# Patient Record
Sex: FEMALE | Race: WHITE | Employment: PART TIME | URBAN - NONMETROPOLITAN AREA
[De-identification: names, ages, dates, MRNs, and addresses within clinical notes are randomized per-mention and may not be internally consistent; named-entity substitution may affect disease eponyms.]

---

## 2017-01-17 ENCOUNTER — HOSPITAL ENCOUNTER (OUTPATIENT)
Dept: LAB | Age: 32
Discharge: HOME | End: 2017-01-17
Attending: SPECIALIST | Admitting: SPECIALIST

## 2017-01-17 LAB — CALCIUM SERPL-MCNC: 9.6 MG/DL (ref 8.3–10.6)

## 2017-01-18 LAB — PARATHYROID HORMONE INTACT: 39

## 2017-05-11 ENCOUNTER — HOSPITAL ENCOUNTER (OUTPATIENT)
Dept: GENERAL RADIOLOGY | Age: 32
Discharge: HOME | End: 2017-05-11
Attending: UROLOGY | Admitting: UROLOGY

## 2017-05-11 DIAGNOSIS — N20.0 URIC ACID NEPHROLITHIASIS: ICD-10-CM

## 2017-05-12 ENCOUNTER — HOSPITAL ENCOUNTER (OUTPATIENT)
Dept: OTHER | Age: 32
Discharge: HOME | End: 2017-05-12
Attending: SPECIALIST | Admitting: SPECIALIST

## 2017-05-17 LAB
STONE COMPOSITION: NORMAL
STONE DESCRIPTION: NORMAL
STONE MASS: 15
STONE NUMBER: 1
STONE SIZE: NORMAL

## 2017-11-01 ENCOUNTER — HOSPITAL ENCOUNTER (OUTPATIENT)
Dept: ULTRASOUND IMAGING | Age: 32
Discharge: HOME | End: 2017-11-01
Attending: SPECIALIST | Admitting: SPECIALIST

## 2017-11-01 DIAGNOSIS — N20.0 CALCULUS OF KIDNEY: ICD-10-CM

## 2018-04-29 PROBLEM — Z32.01 PREGNANCY EXAMINATION OR TEST, POSITIVE RESULT: Status: ACTIVE | Noted: 2018-04-29

## 2018-07-17 PROBLEM — O26.90 PREGNANCY RELATED CONDITION: Status: ACTIVE | Noted: 2018-07-17

## 2018-07-26 PROBLEM — Z34.93 ENCOUNTER FOR PREGNANCY RELATED EXAMINATION, THIRD TRIMESTER: Status: ACTIVE | Noted: 2018-07-26

## 2019-11-07 ENCOUNTER — HOSPITAL ENCOUNTER (EMERGENCY)
Age: 34
Discharge: HOME | End: 2019-11-07
Attending: EMERGENCY MEDICINE
Payer: COMMERCIAL

## 2019-11-07 VITALS
WEIGHT: 194 LBS | BODY MASS INDEX: 28.73 KG/M2 | HEIGHT: 69 IN | OXYGEN SATURATION: 99 % | DIASTOLIC BLOOD PRESSURE: 78 MMHG | RESPIRATION RATE: 16 BRPM | TEMPERATURE: 98.2 F | SYSTOLIC BLOOD PRESSURE: 121 MMHG | HEART RATE: 75 BPM

## 2019-11-07 DIAGNOSIS — R10.13 ABDOMINAL PAIN, EPIGASTRIC: Primary | ICD-10-CM

## 2019-11-07 LAB
ALBUMIN SERPL-MCNC: 4.4 GM/DL (ref 3.4–5)
ALP BLD-CCNC: 85 IU/L (ref 40–129)
ALT SERPL-CCNC: 10 U/L (ref 10–40)
ANION GAP SERPL CALCULATED.3IONS-SCNC: 8 MMOL/L (ref 4–16)
AST SERPL-CCNC: 8 IU/L (ref 15–37)
BASOPHILS ABSOLUTE: 0 K/CU MM
BASOPHILS RELATIVE PERCENT: 0.6 % (ref 0–1)
BILIRUB SERPL-MCNC: 0.2 MG/DL (ref 0–1)
BUN BLDV-MCNC: 11 MG/DL (ref 6–23)
CALCIUM SERPL-MCNC: 9.7 MG/DL (ref 8.3–10.6)
CHLORIDE BLD-SCNC: 98 MMOL/L (ref 99–110)
CO2: 31 MMOL/L (ref 21–32)
CREAT SERPL-MCNC: 0.8 MG/DL (ref 0.6–1.1)
DIFFERENTIAL TYPE: ABNORMAL
EOSINOPHILS ABSOLUTE: 0.1 K/CU MM
EOSINOPHILS RELATIVE PERCENT: 1.8 % (ref 0–3)
GFR AFRICAN AMERICAN: >60 ML/MIN/1.73M2
GFR NON-AFRICAN AMERICAN: >60 ML/MIN/1.73M2
GLUCOSE BLD-MCNC: 94 MG/DL (ref 70–99)
HCG QUALITATIVE: NEGATIVE
HCT VFR BLD CALC: 45.4 % (ref 37–47)
HEMOGLOBIN: 14.5 GM/DL (ref 12.5–16)
IMMATURE NEUTROPHIL %: 0.2 % (ref 0–0.43)
LIPASE: 27 IU/L (ref 13–60)
LYMPHOCYTES ABSOLUTE: 2 K/CU MM
LYMPHOCYTES RELATIVE PERCENT: 36.7 % (ref 24–44)
MCH RBC QN AUTO: 26.6 PG (ref 27–31)
MCHC RBC AUTO-ENTMCNC: 31.9 % (ref 32–36)
MCV RBC AUTO: 83.2 FL (ref 78–100)
MONOCYTES ABSOLUTE: 0.5 K/CU MM
MONOCYTES RELATIVE PERCENT: 8.4 % (ref 0–4)
PDW BLD-RTO: 14.4 % (ref 11.7–14.9)
PLATELET # BLD: 224 K/CU MM (ref 140–440)
PMV BLD AUTO: 9.9 FL (ref 7.5–11.1)
POTASSIUM SERPL-SCNC: 4.1 MMOL/L (ref 3.5–5.1)
RBC # BLD: 5.46 M/CU MM (ref 4.2–5.4)
SEGMENTED NEUTROPHILS ABSOLUTE COUNT: 2.9 K/CU MM
SEGMENTED NEUTROPHILS RELATIVE PERCENT: 52.3 % (ref 36–66)
SODIUM BLD-SCNC: 137 MMOL/L (ref 135–145)
TOTAL IMMATURE NEUTOROPHIL: 0.01 K/CU MM
TOTAL PROTEIN: 7.9 GM/DL (ref 6.4–8.2)
WBC # BLD: 5.5 K/CU MM (ref 4–10.5)

## 2019-11-07 PROCEDURE — 6370000000 HC RX 637 (ALT 250 FOR IP): Performed by: EMERGENCY MEDICINE

## 2019-11-07 PROCEDURE — 80053 COMPREHEN METABOLIC PANEL: CPT

## 2019-11-07 PROCEDURE — 84703 CHORIONIC GONADOTROPIN ASSAY: CPT

## 2019-11-07 PROCEDURE — 85025 COMPLETE CBC W/AUTO DIFF WBC: CPT

## 2019-11-07 PROCEDURE — 83690 ASSAY OF LIPASE: CPT

## 2019-11-07 PROCEDURE — 99284 EMERGENCY DEPT VISIT MOD MDM: CPT

## 2019-11-07 RX ORDER — LIDOCAINE HYDROCHLORIDE 20 MG/ML
15 SOLUTION OROPHARYNGEAL ONCE
Status: COMPLETED | OUTPATIENT
Start: 2019-11-07 | End: 2019-11-07

## 2019-11-07 RX ORDER — MAGNESIUM HYDROXIDE/ALUMINUM HYDROXICE/SIMETHICONE 120; 1200; 1200 MG/30ML; MG/30ML; MG/30ML
30 SUSPENSION ORAL ONCE
Status: COMPLETED | OUTPATIENT
Start: 2019-11-07 | End: 2019-11-07

## 2019-11-07 RX ORDER — SUCRALFATE ORAL 1 G/10ML
1 SUSPENSION ORAL 4 TIMES DAILY
Qty: 1200 ML | Refills: 0 | Status: SHIPPED | OUTPATIENT
Start: 2019-11-07 | End: 2021-09-30 | Stop reason: ALTCHOICE

## 2019-11-07 RX ADMIN — LIDOCAINE HYDROCHLORIDE 15 ML: 20 SOLUTION ORAL; TOPICAL at 13:39

## 2019-11-07 RX ADMIN — ALUMINUM HYDROXIDE, MAGNESIUM HYDROXIDE, AND SIMETHICONE 30 ML: 200; 200; 20 SUSPENSION ORAL at 13:39

## 2019-11-07 ASSESSMENT — PAIN SCALES - GENERAL: PAINLEVEL_OUTOF10: 5

## 2019-11-07 ASSESSMENT — ENCOUNTER SYMPTOMS
CONSTIPATION: 0
EYE REDNESS: 0
BLOOD IN STOOL: 0
RHINORRHEA: 0
COUGH: 0
SORE THROAT: 0
ABDOMINAL PAIN: 1
BACK PAIN: 0
VOMITING: 0
SHORTNESS OF BREATH: 0
NAUSEA: 1
DIARRHEA: 1

## 2019-11-07 ASSESSMENT — PAIN DESCRIPTION - PAIN TYPE: TYPE: ACUTE PAIN

## 2019-11-07 ASSESSMENT — PAIN DESCRIPTION - ORIENTATION: ORIENTATION: UPPER

## 2019-11-07 ASSESSMENT — PAIN DESCRIPTION - DESCRIPTORS: DESCRIPTORS: ACHING

## 2019-11-07 ASSESSMENT — PAIN DESCRIPTION - LOCATION: LOCATION: ABDOMEN

## 2019-11-07 ASSESSMENT — PAIN DESCRIPTION - FREQUENCY: FREQUENCY: CONTINUOUS

## 2021-08-17 ENCOUNTER — TELEPHONE (OUTPATIENT)
Dept: OBGYN | Age: 36
End: 2021-08-17

## 2021-08-18 DIAGNOSIS — O20.0 THREATENED ABORTION: Primary | ICD-10-CM

## 2021-08-18 LAB — GONADOTROPIN, CHORIONIC (HCG) QUANT: 22.4 MIU/ML

## 2021-08-18 PROCEDURE — 36415 COLL VENOUS BLD VENIPUNCTURE: CPT | Performed by: OBSTETRICS & GYNECOLOGY

## 2021-08-26 ENCOUNTER — OFFICE VISIT (OUTPATIENT)
Dept: OBGYN | Age: 36
End: 2021-08-26

## 2021-08-26 VITALS
SYSTOLIC BLOOD PRESSURE: 144 MMHG | DIASTOLIC BLOOD PRESSURE: 89 MMHG | WEIGHT: 192 LBS | BODY MASS INDEX: 28.44 KG/M2 | HEIGHT: 69 IN

## 2021-08-26 DIAGNOSIS — O02.81 CHEMICAL PREGNANCY: Primary | ICD-10-CM

## 2021-08-26 LAB — GONADOTROPIN, CHORIONIC (HCG) QUANT: <5 MIU/ML

## 2021-08-26 PROCEDURE — 36415 COLL VENOUS BLD VENIPUNCTURE: CPT | Performed by: OBSTETRICS & GYNECOLOGY

## 2021-08-26 PROCEDURE — 99213 OFFICE O/P EST LOW 20 MIN: CPT | Performed by: OBSTETRICS & GYNECOLOGY

## 2021-08-26 SDOH — ECONOMIC STABILITY: FOOD INSECURITY: WITHIN THE PAST 12 MONTHS, THE FOOD YOU BOUGHT JUST DIDN'T LAST AND YOU DIDN'T HAVE MONEY TO GET MORE.: NEVER TRUE

## 2021-08-26 SDOH — ECONOMIC STABILITY: FOOD INSECURITY: WITHIN THE PAST 12 MONTHS, YOU WORRIED THAT YOUR FOOD WOULD RUN OUT BEFORE YOU GOT MONEY TO BUY MORE.: NEVER TRUE

## 2021-08-26 ASSESSMENT — PATIENT HEALTH QUESTIONNAIRE - PHQ9
SUM OF ALL RESPONSES TO PHQ QUESTIONS 1-9: 0
SUM OF ALL RESPONSES TO PHQ9 QUESTIONS 1 & 2: 0
SUM OF ALL RESPONSES TO PHQ QUESTIONS 1-9: 0
1. LITTLE INTEREST OR PLEASURE IN DOING THINGS: 0
SUM OF ALL RESPONSES TO PHQ QUESTIONS 1-9: 0
2. FEELING DOWN, DEPRESSED OR HOPELESS: 0

## 2021-08-26 ASSESSMENT — ENCOUNTER SYMPTOMS
GASTROINTESTINAL NEGATIVE: 1
RESPIRATORY NEGATIVE: 1
ALLERGIC/IMMUNOLOGIC NEGATIVE: 1
EYES NEGATIVE: 1

## 2021-08-26 ASSESSMENT — SOCIAL DETERMINANTS OF HEALTH (SDOH): HOW HARD IS IT FOR YOU TO PAY FOR THE VERY BASICS LIKE FOOD, HOUSING, MEDICAL CARE, AND HEATING?: NOT HARD AT ALL

## 2021-08-26 NOTE — PROGRESS NOTES
8/26/21    Reid Gonzalez  1985    Chief Complaint   Patient presents with    Follow-up     pt here to discuss labs and ultrasound, Pt states she is not bleeding anymore. Reid Gonzalez is a 38 y.o. female who presents today for evaluation of + hcg and bleeding. Bleeding has stopped. No pain    Past Medical History:   Diagnosis Date    Acute appendicitis 4/11/2012    Kidney stones        Past Surgical History:   Procedure Laterality Date    APPENDECTOMY      CHOLECYSTECTOMY      LAPAROSCOPIC APPENDECTOMY  04/10/12    OTHER SURGICAL HISTORY  7/2010    lump removed from right axilla    OTHER SURGICAL HISTORY  9/2010    lump removed from groin area    OTHER SURGICAL HISTORY Right 4/11/2013    Excision of lesion to R axilla    OTHER SURGICAL HISTORY Right 4/29/2013    Closure right axillary wound       Social History     Tobacco Use    Smoking status: Current Every Day Smoker     Packs/day: 1.00     Years: 15.00     Pack years: 15.00     Types: Cigarettes     Last attempt to quit: 12/1/2017     Years since quitting: 3.7    Smokeless tobacco: Never Used   Vaping Use    Vaping Use: Never used   Substance Use Topics    Alcohol use: No    Drug use: No       Family History   Problem Relation Age of Onset    High Blood Pressure Father     High Cholesterol Father     Cancer Sister         Lymphoma    High Cholesterol Mother     Diabetes Paternal Aunt     Cancer Maternal Grandfather     Cancer Paternal Grandmother     Stroke Paternal Grandmother     Arthritis Paternal Grandfather        Current Outpatient Medications   Medication Sig Dispense Refill    sucralfate (CARAFATE) 1 GM/10ML suspension Take 10 mLs by mouth 4 times daily (Patient not taking: Reported on 8/26/2021) 1200 mL 0     No current facility-administered medications for this visit.        No Known Allergies    [id number]    Immunization History   Administered Date(s) Administered    Tdap (Boostrix, Adacel) 07/29/2018       Review of Systems   Constitutional: Negative. Eyes: Negative. Respiratory: Negative. Cardiovascular: Negative. Gastrointestinal: Negative. Endocrine: Negative. Genitourinary: Negative. Musculoskeletal: Negative. Skin: Negative. Allergic/Immunologic: Negative. Neurological: Negative. Hematological: Negative. Psychiatric/Behavioral: Negative. BP (!) 144/89   Ht 5' 9\" (1.753 m)   Wt 192 lb (87.1 kg)   BMI 28.35 kg/m²     Physical Exam  Constitutional:       General: She is not in acute distress. Appearance: Normal appearance. She is not ill-appearing. HENT:      Head: Normocephalic. Nose: Nose normal.   Eyes:      General: No scleral icterus. Right eye: No discharge. Left eye: No discharge. Cardiovascular:      Pulses: Normal pulses. Pulmonary:      Effort: Pulmonary effort is normal.   Abdominal:      General: Abdomen is flat. There is no distension. Palpations: Abdomen is soft. There is no mass. Tenderness: There is no abdominal tenderness. Hernia: No hernia is present. Musculoskeletal:         General: Normal range of motion. Cervical back: Normal range of motion and neck supple. No rigidity. Skin:     General: Skin is warm and dry. Neurological:      General: No focal deficit present. Mental Status: She is alert and oriented to person, place, and time. Psychiatric:         Mood and Affect: Mood normal.         Behavior: Behavior normal.         No results found for this visit on 08/26/21. ASSESSMENT AND PLAN   Diagnosis Orders   1. Chemical pregnancy  HCG, Quantitative, Pregnancy   reviewed us and reviewed [facility], suspect chemical pregnancy, repeat hcg, bleeding/pain precautions    No follow-ups on file.     Errol Shafer MD

## 2021-09-30 ENCOUNTER — OFFICE VISIT (OUTPATIENT)
Dept: SURGERY | Age: 36
End: 2021-09-30

## 2021-09-30 ENCOUNTER — HOSPITAL ENCOUNTER (OUTPATIENT)
Age: 36
Setting detail: SPECIMEN
Discharge: HOME | End: 2021-09-30

## 2021-09-30 ENCOUNTER — TELEPHONE (OUTPATIENT)
Dept: SURGERY | Age: 36
End: 2021-09-30

## 2021-09-30 VITALS
TEMPERATURE: 98 F | BODY MASS INDEX: 29.06 KG/M2 | SYSTOLIC BLOOD PRESSURE: 141 MMHG | DIASTOLIC BLOOD PRESSURE: 84 MMHG | RESPIRATION RATE: 16 BRPM | HEIGHT: 69 IN | WEIGHT: 196.2 LBS | HEART RATE: 101 BPM

## 2021-09-30 DIAGNOSIS — L02.91 ABSCESS: Primary | ICD-10-CM

## 2021-09-30 DIAGNOSIS — L02.411 ABSCESS OF AXILLA, RIGHT: Primary | ICD-10-CM

## 2021-09-30 PROCEDURE — 87186 SC STD MICRODIL/AGAR DIL: CPT

## 2021-09-30 PROCEDURE — 87077 CULTURE AEROBIC IDENTIFY: CPT

## 2021-09-30 PROCEDURE — 10060 I&D ABSCESS SIMPLE/SINGLE: CPT | Performed by: SURGERY

## 2021-09-30 PROCEDURE — 87070 CULTURE OTHR SPECIMN AEROBIC: CPT

## 2021-09-30 PROCEDURE — 87076 CULTURE ANAEROBE IDENT EACH: CPT

## 2021-09-30 PROCEDURE — 87075 CULTR BACTERIA EXCEPT BLOOD: CPT

## 2021-09-30 PROCEDURE — 87185 SC STD ENZYME DETCJ PER NZM: CPT

## 2021-09-30 RX ORDER — SULFAMETHOXAZOLE AND TRIMETHOPRIM 800; 160 MG/1; MG/1
1 TABLET ORAL 2 TIMES DAILY
Qty: 20 TABLET | Refills: 0 | Status: SHIPPED | OUTPATIENT
Start: 2021-09-30 | End: 2021-10-10

## 2021-09-30 NOTE — PROGRESS NOTES
Subjective:       William Clark is a 43 y.o. female who presents for evaluation of a cutaneous abscess. Lesion is located in the right axilla. Onset was 1 week ago. Symptoms have gradually worsened. Abscess has associated symptoms of pain. Patient does have previous history of cutaneous abscesses. Patient does not have diabetes.     PMH: NONE  Past Medical History:   Diagnosis Date    Acute appendicitis 4/11/2012    Kidney stones      Patient Active Problem List    Diagnosis Date Noted    Chemical pregnancy 08/26/2021    Encounter for pregnancy related examination, third trimester 07/26/2018    Pregnancy related condition 07/17/2018    Pregnancy examination or test, positive result 04/29/2018    Dehiscence of surgical wound 04/29/2013    Acute appendicitis 04/11/2012     Past Surgical History:   Procedure Laterality Date    APPENDECTOMY      CHOLECYSTECTOMY      LAPAROSCOPIC APPENDECTOMY  04/10/12    OTHER SURGICAL HISTORY  7/2010    lump removed from right axilla    OTHER SURGICAL HISTORY  9/2010    lump removed from groin area    OTHER SURGICAL HISTORY Right 4/11/2013    Excision of lesion to R axilla    OTHER SURGICAL HISTORY Right 4/29/2013    Closure right axillary wound     Family History   Problem Relation Age of Onset    High Blood Pressure Father     High Cholesterol Father     Cancer Sister         Lymphoma    High Cholesterol Mother     Diabetes Paternal Aunt     Cancer Maternal Grandfather     Cancer Paternal Grandmother     Stroke Paternal Grandmother     Arthritis Paternal Grandfather      Social History     Socioeconomic History    Marital status:      Spouse name: Joana Hernandez Number of children: 1    Years of education: None    Highest education level: None   Occupational History    Occupation: unemployed   Tobacco Use    Smoking status: Current Every Day Smoker     Packs/day: 1.00     Years: 15.00     Pack years: 15.00     Types: Cigarettes     Last attempt to quit: 12/1/2017     Years since quitting: 3.8    Smokeless tobacco: Never Used   Vaping Use    Vaping Use: Never used   Substance and Sexual Activity    Alcohol use: No    Drug use: No    Sexual activity: Yes     Partners: Male   Other Topics Concern    None   Social History Narrative    Do you donate blood or plasma? No    Caffeine intake? 3 can of pop    Advance directive? No    Is blood transfusion acceptable in an emergency? Yes             Social Determinants of Health     Financial Resource Strain: Low Risk     Difficulty of Paying Living Expenses: Not hard at all   Food Insecurity: No Food Insecurity    Worried About Running Out of Food in the Last Year: Never true    Eleuterio of Food in the Last Year: Never true   Transportation Needs:     Lack of Transportation (Medical):  Lack of Transportation (Non-Medical):    Physical Activity:     Days of Exercise per Week:     Minutes of Exercise per Session:    Stress:     Feeling of Stress :    Social Connections:     Frequency of Communication with Friends and Family:     Frequency of Social Gatherings with Friends and Family:     Attends Buddhism Services:     Active Member of Clubs or Organizations:     Attends Club or Organization Meetings:     Marital Status:    Intimate Partner Violence:     Fear of Current or Ex-Partner:     Emotionally Abused:     Physically Abused:     Sexually Abused:      No current outpatient medications on file. No current facility-administered medications for this visit. No current outpatient medications on file prior to visit. No current facility-administered medications on file prior to visit. No Known Allergies      Objective: There is an area characterized by erythema surrounding area measuring 4 cm measuring 3 cm in greatest dimension. Location: right axilla. Procedure  Informed consent obtained. The area was prepped in the usual manner.   The area was sharply incised and approx 15 ccs of purulent material was obtained. Packing was inserted after irrigating the area. .      Assessment:       Cutaneous abscess. Plan:      I & D procedure as above. Bactrim DS BID x 10 days. F/U on Tuesday. Remove 1-2 inches of packing daily.

## 2021-10-03 LAB
CULTURE: ABNORMAL
Lab: ABNORMAL
SPECIMEN: ABNORMAL

## 2021-10-04 NOTE — PROGRESS NOTES
Zoey Norton is 5 days post-op: drainage of abscess under the right arm. It grew staph sensitive to bactrim. Presenting for routine follow-up. S:  Doing well. Patient has noted no excessive redness and swelling. O:  Wound healing well. Drainage as expected. Minimal tenderness and no erythema. Packing came out Sunday. A:  Satisfactory course. P: Patient to return prn. Patient is to return as needed for redness, swelling, discomfort, or any concern about her  surgery.

## 2021-10-05 ENCOUNTER — OFFICE VISIT (OUTPATIENT)
Dept: SURGERY | Age: 36
End: 2021-10-05

## 2021-10-05 DIAGNOSIS — L02.411 ABSCESS OF AXILLA, RIGHT: Primary | ICD-10-CM

## 2021-10-05 DIAGNOSIS — Z09 POSTOP CHECK: ICD-10-CM

## 2021-10-05 PROCEDURE — 99024 POSTOP FOLLOW-UP VISIT: CPT | Performed by: SURGERY

## 2021-11-11 ENCOUNTER — OFFICE VISIT (OUTPATIENT)
Dept: OBGYN | Age: 36
End: 2021-11-11
Payer: MEDICARE

## 2021-11-11 VITALS
HEIGHT: 69 IN | WEIGHT: 207 LBS | HEART RATE: 55 BPM | BODY MASS INDEX: 30.66 KG/M2 | DIASTOLIC BLOOD PRESSURE: 71 MMHG | SYSTOLIC BLOOD PRESSURE: 130 MMHG

## 2021-11-11 DIAGNOSIS — N91.2 AMENORRHEA: Primary | ICD-10-CM

## 2021-11-11 DIAGNOSIS — F17.211 CIGARETTE NICOTINE DEPENDENCE IN REMISSION: ICD-10-CM

## 2021-11-11 LAB
CONTROL: NORMAL
PREGNANCY TEST URINE, POC: POSITIVE

## 2021-11-11 PROCEDURE — 99213 OFFICE O/P EST LOW 20 MIN: CPT | Performed by: OBSTETRICS & GYNECOLOGY

## 2021-11-11 PROCEDURE — 81025 URINE PREGNANCY TEST: CPT | Performed by: OBSTETRICS & GYNECOLOGY

## 2021-11-11 RX ORDER — CALCIUM CARBONATE 300MG(750)
1 TABLET,CHEWABLE ORAL DAILY
Qty: 30 TABLET | Refills: 11 | Status: SHIPPED | OUTPATIENT
Start: 2021-11-11 | End: 2022-08-02 | Stop reason: ALTCHOICE

## 2021-11-11 ASSESSMENT — ENCOUNTER SYMPTOMS
ALLERGIC/IMMUNOLOGIC NEGATIVE: 1
RESPIRATORY NEGATIVE: 1
NAUSEA: 1
EYES NEGATIVE: 1

## 2021-11-11 NOTE — PROGRESS NOTES
11/11/21    Gadiel Amaya  1985    Chief Complaint   Patient presents with    Amenorrhea     LMP 9/14/21, c/o h.a., nausea, iron taste in mouth. positive home pregnancy test.        Gadiel Amaya is a 43 y.o. female who presents today for evaluation of missed menses. Metallic taste in mouth and abnormal smells. + tender breast.  + nausea.     Past Medical History:   Diagnosis Date    Acute appendicitis 4/11/2012    Chemical pregnancy     Irregular menses     Kidney stones     Obesity     Pregnant     Prior pregnancy with fetal demise and current pregnancy 02/2007    36Wks       Past Surgical History:   Procedure Laterality Date    APPENDECTOMY      CHOLECYSTECTOMY      LAPAROSCOPIC APPENDECTOMY  04/10/12    OTHER SURGICAL HISTORY  7/2010    lump removed from right axilla    OTHER SURGICAL HISTORY  9/2010    lump removed from groin area    OTHER SURGICAL HISTORY Right 4/11/2013    Excision of lesion to R axilla    OTHER SURGICAL HISTORY Right 4/29/2013    Closure right axillary wound       Social History     Tobacco Use    Smoking status: Current Every Day Smoker     Packs/day: 1.00     Years: 15.00     Pack years: 15.00     Types: Cigarettes     Last attempt to quit: 12/1/2017     Years since quitting: 3.9    Smokeless tobacco: Never Used   Vaping Use    Vaping Use: Never used   Substance Use Topics    Alcohol use: No    Drug use: No       Family History   Problem Relation Age of Onset    High Blood Pressure Father     High Cholesterol Father     Cancer Sister         Lymphoma    High Cholesterol Mother     Diabetes Paternal Aunt     Cancer Maternal Grandfather     Cancer Paternal Grandmother     Stroke Paternal Grandmother     Arthritis Paternal Grandfather        Current Outpatient Medications   Medication Sig Dispense Refill    Prenatal MV-Min-FA-Omega-3 (PRENATAL GUMMIES/DHA & FA) 0.4-32.5 MG CHEW Take 1 tablet by mouth daily 30 tablet 11     No current facility-administered medications for this visit. No Known Allergies    [id number]    Immunization History   Administered Date(s) Administered    Tdap (Boostrix, Adacel) 07/29/2018       Review of Systems   Constitutional: Negative. Eyes: Negative. Respiratory: Negative. Cardiovascular: Negative. Gastrointestinal: Positive for nausea. Endocrine: Negative. Genitourinary: Positive for menstrual problem. Musculoskeletal: Negative. Skin: Negative. Allergic/Immunologic: Negative. Neurological: Negative. Hematological: Negative. Psychiatric/Behavioral: Negative. /71   Pulse 55   Ht 5' 9\" (1.753 m)   Wt 207 lb (93.9 kg)   LMP 09/14/2021   BMI 30.57 kg/m²     Physical Exam  Constitutional:       General: She is not in acute distress. Appearance: Normal appearance. She is not ill-appearing. HENT:      Head: Normocephalic. Nose: Nose normal.   Eyes:      General: No scleral icterus. Right eye: No discharge. Left eye: No discharge. Cardiovascular:      Pulses: Normal pulses. Pulmonary:      Effort: Pulmonary effort is normal.   Abdominal:      General: Abdomen is flat. There is no distension. Palpations: Abdomen is soft. There is no mass. Tenderness: There is no abdominal tenderness. Hernia: No hernia is present. Musculoskeletal:         General: Normal range of motion. Cervical back: Normal range of motion and neck supple. No rigidity. Skin:     General: Skin is warm and dry. Neurological:      General: No focal deficit present. Mental Status: She is alert and oriented to person, place, and time. Psychiatric:         Mood and Affect: Mood normal.         Behavior: Behavior normal.         Results for orders placed or performed in visit on 11/11/21   POCT urine pregnancy   Result Value Ref Range    Preg Test, Ur positive     Control         ASSESSMENT AND PLAN   Diagnosis Orders   1.  Amenorrhea  POCT urine pregnancy    US NON OB TRANSVAGINAL    Prenatal MV-Min-FA-Omega-3 (PRENATAL GUMMIES/DHA & FA) 0.4-32.5 MG CHEW   2. Cigarette nicotine dependence in remission       Healthy lifestyle discussed  Age related risk of aneuploidy discussed  Return in about 2 weeks (around 11/25/2021).     Cliff Woodard MD

## 2021-12-09 ENCOUNTER — INITIAL PRENATAL (OUTPATIENT)
Dept: OBGYN | Age: 36
End: 2021-12-09
Payer: MEDICARE

## 2021-12-09 VITALS
HEIGHT: 69 IN | SYSTOLIC BLOOD PRESSURE: 140 MMHG | HEART RATE: 104 BPM | WEIGHT: 210 LBS | DIASTOLIC BLOOD PRESSURE: 87 MMHG | BODY MASS INDEX: 31.1 KG/M2

## 2021-12-09 DIAGNOSIS — O09.521 AMA (ADVANCED MATERNAL AGE) MULTIGRAVIDA 35+, FIRST TRIMESTER: ICD-10-CM

## 2021-12-09 DIAGNOSIS — E66.9 OBESITY, UNSPECIFIED CLASSIFICATION, UNSPECIFIED OBESITY TYPE, UNSPECIFIED WHETHER SERIOUS COMORBIDITY PRESENT: ICD-10-CM

## 2021-12-09 DIAGNOSIS — O09.291 HISTORY OF INTRAUTERINE FETAL DEATH, CURRENTLY PREGNANT IN FIRST TRIMESTER: ICD-10-CM

## 2021-12-09 DIAGNOSIS — O09.91 HIGH-RISK PREGNANCY, FIRST TRIMESTER: Primary | ICD-10-CM

## 2021-12-09 DIAGNOSIS — Z3A.12 12 WEEKS GESTATION OF PREGNANCY: ICD-10-CM

## 2021-12-09 LAB
ABO/RH: NORMAL
ANTIBODY SCREEN: NORMAL
GLUCOSE BLD-MCNC: 72 MG/DL (ref 70–99)

## 2021-12-09 PROCEDURE — 36415 COLL VENOUS BLD VENIPUNCTURE: CPT | Performed by: NURSE PRACTITIONER

## 2021-12-09 PROCEDURE — 4004F PT TOBACCO SCREEN RCVD TLK: CPT | Performed by: NURSE PRACTITIONER

## 2021-12-09 PROCEDURE — H1000 PRENATAL CARE ATRISK ASSESSM: HCPCS | Performed by: NURSE PRACTITIONER

## 2021-12-09 PROCEDURE — G8427 DOCREV CUR MEDS BY ELIG CLIN: HCPCS | Performed by: NURSE PRACTITIONER

## 2021-12-09 PROCEDURE — G8419 CALC BMI OUT NRM PARAM NOF/U: HCPCS | Performed by: NURSE PRACTITIONER

## 2021-12-09 PROCEDURE — 99213 OFFICE O/P EST LOW 20 MIN: CPT | Performed by: NURSE PRACTITIONER

## 2021-12-09 PROCEDURE — G8484 FLU IMMUNIZE NO ADMIN: HCPCS | Performed by: NURSE PRACTITIONER

## 2021-12-09 PROCEDURE — H1002 CARECOORDINATION PRENATAL: HCPCS | Performed by: NURSE PRACTITIONER

## 2021-12-09 ASSESSMENT — ENCOUNTER SYMPTOMS
RESPIRATORY NEGATIVE: 1
GASTROINTESTINAL NEGATIVE: 1

## 2021-12-09 NOTE — PROGRESS NOTES
21    Mario Milton  1985    Chief Complaint   Patient presents with    Initial Prenatal Visit     pt c/o metallic and bloody taste in her mouth x1 month, no other c/o. Mario Milton is a 43 y.o. female, [id number] ,  LMP was Patient's last menstrual period was 2021., who presents for presents for prenatal care. A urine test was completed. Since her LMP she claims she has been without significant complaints. Past History:  She has a history of IUFD @ 36wks. Past Medical History:   Diagnosis Date    Acute appendicitis 2012    Chemical pregnancy     Irregular menses     Kidney stones     Obesity     Pregnant     Prior pregnancy with fetal demise and current pregnancy 2007    36Wks       Past Surgical History:   Procedure Laterality Date    APPENDECTOMY      CHOLECYSTECTOMY      LAPAROSCOPIC APPENDECTOMY  04/10/12    OTHER SURGICAL HISTORY  2010    lump removed from right axilla    OTHER SURGICAL HISTORY  2010    lump removed from groin area    OTHER SURGICAL HISTORY Right 2013    Excision of lesion to R axilla    OTHER SURGICAL HISTORY Right 2013    Closure right axillary wound       Social History     Socioeconomic History    Marital status:      Spouse name: Aimee Carrillo Number of children: 1    Years of education: Not on file    Highest education level: Not on file   Occupational History    Occupation: unemployed   Tobacco Use    Smoking status: Current Every Day Smoker     Packs/day: 1.00     Years: 15.00     Pack years: 15.00     Types: Cigarettes     Last attempt to quit: 2017     Years since quittin.0    Smokeless tobacco: Never Used   Vaping Use    Vaping Use: Never used   Substance and Sexual Activity    Alcohol use: No    Drug use: No    Sexual activity: Yes     Partners: Male   Other Topics Concern    Not on file   Social History Narrative    Do you donate blood or plasma?  No    Caffeine intake? 3 can of pop    Advance directive? No    Is blood transfusion acceptable in an emergency? Yes             Social Determinants of Health     Financial Resource Strain: Low Risk     Difficulty of Paying Living Expenses: Not hard at all   Food Insecurity: No Food Insecurity    Worried About Running Out of Food in the Last Year: Never true    Eleuterio of Food in the Last Year: Never true   Transportation Needs:     Lack of Transportation (Medical): Not on file    Lack of Transportation (Non-Medical):  Not on file   Physical Activity:     Days of Exercise per Week: Not on file    Minutes of Exercise per Session: Not on file   Stress:     Feeling of Stress : Not on file   Social Connections:     Frequency of Communication with Friends and Family: Not on file    Frequency of Social Gatherings with Friends and Family: Not on file    Attends Mormonism Services: Not on file    Active Member of [address] [place] [address] [employer] or Organizations: Not on file    Attends Club or Organization Meetings: Not on file    Marital Status: Not on file   Intimate Partner Violence:     Fear of Current or Ex-Partner: Not on file    Emotionally Abused: Not on file    Physically Abused: Not on file    Sexually Abused: Not on file   Housing Stability:     Unable to Pay for Housing in the Last Year: Not on file    Number of [place] in the Last Year: Not on file    Unstable Housing in the Last Year: Not on file       Family History   Problem Relation Age of Onset    High Blood Pressure Father     High Cholesterol Father     Cancer Sister         Lymphoma    High Cholesterol Mother     Diabetes Paternal Aunt     Cancer Maternal Grandfather     Cancer Paternal Grandmother     Stroke Paternal Grandmother     Arthritis Paternal Grandfather        Current Outpatient Medications   Medication Sig Dispense Refill    Prenatal MV-Min-FA-Omega-3 (PRENATAL GUMMIES/DHA & FA) 0.4-32.5 MG CHEW Take 1 tablet by mouth daily 30 tablet 11     No current facility-administered medications for this visit. No Known Allergies    [id number]    Immunization History   Administered Date(s) Administered    Tdap (Boostrix, Adacel) 07/29/2018       Review of Systems   Constitutional: Negative. Respiratory: Negative. Gastrointestinal: Negative. Genitourinary: Negative. BP (!) 140/87   Pulse 104   Ht 5' 9\" (1.753 m)   Wt 210 lb (95.3 kg)   LMP 09/14/2021   BMI 31.01 kg/m²     Physical Exam  Vitals and nursing note reviewed. Constitutional:       Appearance: She is obese. HENT:      Head: Normocephalic. Pulmonary:      Effort: Pulmonary effort is normal.   Musculoskeletal:         General: Normal range of motion. Cervical back: Normal range of motion. Skin:     General: Skin is warm and dry. Neurological:      Mental Status: She is alert. Psychiatric:         Mood and Affect: Mood normal.         No results found for this visit on 12/09/21. ASSESSMENT AND PLAN   Diagnosis Orders   1. High-risk pregnancy, first trimester  Obstetric panel    HIV Screen    Hepatitis C RNA QNT W Genotype RFLX    Culture, Urine    Glucose    Hemoglobin A1C    US OB 14 PLUS WEEKS SINGLE OR FIRST GESTATION    C.trachomatis N.gonorrhoeae DNA, Urine   2. Obesity, unspecified classification, unspecified obesity type, unspecified whether serious comorbidity present  US OB 14 PLUS WEEKS SINGLE OR FIRST GESTATION   3. 12 weeks gestation of pregnancy  Obstetric panel    HIV Screen    Hepatitis C RNA QNT W Genotype RFLX    Culture, Urine    Glucose    Hemoglobin A1C    C.trachomatis N.gonorrhoeae DNA, Urine   4. AMA (advanced maternal age) multigravida 33+, first trimester     5. History of intrauterine fetal death, currently pregnant in first trimester       Prenatal info given, hx and poc reviewed. Denies hx of abnormal pap smear, declines to collect today. Cultures and labs collected.  Materni 21 and Carrier Screens collected    Flu vaccine declined    Tobacco; quit 11/2021          Return in about 4 weeks (around 1/6/2022).     Kemi Fox, APRN - CNP

## 2021-12-10 LAB
BASOPHILS ABSOLUTE: 0 K/UL (ref 0–0.2)
BASOPHILS RELATIVE PERCENT: 0.2 %
EOSINOPHILS ABSOLUTE: 0.1 K/UL (ref 0–0.6)
EOSINOPHILS RELATIVE PERCENT: 1.2 %
ESTIMATED AVERAGE GLUCOSE: 108.3 MG/DL
HBA1C MFR BLD: 5.4 %
HCT VFR BLD CALC: 40.9 % (ref 36–48)
HEMOGLOBIN: 13.2 G/DL (ref 12–16)
HEPATITIS B SURFACE ANTIGEN INTERPRETATION: ABNORMAL
HIV AG/AB: NORMAL
HIV ANTIGEN: NORMAL
HIV-1 ANTIBODY: NORMAL
HIV-2 AB: NORMAL
LYMPHOCYTES ABSOLUTE: 1.7 K/UL (ref 1–5.1)
LYMPHOCYTES RELATIVE PERCENT: 19.9 %
MCH RBC QN AUTO: 26.7 PG (ref 26–34)
MCHC RBC AUTO-ENTMCNC: 32.3 G/DL (ref 31–36)
MCV RBC AUTO: 82.6 FL (ref 80–100)
MONOCYTES ABSOLUTE: 0.5 K/UL (ref 0–1.3)
MONOCYTES RELATIVE PERCENT: 6.4 %
NEUTROPHILS ABSOLUTE: 6.2 K/UL (ref 1.7–7.7)
NEUTROPHILS RELATIVE PERCENT: 72.3 %
PDW BLD-RTO: 16.8 % (ref 12.4–15.4)
PLATELET # BLD: 210 K/UL (ref 135–450)
PMV BLD AUTO: 8.4 FL (ref 5–10.5)
RBC # BLD: 4.95 M/UL (ref 4–5.2)
RUBELLA ANTIBODY IGG: 63.4 IU/ML
TOTAL SYPHILLIS IGG/IGM: ABNORMAL
WBC # BLD: 8.6 K/UL (ref 4–11)

## 2021-12-13 LAB
HCV QNT BY NAAT IU/ML: NOT DETECTED IU/ML
HCV QNT BY NAAT LOG IU/ML: NOT DETECTED LOG IU/ML
INTERPRETATION: NOT DETECTED

## 2022-01-13 ENCOUNTER — ROUTINE PRENATAL (OUTPATIENT)
Dept: OBGYN | Age: 37
End: 2022-01-13
Payer: MEDICARE

## 2022-01-13 VITALS — BODY MASS INDEX: 31.16 KG/M2 | SYSTOLIC BLOOD PRESSURE: 143 MMHG | WEIGHT: 211 LBS | DIASTOLIC BLOOD PRESSURE: 88 MMHG

## 2022-01-13 DIAGNOSIS — E66.9 OBESITY, UNSPECIFIED CLASSIFICATION, UNSPECIFIED OBESITY TYPE, UNSPECIFIED WHETHER SERIOUS COMORBIDITY PRESENT: ICD-10-CM

## 2022-01-13 DIAGNOSIS — O09.521 AMA (ADVANCED MATERNAL AGE) MULTIGRAVIDA 35+, FIRST TRIMESTER: ICD-10-CM

## 2022-01-13 DIAGNOSIS — O09.91 HIGH-RISK PREGNANCY, FIRST TRIMESTER: Primary | ICD-10-CM

## 2022-01-13 DIAGNOSIS — F17.211 CIGARETTE NICOTINE DEPENDENCE IN REMISSION: ICD-10-CM

## 2022-01-13 DIAGNOSIS — Z3A.17 17 WEEKS GESTATION OF PREGNANCY: ICD-10-CM

## 2022-01-13 PROCEDURE — 99213 OFFICE O/P EST LOW 20 MIN: CPT

## 2022-01-13 NOTE — PROGRESS NOTES
Return OB Office Visit    CC:   Chief Complaint   Patient presents with    Routine Prenatal Visit     Pt states she thought she was feeling baby move but has not felt anything in a while. pt has concerns about that. urine also collected for gc/ch and culture from new ob visit. HPI:  Patient seen and examined. No concerns/complaints. Denies VB, LOF, ctx. Pt states she thought she had been feeling flutters of movement, but the past week has noticed much less. Her nausea and general fatigue is also lessening over the past week. Denies headaches, vision changes, RUQ pain, increased LE edema. Denies chest pain, shortness of breath, fever, chills, nausea, vomiting. Review of Systems: The following ROS was otherwise negative, except as noted in the HPI: constitutional, HEENT, respiratory, cardiovascular, gastrointestinal, genitourinary, skin, musculoskeletal, neurological, psych    Objective:  BP (!) 143/88   Wt 211 lb (95.7 kg)   LMP 09/14/2021   BMI 31.16 kg/m²     Physical Exam  Vitals and nursing note reviewed. Constitutional:       General: She is not in acute distress. Appearance: Normal appearance. She is obese. HENT:      Head: Normocephalic and atraumatic. Pulmonary:      Effort: Pulmonary effort is normal. No respiratory distress. Musculoskeletal:         General: Normal range of motion. Cervical back: Normal range of motion. Skin:     General: Skin is warm and dry. Neurological:      General: No focal deficit present. Mental Status: She is alert and oriented to person, place, and time. Psychiatric:         Mood and Affect: Mood normal.         Speech: Speech normal.         Behavior: Behavior normal. Behavior is cooperative. Thought Content: Thought content normal.         Gravid, non tender, no flank pain    FHR: + by doppler    Assessment/Plan:   Danielle Chapa is a 43 y.o. [id number] at 17w2d who presents for routine OB visit     Diagnosis Orders   1. High-risk pregnancy, first trimester     2. 17 weeks gestation of pregnancy     3. Obesity, unspecified classification, unspecified obesity type, unspecified whether serious comorbidity present     4. AMA (advanced maternal age) multigravida 33+, first trimester     5. Cigarette nicotine dependence in remission         Culture, g&c urine today. BP recheck 140/88. Pt educated on home monitoring for headaches, blurred vision, light-headedness. No other concerns/complaints at this time.     Return in about 4 weeks (around 2/10/2022) for routine prenatal.    Rula Renner PA-C

## 2022-01-14 LAB
C. TRACHOMATIS DNA ,URINE: NEGATIVE
N. GONORRHOEAE DNA, URINE: NEGATIVE

## 2022-01-15 LAB
ORGANISM: ABNORMAL
URINE CULTURE, ROUTINE: ABNORMAL

## 2022-01-17 RX ORDER — NITROFURANTOIN 25; 75 MG/1; MG/1
100 CAPSULE ORAL 2 TIMES DAILY
Qty: 14 CAPSULE | Refills: 0 | Status: SHIPPED | OUTPATIENT
Start: 2022-01-17 | End: 2022-01-24

## 2022-02-10 ENCOUNTER — ROUTINE PRENATAL (OUTPATIENT)
Dept: OBGYN | Age: 37
End: 2022-02-10
Payer: MEDICARE

## 2022-02-10 VITALS — DIASTOLIC BLOOD PRESSURE: 89 MMHG | WEIGHT: 216 LBS | BODY MASS INDEX: 31.9 KG/M2 | SYSTOLIC BLOOD PRESSURE: 131 MMHG

## 2022-02-10 DIAGNOSIS — O09.92 HIGH RISK FOR INTRAPARTUM COMPLICATIONS IN SECOND TRIMESTER: ICD-10-CM

## 2022-02-10 DIAGNOSIS — Z3A.21 21 WEEKS GESTATION OF PREGNANCY: ICD-10-CM

## 2022-02-10 DIAGNOSIS — O09.521 AMA (ADVANCED MATERNAL AGE) MULTIGRAVIDA 35+, FIRST TRIMESTER: Primary | ICD-10-CM

## 2022-02-10 PROCEDURE — 99213 OFFICE O/P EST LOW 20 MIN: CPT | Performed by: OBSTETRICS & GYNECOLOGY

## 2022-02-10 PROCEDURE — G8419 CALC BMI OUT NRM PARAM NOF/U: HCPCS | Performed by: OBSTETRICS & GYNECOLOGY

## 2022-02-10 PROCEDURE — G8427 DOCREV CUR MEDS BY ELIG CLIN: HCPCS | Performed by: OBSTETRICS & GYNECOLOGY

## 2022-02-10 PROCEDURE — 4004F PT TOBACCO SCREEN RCVD TLK: CPT | Performed by: OBSTETRICS & GYNECOLOGY

## 2022-02-10 PROCEDURE — G8484 FLU IMMUNIZE NO ADMIN: HCPCS | Performed by: OBSTETRICS & GYNECOLOGY

## 2022-02-14 NOTE — PROGRESS NOTES
Return OB Office Visit    CC:   Chief Complaint   Patient presents with    Routine Prenatal Visit     pt states she is doing well w/ no today. HPI:  Patient seen and examined. No concerns/complaints. Denies VB, LOF, ctx. +Fm. Denies headaches, vision changes, RUQ pain, increased LE edema. Denies chest pain, shortness of breath, fever, chills, nausea, vomiting. Review of Systems: The following ROS was otherwise negative, except as noted in the HPI: constitutional, HEENT, respiratory, cardiovascular, gastrointestinal, genitourinary, skin, musculoskeletal, neurological, psych    Objective:  /89   Wt 216 lb (98 kg)   LMP 09/14/2021   BMI 31.90 kg/m²     Gravid, non tender, no flank pain    FHR: Positive by doppler    Assessment/Plan:   Tyree Donis is a 43 y.o. [id number] at [address] [place] [address] who presents for routine OB visit     Diagnosis Orders   1. AMA (advanced maternal age) multigravida 33+, first trimester     2. High risk for intrapartum complications in second trimester     3. 21 weeks gestation of pregnancy       No orders of the defined types were placed in this encounter. Return in about 4 weeks (around 3/10/2022).       Benoit Matos MD

## 2022-02-24 ENCOUNTER — ROUTINE PRENATAL (OUTPATIENT)
Dept: OBGYN | Age: 37
End: 2022-02-24
Payer: MEDICARE

## 2022-02-24 VITALS — BODY MASS INDEX: 32.05 KG/M2 | WEIGHT: 217 LBS | DIASTOLIC BLOOD PRESSURE: 84 MMHG | SYSTOLIC BLOOD PRESSURE: 146 MMHG

## 2022-02-24 DIAGNOSIS — O10.912 CHRONIC HYPERTENSION COMPLICATING OR REASON FOR CARE DURING PREGNANCY, SECOND TRIMESTER: ICD-10-CM

## 2022-02-24 DIAGNOSIS — O09.292 NEONATAL DEATH IN PRIOR PREGNANCY, CURRENTLY PREGNANT, SECOND TRIMESTER: Primary | ICD-10-CM

## 2022-02-24 DIAGNOSIS — O09.92 SUPERVISION OF HIGH RISK PREGNANCY IN SECOND TRIMESTER: ICD-10-CM

## 2022-02-24 DIAGNOSIS — Z3A.23 23 WEEKS GESTATION OF PREGNANCY: ICD-10-CM

## 2022-02-24 PROBLEM — Z32.01 PREGNANCY EXAMINATION OR TEST, POSITIVE RESULT: Status: RESOLVED | Noted: 2018-04-29 | Resolved: 2022-02-24

## 2022-02-24 PROBLEM — O26.90 PREGNANCY RELATED CONDITION: Status: RESOLVED | Noted: 2018-07-17 | Resolved: 2022-02-24

## 2022-02-24 PROBLEM — O02.81 CHEMICAL PREGNANCY: Status: RESOLVED | Noted: 2021-08-26 | Resolved: 2022-02-24

## 2022-02-24 PROBLEM — Z34.93 ENCOUNTER FOR PREGNANCY RELATED EXAMINATION, THIRD TRIMESTER: Status: RESOLVED | Noted: 2018-07-26 | Resolved: 2022-02-24

## 2022-02-24 LAB
ALT SERPL-CCNC: 9 U/L (ref 10–40)
AST SERPL-CCNC: 10 U/L (ref 15–37)
BASOPHILS ABSOLUTE: 0 K/UL (ref 0–0.2)
BASOPHILS RELATIVE PERCENT: 0.2 %
CREAT SERPL-MCNC: <0.5 MG/DL (ref 0.6–1.1)
CREATININE URINE: 74 MG/DL (ref 28–259)
EOSINOPHILS ABSOLUTE: 0.1 K/UL (ref 0–0.6)
EOSINOPHILS RELATIVE PERCENT: 1.1 %
GFR AFRICAN AMERICAN: >60
GFR NON-AFRICAN AMERICAN: >60
HCT VFR BLD CALC: 34.7 % (ref 36–48)
HEMOGLOBIN: 11.8 G/DL (ref 12–16)
LYMPHOCYTES ABSOLUTE: 1.1 K/UL (ref 1–5.1)
LYMPHOCYTES RELATIVE PERCENT: 18.6 %
MCH RBC QN AUTO: 28.7 PG (ref 26–34)
MCHC RBC AUTO-ENTMCNC: 33.9 G/DL (ref 31–36)
MCV RBC AUTO: 84.7 FL (ref 80–100)
MONOCYTES ABSOLUTE: 0.3 K/UL (ref 0–1.3)
MONOCYTES RELATIVE PERCENT: 4.9 %
NEUTROPHILS ABSOLUTE: 4.3 K/UL (ref 1.7–7.7)
NEUTROPHILS RELATIVE PERCENT: 75.2 %
PDW BLD-RTO: 14.3 % (ref 12.4–15.4)
PLATELET # BLD: 135 K/UL (ref 135–450)
PMV BLD AUTO: 8.7 FL (ref 5–10.5)
PROTEIN PROTEIN: 6 MG/DL
PROTEIN/CREAT RATIO: 0.1 MG/DL
RBC # BLD: 4.1 M/UL (ref 4–5.2)
URIC ACID, SERUM: 3.5 MG/DL (ref 2.6–6)
WBC # BLD: 5.8 K/UL (ref 4–11)

## 2022-02-24 PROCEDURE — 36415 COLL VENOUS BLD VENIPUNCTURE: CPT | Performed by: OBSTETRICS & GYNECOLOGY

## 2022-02-24 PROCEDURE — G8484 FLU IMMUNIZE NO ADMIN: HCPCS | Performed by: OBSTETRICS & GYNECOLOGY

## 2022-02-24 PROCEDURE — G8419 CALC BMI OUT NRM PARAM NOF/U: HCPCS | Performed by: OBSTETRICS & GYNECOLOGY

## 2022-02-24 PROCEDURE — 4004F PT TOBACCO SCREEN RCVD TLK: CPT | Performed by: OBSTETRICS & GYNECOLOGY

## 2022-02-24 PROCEDURE — 99213 OFFICE O/P EST LOW 20 MIN: CPT | Performed by: OBSTETRICS & GYNECOLOGY

## 2022-02-24 PROCEDURE — G8427 DOCREV CUR MEDS BY ELIG CLIN: HCPCS | Performed by: OBSTETRICS & GYNECOLOGY

## 2022-02-24 SDOH — ECONOMIC STABILITY: TRANSPORTATION INSECURITY
IN THE PAST 12 MONTHS, HAS LACK OF TRANSPORTATION KEPT YOU FROM MEETINGS, WORK, OR FROM GETTING THINGS NEEDED FOR DAILY LIVING?: NO

## 2022-02-24 SDOH — ECONOMIC STABILITY: TRANSPORTATION INSECURITY
IN THE PAST 12 MONTHS, HAS THE LACK OF TRANSPORTATION KEPT YOU FROM MEDICAL APPOINTMENTS OR FROM GETTING MEDICATIONS?: NO

## 2022-02-24 SDOH — ECONOMIC STABILITY: INCOME INSECURITY: HOW HARD IS IT FOR YOU TO PAY FOR THE VERY BASICS LIKE FOOD, HOUSING, MEDICAL CARE, AND HEATING?: NOT VERY HARD

## 2022-02-24 SDOH — ECONOMIC STABILITY: FOOD INSECURITY: WITHIN THE PAST 12 MONTHS, YOU WORRIED THAT YOUR FOOD WOULD RUN OUT BEFORE YOU GOT MONEY TO BUY MORE.: NEVER TRUE

## 2022-02-24 SDOH — ECONOMIC STABILITY: INCOME INSECURITY: IN THE LAST 12 MONTHS, WAS THERE A TIME WHEN YOU WERE NOT ABLE TO PAY THE MORTGAGE OR RENT ON TIME?: NO

## 2022-02-24 SDOH — ECONOMIC STABILITY: FOOD INSECURITY: WITHIN THE PAST 12 MONTHS, THE FOOD YOU BOUGHT JUST DIDN'T LAST AND YOU DIDN'T HAVE MONEY TO GET MORE.: NEVER TRUE

## 2022-02-24 SDOH — ECONOMIC STABILITY: HOUSING INSECURITY
IN THE LAST 12 MONTHS, WAS THERE A TIME WHEN YOU DID NOT HAVE A STEADY PLACE TO SLEEP OR SLEPT IN A SHELTER (INCLUDING NOW)?: NO

## 2022-02-24 NOTE — PROGRESS NOTES
Return OB Office Visit    CC:   Chief Complaint   Patient presents with    Routine Prenatal Visit     pt c/o ligament pain. ed       HPI:  Patient seen and examined. No concerns/complaints. Denies VB, LOF, ctx. +Fm. Denies headaches, vision changes, RUQ pain, increased LE edema. Denies chest pain, shortness of breath, fever, chills, nausea, vomiting. Review of Systems: The following ROS was otherwise negative, except as noted in the HPI: constitutional, HEENT, respiratory, cardiovascular, gastrointestinal, genitourinary, skin, musculoskeletal, neurological, psych    Objective:  BP (!) 146/84   Wt 217 lb (98.4 kg)   LMP 2021   BMI 32.05 kg/m²     Gravid, non tender, no flank pain    FHR: +by doppler    Assessment/Plan:   Esthela Coronado is a 43 y.o. [id number] at 23w2d who presents for routine OB visit     Diagnosis Orders   1.  death in prior pregnancy, currently pregnant, second trimester  US PREG UTERUS FOLLOW UP TRANSABD PER FETUS   2. Chronic hypertension complicating or reason for care during pregnancy, second trimester  AST(SGOT) & ALT(SGPT)    Creatinine    Uric Acid    CBC with Auto Differential    Protein / creatinine ratio, urine   3. 23 weeks gestation of pregnancy  AST(SGOT) & ALT(SGPT)    Creatinine    Uric Acid    CBC with Auto Differential    Protein / creatinine ratio, urine   4. Supervision of high risk pregnancy in second trimester  AST(SGOT) & ALT(SGPT)    Creatinine    Uric Acid    CBC with Auto Differential    Protein / creatinine ratio, urine     ptl precuations, pih precautions    Return in about 4 weeks (around 3/24/2022).       Maritza Lantigua MD

## 2022-03-24 ENCOUNTER — HOSPITAL ENCOUNTER (OUTPATIENT)
Age: 37
Discharge: HOME | End: 2022-03-24
Attending: OBSTETRICS & GYNECOLOGY | Admitting: OBSTETRICS & GYNECOLOGY
Payer: MEDICARE

## 2022-03-24 ENCOUNTER — ROUTINE PRENATAL (OUTPATIENT)
Dept: OBGYN | Age: 37
End: 2022-03-24
Payer: MEDICARE

## 2022-03-24 VITALS
WEIGHT: 220 LBS | HEART RATE: 84 BPM | HEIGHT: 69 IN | BODY MASS INDEX: 32.58 KG/M2 | RESPIRATION RATE: 17 BRPM | SYSTOLIC BLOOD PRESSURE: 126 MMHG | DIASTOLIC BLOOD PRESSURE: 76 MMHG | TEMPERATURE: 98.2 F

## 2022-03-24 VITALS — SYSTOLIC BLOOD PRESSURE: 135 MMHG | WEIGHT: 220 LBS | BODY MASS INDEX: 32.49 KG/M2 | DIASTOLIC BLOOD PRESSURE: 82 MMHG

## 2022-03-24 DIAGNOSIS — O36.8120 DECREASED FETAL MOVEMENTS IN SECOND TRIMESTER, SINGLE OR UNSPECIFIED FETUS: ICD-10-CM

## 2022-03-24 DIAGNOSIS — O09.521 AMA (ADVANCED MATERNAL AGE) MULTIGRAVIDA 35+, FIRST TRIMESTER: ICD-10-CM

## 2022-03-24 DIAGNOSIS — O09.92 SUPERVISION OF HIGH RISK PREGNANCY IN SECOND TRIMESTER: Primary | ICD-10-CM

## 2022-03-24 DIAGNOSIS — O10.912 CHRONIC HYPERTENSION COMPLICATING OR REASON FOR CARE DURING PREGNANCY, SECOND TRIMESTER: ICD-10-CM

## 2022-03-24 DIAGNOSIS — Z3A.27 27 WEEKS GESTATION OF PREGNANCY: ICD-10-CM

## 2022-03-24 DIAGNOSIS — Z34.82 PRENATAL CARE, SUBSEQUENT PREGNANCY, SECOND TRIMESTER: ICD-10-CM

## 2022-03-24 DIAGNOSIS — O99.810 ABNORMAL GLUCOSE AFFECTING PREGNANCY: Primary | ICD-10-CM

## 2022-03-24 DIAGNOSIS — O09.292 NEONATAL DEATH IN PRIOR PREGNANCY, CURRENTLY PREGNANT, SECOND TRIMESTER: ICD-10-CM

## 2022-03-24 LAB
CREATININE URINE: 109.7 MG/DL (ref 28–259)
GLUCOSE CHALLENGE: 162 MG/DL
HCT VFR BLD CALC: 34.6 % (ref 36–48)
HEMOGLOBIN: 11.5 G/DL (ref 12–16)
MCH RBC QN AUTO: 27.6 PG (ref 26–34)
MCHC RBC AUTO-ENTMCNC: 33.3 G/DL (ref 31–36)
MCV RBC AUTO: 82.9 FL (ref 80–100)
PDW BLD-RTO: 14.4 % (ref 12.4–15.4)
PLATELET # BLD: 132 K/UL (ref 135–450)
PMV BLD AUTO: 8.8 FL (ref 5–10.5)
PROTEIN PROTEIN: 21 MG/DL
PROTEIN/CREAT RATIO: 0.2 MG/DL
RBC # BLD: 4.17 M/UL (ref 4–5.2)
WBC # BLD: 5.8 K/UL (ref 4–11)

## 2022-03-24 PROCEDURE — 4004F PT TOBACCO SCREEN RCVD TLK: CPT

## 2022-03-24 PROCEDURE — G8419 CALC BMI OUT NRM PARAM NOF/U: HCPCS

## 2022-03-24 PROCEDURE — 36415 COLL VENOUS BLD VENIPUNCTURE: CPT | Performed by: OBSTETRICS & GYNECOLOGY

## 2022-03-24 PROCEDURE — G8427 DOCREV CUR MEDS BY ELIG CLIN: HCPCS

## 2022-03-24 PROCEDURE — 99213 OFFICE O/P EST LOW 20 MIN: CPT

## 2022-03-24 PROCEDURE — G8484 FLU IMMUNIZE NO ADMIN: HCPCS

## 2022-03-24 PROCEDURE — 59025 FETAL NON-STRESS TEST: CPT

## 2022-03-24 SDOH — ECONOMIC STABILITY: INCOME INSECURITY: HOW HARD IS IT FOR YOU TO PAY FOR THE VERY BASICS LIKE FOOD, HOUSING, MEDICAL CARE, AND HEATING?: NOT VERY HARD

## 2022-03-24 SDOH — ECONOMIC STABILITY: INCOME INSECURITY: IN THE LAST 12 MONTHS, WAS THERE A TIME WHEN YOU WERE NOT ABLE TO PAY THE MORTGAGE OR RENT ON TIME?: YES

## 2022-03-24 SDOH — ECONOMIC STABILITY: FOOD INSECURITY: WITHIN THE PAST 12 MONTHS, YOU WORRIED THAT YOUR FOOD WOULD RUN OUT BEFORE YOU GOT MONEY TO BUY MORE.: SOMETIMES TRUE

## 2022-03-24 SDOH — ECONOMIC STABILITY: FOOD INSECURITY: WITHIN THE PAST 12 MONTHS, THE FOOD YOU BOUGHT JUST DIDN'T LAST AND YOU DIDN'T HAVE MONEY TO GET MORE.: SOMETIMES TRUE

## 2022-03-24 SDOH — ECONOMIC STABILITY: HOUSING INSECURITY
IN THE LAST 12 MONTHS, WAS THERE A TIME WHEN YOU DID NOT HAVE A STEADY PLACE TO SLEEP OR SLEPT IN A SHELTER (INCLUDING NOW)?: YES

## 2022-03-24 NOTE — PROGRESS NOTES
[id number] at 27w2d who presents for routine OB visit     Diagnosis Orders   1. Supervision of high risk pregnancy in second trimester     2. 27 weeks gestation of pregnancy  CBC    Glucose Challenge Gestational    Syphilis Antibody Cascading Reflex   3. Prenatal care, subsequent pregnancy, second trimester  CBC    Glucose Challenge Gestational    Syphilis Antibody Cascading Reflex    POCT Urinalysis no Micro   4. Decreased fetal movements in second trimester, single or unspecified fetus  MI FETAL NON-STRESS TEST   5.  death in prior pregnancy, currently pregnant, second trimester     6. Chronic hypertension complicating or reason for care during pregnancy, second trimester     7. AMA (advanced maternal age) multigravida 33+, first trimester       1 hr GTT today, also urinalysis. Order placed for NST at [facility], pt instructed to go after appt today and she verbalizes understanding. No other concerns/complaints, pt may have work restriction note if needed.     Return in about 2 weeks (around 2022) for routine prenatal.    Cheyanne Wong PA-C

## 2022-03-24 NOTE — FLOWSHEET NOTE
Discharge instructions, including labor signs and reinforced fetal movement/kick counts. Pt voiced understanding. Follow up care instructions provided to patient. Pt denies any questions or concerns.

## 2022-03-24 NOTE — FLOWSHEET NOTE
Education regarding fetal kick count/movement and how to perform fetal kick counts. Fetal kick count paper provided to patient. Pt voiced understanding.

## 2022-03-24 NOTE — FLOWSHEET NOTE
Telephone Dr. Joseline Basilio to update him that patient is feeling her baby move and reactive tracing has been obtained. Orders received for discharge and instructions for follow up care.

## 2022-03-24 NOTE — FLOWSHEET NOTE
Pt presents ambulatory to birthing center for NST due to decreased fetal movement that started last night. Pt was seen in the office today at [facility] AT [place]. Pt history of chronic hypertension without medication and is AMA. Abdomen soft and non tender to touch/palpation. Pt denies abdominal pain, tenderness or contractions. Denies vaginal bleeding or leaking of fluid today. EFM and toco applied. . Audible movement noted on monitor and patient has felt a few fetal kick/movements since arriving. Pt oriented to room and call light. Triage and assessment completed.

## 2022-03-25 LAB — TOTAL SYPHILLIS IGG/IGM: NORMAL

## 2022-04-01 ENCOUNTER — HOSPITAL ENCOUNTER (OUTPATIENT)
Age: 37
Discharge: HOME | End: 2022-04-02
Attending: OBSTETRICS & GYNECOLOGY | Admitting: OBSTETRICS & GYNECOLOGY
Payer: MEDICARE

## 2022-04-01 VITALS
DIASTOLIC BLOOD PRESSURE: 72 MMHG | SYSTOLIC BLOOD PRESSURE: 124 MMHG | RESPIRATION RATE: 16 BRPM | TEMPERATURE: 98.4 F | WEIGHT: 220 LBS | HEIGHT: 69 IN | BODY MASS INDEX: 32.58 KG/M2 | HEART RATE: 88 BPM | OXYGEN SATURATION: 96 %

## 2022-04-01 LAB
ALBUMIN SERPL-MCNC: 3.5 GM/DL (ref 3.4–5)
ALP BLD-CCNC: 64 IU/L (ref 40–129)
ALT SERPL-CCNC: 12 U/L (ref 10–40)
ANION GAP SERPL CALCULATED.3IONS-SCNC: 8 MMOL/L (ref 4–16)
AST SERPL-CCNC: 11 IU/L (ref 15–37)
BASOPHILS ABSOLUTE: 0 K/CU MM
BASOPHILS RELATIVE PERCENT: 0.3 % (ref 0–1)
BILIRUB SERPL-MCNC: 0.1 MG/DL (ref 0–1)
BUN BLDV-MCNC: 6 MG/DL (ref 6–23)
CALCIUM SERPL-MCNC: 9.1 MG/DL (ref 8.3–10.6)
CHLORIDE BLD-SCNC: 107 MMOL/L (ref 99–110)
CO2: 22 MMOL/L (ref 21–32)
CREAT SERPL-MCNC: 0.5 MG/DL (ref 0.6–1.1)
CREATININE URINE: 122.8 MG/DL (ref 28–217)
DIFFERENTIAL TYPE: ABNORMAL
EOSINOPHILS ABSOLUTE: 0.1 K/CU MM
EOSINOPHILS RELATIVE PERCENT: 1 % (ref 0–3)
GFR AFRICAN AMERICAN: >60 ML/MIN/1.73M2
GFR NON-AFRICAN AMERICAN: >60 ML/MIN/1.73M2
GLUCOSE BLD-MCNC: 120 MG/DL (ref 70–99)
HCT VFR BLD CALC: 33.3 % (ref 37–47)
HEMOGLOBIN: 10.7 GM/DL (ref 12.5–16)
IMMATURE NEUTROPHIL %: 0.8 % (ref 0–0.43)
LYMPHOCYTES ABSOLUTE: 1.7 K/CU MM
LYMPHOCYTES RELATIVE PERCENT: 23.2 % (ref 24–44)
MAGNESIUM: 1.7 MG/DL (ref 1.8–2.4)
MCH RBC QN AUTO: 26.7 PG (ref 27–31)
MCHC RBC AUTO-ENTMCNC: 32.1 % (ref 32–36)
MCV RBC AUTO: 83 FL (ref 78–100)
MONOCYTES ABSOLUTE: 0.6 K/CU MM
MONOCYTES RELATIVE PERCENT: 7.9 % (ref 0–4)
NUCLEATED RBC %: 0 %
PDW BLD-RTO: 13.6 % (ref 11.7–14.9)
PLATELET # BLD: 150 K/CU MM (ref 140–440)
PMV BLD AUTO: 10.4 FL (ref 7.5–11.1)
POTASSIUM SERPL-SCNC: 3.5 MMOL/L (ref 3.5–5.1)
PROT/CREAT RATIO, UR: 0.2
RBC # BLD: 4.01 M/CU MM (ref 4.2–5.4)
SEGMENTED NEUTROPHILS ABSOLUTE COUNT: 4.9 K/CU MM
SEGMENTED NEUTROPHILS RELATIVE PERCENT: 66.8 % (ref 36–66)
SODIUM BLD-SCNC: 137 MMOL/L (ref 135–145)
TOTAL IMMATURE NEUTOROPHIL: 0.06 K/CU MM
TOTAL NUCLEATED RBC: 0 K/CU MM
TOTAL PROTEIN: 6.2 GM/DL (ref 6.4–8.2)
URIC ACID: 3.2 MG/DL (ref 2.6–6)
URINE TOTAL PROTEIN: 20.7 MG/DL
WBC # BLD: 7.3 K/CU MM (ref 4–10.5)

## 2022-04-01 PROCEDURE — 80053 COMPREHEN METABOLIC PANEL: CPT

## 2022-04-01 PROCEDURE — 82570 ASSAY OF URINE CREATININE: CPT

## 2022-04-01 PROCEDURE — 83735 ASSAY OF MAGNESIUM: CPT

## 2022-04-01 PROCEDURE — 84550 ASSAY OF BLOOD/URIC ACID: CPT

## 2022-04-01 PROCEDURE — 84156 ASSAY OF PROTEIN URINE: CPT

## 2022-04-01 PROCEDURE — 85025 COMPLETE CBC W/AUTO DIFF WBC: CPT

## 2022-04-02 PROCEDURE — 99203 OFFICE O/P NEW LOW 30 MIN: CPT

## 2022-04-02 NOTE — FLOWSHEET NOTE
Pt arrived to triage with c/o decreased fetal movement. Denies leaking of fluid and or bleeding. Denies complications with pregnancy. States she has not felt baby move a lot since this morning. EFM and TOCO applied to soft non tender abdomen. Audible heart tones.

## 2022-04-07 ENCOUNTER — ROUTINE PRENATAL (OUTPATIENT)
Dept: OBGYN | Age: 37
End: 2022-04-07
Payer: MEDICARE

## 2022-04-07 VITALS — DIASTOLIC BLOOD PRESSURE: 81 MMHG | BODY MASS INDEX: 32.78 KG/M2 | WEIGHT: 222 LBS | SYSTOLIC BLOOD PRESSURE: 148 MMHG

## 2022-04-07 DIAGNOSIS — O09.293 HISTORY OF STILLBIRTH IN CURRENTLY PREGNANT PATIENT, THIRD TRIMESTER: ICD-10-CM

## 2022-04-07 DIAGNOSIS — Z3A.29 29 WEEKS GESTATION OF PREGNANCY: ICD-10-CM

## 2022-04-07 DIAGNOSIS — O99.810 ABNORMAL O'SULLIVAN GLUCOSE CHALLENGE TEST, ANTEPARTUM: ICD-10-CM

## 2022-04-07 DIAGNOSIS — O99.213 OBESITY AFFECTING PREGNANCY IN THIRD TRIMESTER: Primary | ICD-10-CM

## 2022-04-07 DIAGNOSIS — O09.93 SUPERVISION OF HIGH RISK PREGNANCY IN THIRD TRIMESTER: ICD-10-CM

## 2022-04-07 PROCEDURE — G8428 CUR MEDS NOT DOCUMENT: HCPCS | Performed by: OBSTETRICS & GYNECOLOGY

## 2022-04-07 PROCEDURE — G8419 CALC BMI OUT NRM PARAM NOF/U: HCPCS | Performed by: OBSTETRICS & GYNECOLOGY

## 2022-04-07 PROCEDURE — 99213 OFFICE O/P EST LOW 20 MIN: CPT | Performed by: OBSTETRICS & GYNECOLOGY

## 2022-04-07 PROCEDURE — 1036F TOBACCO NON-USER: CPT | Performed by: OBSTETRICS & GYNECOLOGY

## 2022-04-07 NOTE — PROGRESS NOTES
Return OB Office Visit    CC:   Chief Complaint   Patient presents with    Routine Prenatal Visit     no c/o would like to discuss u/s. scheduled for a 3hr gtt 4/14/22. ns       HPI:  Patient seen and examined. No concerns/complaints. Denies VB, LOF, ctx. +Fm. Denies headaches, vision changes, RUQ pain, increased LE edema. Denies chest pain, shortness of breath, fever, chills, nausea, vomiting. Review of Systems: The following ROS was otherwise negative, except as noted in the HPI: constitutional, HEENT, respiratory, cardiovascular, gastrointestinal, genitourinary, skin, musculoskeletal, neurological, psych    Objective:  BP (!) 148/81   Wt 222 lb (100.7 kg)   LMP 09/14/2021   BMI 32.78 kg/m²     Gravid, non tender, no flank pain    FHR: +by doppler    Assessment/Plan:   Mu Marie is a 43 y.o. [id number] at 29w2d who presents for routine OB visit     Diagnosis Orders   1. Obesity affecting pregnancy in third trimester  Hemoglobin A1C    US NON OB TRANSVAGINAL   2. Supervision of high risk pregnancy in third trimester     3. 29 weeks gestation of pregnancy     4. History of stillbirth in currently pregnant patient, third trimester     5. Abnormal [facility]'James glucose challenge test, antepartum  Hemoglobin A1C    US NON OB TRANSVAGINAL     Fkcs, labor precautions  Return in about 2 weeks (around 4/21/2022).       Roly Awan MD

## 2022-04-14 ENCOUNTER — NURSE ONLY (OUTPATIENT)
Dept: OBGYN | Age: 37
End: 2022-04-14
Payer: MEDICARE

## 2022-04-14 DIAGNOSIS — O99.810 ABNORMAL MATERNAL GLUCOSE TOLERANCE, ANTEPARTUM: Primary | ICD-10-CM

## 2022-04-14 LAB
GLUCOSE FASTING: 90 MG/DL
GLUCOSE TOLERANCE TEST 1 HOUR: 211 MG/DL
GLUCOSE TOLERANCE TEST 2 HOUR: 179 MG/DL
GLUCOSE TOLERANCE TEST 3 HOUR: 106 MG/DL

## 2022-04-14 PROCEDURE — 36415 COLL VENOUS BLD VENIPUNCTURE: CPT | Performed by: OBSTETRICS & GYNECOLOGY

## 2022-04-15 DIAGNOSIS — O24.410 GDM (GESTATIONAL DIABETES MELLITUS), CLASS A1: Primary | ICD-10-CM

## 2022-04-15 RX ORDER — GLUCOSAMINE HCL/CHONDROITIN SU 500-400 MG
CAPSULE ORAL
Qty: 200 STRIP | Refills: 3 | Status: ON HOLD | OUTPATIENT
Start: 2022-04-15 | End: 2022-07-27 | Stop reason: HOSPADM

## 2022-04-15 RX ORDER — LANCETS 30 GAUGE
1 EACH MISCELLANEOUS 4 TIMES DAILY
Qty: 200 EACH | Refills: 5 | Status: SHIPPED | OUTPATIENT
Start: 2022-04-15 | End: 2022-08-02 | Stop reason: ALTCHOICE

## 2022-04-15 RX ORDER — BLOOD-GLUCOSE METER
1 KIT MISCELLANEOUS DAILY
Qty: 1 KIT | Refills: 0 | Status: SHIPPED | OUTPATIENT
Start: 2022-04-15 | End: 2022-08-02 | Stop reason: ALTCHOICE

## 2022-04-21 ENCOUNTER — ROUTINE PRENATAL (OUTPATIENT)
Dept: OBGYN | Age: 37
End: 2022-04-21
Payer: MEDICARE

## 2022-04-21 VITALS — SYSTOLIC BLOOD PRESSURE: 132 MMHG | WEIGHT: 224 LBS | BODY MASS INDEX: 33.08 KG/M2 | DIASTOLIC BLOOD PRESSURE: 80 MMHG

## 2022-04-21 DIAGNOSIS — O09.293 HISTORY OF STILLBIRTH IN CURRENTLY PREGNANT PATIENT, THIRD TRIMESTER: ICD-10-CM

## 2022-04-21 DIAGNOSIS — O09.93 SUPERVISION OF HIGH RISK PREGNANCY IN THIRD TRIMESTER: ICD-10-CM

## 2022-04-21 DIAGNOSIS — O24.410 GDM (GESTATIONAL DIABETES MELLITUS), CLASS A1: Primary | ICD-10-CM

## 2022-04-21 DIAGNOSIS — Z3A.31 31 WEEKS GESTATION OF PREGNANCY: ICD-10-CM

## 2022-04-21 PROCEDURE — G8427 DOCREV CUR MEDS BY ELIG CLIN: HCPCS | Performed by: OBSTETRICS & GYNECOLOGY

## 2022-04-21 PROCEDURE — G8419 CALC BMI OUT NRM PARAM NOF/U: HCPCS | Performed by: OBSTETRICS & GYNECOLOGY

## 2022-04-21 PROCEDURE — 99213 OFFICE O/P EST LOW 20 MIN: CPT | Performed by: OBSTETRICS & GYNECOLOGY

## 2022-04-21 PROCEDURE — 1036F TOBACCO NON-USER: CPT | Performed by: OBSTETRICS & GYNECOLOGY

## 2022-04-21 NOTE — PROGRESS NOTES
Return OB Office Visit    CC:   Chief Complaint   Patient presents with    Routine Prenatal Visit     no c/o. HPI:  Patient seen and examined. No concerns/complaints. Denies VB, LOF, ctx. +Fm. Denies headaches, vision changes, RUQ pain, increased LE edema. Denies chest pain, shortness of breath, fever, chills, nausea, vomiting. Review of Systems: The following ROS was otherwise negative, except as noted in the HPI: constitutional, HEENT, respiratory, cardiovascular, gastrointestinal, genitourinary, skin, musculoskeletal, neurological, psych    Objective:  /80   Wt 224 lb (101.6 kg)   LMP 09/14/2021   BMI 33.08 kg/m²     Gravid, non tender, no flank pain    FHR: +by doppler    Assessment/Plan:   Yandel Sow is a 43 y.o. [id number] at 31w2d who presents for routine OB visit     Diagnosis Orders   1. GDM (gestational diabetes mellitus), class A1  US PREG UTERUS FOLLOW UP TRANSABD PER FETUS    US FETAL BIOPHYSICAL PROFILE WO NON STRESS TESTING   2. Supervision of high risk pregnancy in third trimester  US PREG UTERUS FOLLOW UP TRANSABD PER FETUS    US FETAL BIOPHYSICAL PROFILE WO NON STRESS TESTING   3. History of stillbirth in currently pregnant patient, third trimester  US PREG UTERUS FOLLOW UP TRANSABD PER FETUS    US FETAL BIOPHYSICAL PROFILE WO NON STRESS TESTING   4. 31 weeks gestation of pregnancy       Fkcs, ptl precautions  Begin apft next week  Patient reports her insurance company is denying glucometer, will attempt to PA    Return in about 2 weeks (around 5/5/2022).       Constantine Riley MD

## 2022-05-03 ENCOUNTER — ROUTINE PRENATAL (OUTPATIENT)
Dept: OBGYN | Age: 37
End: 2022-05-03
Payer: MEDICARE

## 2022-05-03 VITALS — SYSTOLIC BLOOD PRESSURE: 131 MMHG | WEIGHT: 223 LBS | DIASTOLIC BLOOD PRESSURE: 79 MMHG | BODY MASS INDEX: 32.93 KG/M2

## 2022-05-03 DIAGNOSIS — O99.213 OBESITY AFFECTING PREGNANCY IN THIRD TRIMESTER, ANTEPARTUM: ICD-10-CM

## 2022-05-03 DIAGNOSIS — O24.410 GDM, CLASS A1: ICD-10-CM

## 2022-05-03 DIAGNOSIS — O09.93 SUPERVISION OF HIGH RISK PREGNANCY IN THIRD TRIMESTER: Primary | ICD-10-CM

## 2022-05-03 DIAGNOSIS — O09.293 HISTORY OF STILLBIRTH IN CURRENTLY PREGNANT PATIENT, THIRD TRIMESTER: ICD-10-CM

## 2022-05-03 DIAGNOSIS — Z3A.33 33 WEEKS GESTATION OF PREGNANCY: ICD-10-CM

## 2022-05-03 PROCEDURE — G8419 CALC BMI OUT NRM PARAM NOF/U: HCPCS | Performed by: OBSTETRICS & GYNECOLOGY

## 2022-05-03 PROCEDURE — 99213 OFFICE O/P EST LOW 20 MIN: CPT | Performed by: OBSTETRICS & GYNECOLOGY

## 2022-05-03 PROCEDURE — 1036F TOBACCO NON-USER: CPT | Performed by: OBSTETRICS & GYNECOLOGY

## 2022-05-03 PROCEDURE — G8427 DOCREV CUR MEDS BY ELIG CLIN: HCPCS | Performed by: OBSTETRICS & GYNECOLOGY

## 2022-05-03 NOTE — PROGRESS NOTES
Return OB Office Visit    CC:   Chief Complaint   Patient presents with    Routine Prenatal Visit     pt states unable to check blood glucose , having issues with insurance to ok. monitor and strips. u/s today. ed       HPI:  Patient seen and examined. No concerns/complaints. Denies VB, LOF, ctx. +Fm. Denies headaches, vision changes, RUQ pain, increased LE edema. Denies chest pain, shortness of breath, fever, chills, nausea, vomiting. Review of Systems: The following ROS was otherwise negative, except as noted in the HPI: constitutional, HEENT, respiratory, cardiovascular, gastrointestinal, genitourinary, skin, musculoskeletal, neurological, psych    Objective:  /79   Wt 223 lb (101.2 kg)   LMP 09/14/2021   BMI 32.93 kg/m²     Gravid, non tender, no flank pain    FHR: +by doppler    Assessment/Plan:   Carmen Conner is a 43 y.o. [id number] at 33w0d who presents for routine OB visit     Diagnosis Orders   1. Supervision of high risk pregnancy in third trimester     2. Obesity affecting pregnancy in third trimester, antepartum     3. GDM, class A1     4. 33 weeks gestation of pregnancy     5. History of stillbirth in currently pregnant patient, third trimester       Fkcs, ptl precuations  Check status of glucometer, any gljucometer is OK    Return in about 1 week (around 5/10/2022).       Malka Mahoney MD

## 2022-05-05 ENCOUNTER — ROUTINE PRENATAL (OUTPATIENT)
Dept: OBGYN | Age: 37
End: 2022-05-05
Payer: MEDICARE

## 2022-05-05 VITALS — SYSTOLIC BLOOD PRESSURE: 123 MMHG | BODY MASS INDEX: 32.93 KG/M2 | WEIGHT: 223 LBS | DIASTOLIC BLOOD PRESSURE: 79 MMHG

## 2022-05-05 DIAGNOSIS — O24.419 GDM, CLASS A2: ICD-10-CM

## 2022-05-05 DIAGNOSIS — Z3A.33 33 WEEKS GESTATION OF PREGNANCY: ICD-10-CM

## 2022-05-05 DIAGNOSIS — O09.93 SUPERVISION OF HIGH RISK PREGNANCY IN THIRD TRIMESTER: Primary | ICD-10-CM

## 2022-05-05 PROCEDURE — 59025 FETAL NON-STRESS TEST: CPT | Performed by: OBSTETRICS & GYNECOLOGY

## 2022-05-05 NOTE — PROGRESS NOTES
5/5/22    Joce Torres  1985    Chief Complaint   Patient presents with    Non-stress Test     pt here for NST, due to GDM. Joce Torres is a 43 y.o. female who presents today for NST because of diabetes. The baseline fetal heart rate is 140. It is category II. The variability is moderate. Decelerations are absent. Accelerations are 15 beats/min or greater      Current Outpatient Medications   Medication Sig Dispense Refill    blood glucose monitor strips Test 4 times a day & as needed for symptoms of irregular blood glucose. Dispense sufficient amount for indicated testing frequency plus additional to accommodate PRN testing needs. 200 strip 3    glucose monitoring (FREESTYLE FREEDOM) kit 1 kit by Does not apply route daily 1 kit 0    Lancets MISC 1 each by Does not apply route 4 times daily 200 each 5    Prenatal MV-Min-FA-Omega-3 (PRENATAL GUMMIES/DHA & FA) 0.4-32.5 MG CHEW Take 1 tablet by mouth daily 30 tablet 11     No current facility-administered medications for this visit. No Known Allergies    [id number]    /79   Wt 223 lb (101.2 kg)   LMP 09/14/2021   BMI 32.93 kg/m²     Physical Exam    ASSESSMENT AND PLAN   Diagnosis Orders   1. Supervision of high risk pregnancy in third trimester     2. 33 weeks gestation of pregnancy     3. GDM, class A2         Return in about 1 week (around 5/12/2022).     Caitie Roper MD

## 2022-05-10 ENCOUNTER — ROUTINE PRENATAL (OUTPATIENT)
Dept: OBGYN | Age: 37
End: 2022-05-10
Payer: MEDICARE

## 2022-05-10 VITALS — DIASTOLIC BLOOD PRESSURE: 72 MMHG | BODY MASS INDEX: 32.61 KG/M2 | SYSTOLIC BLOOD PRESSURE: 126 MMHG | WEIGHT: 220.8 LBS

## 2022-05-10 DIAGNOSIS — O24.419 GDM, CLASS A2: Primary | ICD-10-CM

## 2022-05-10 DIAGNOSIS — O09.93 SUPERVISION OF HIGH RISK PREGNANCY IN THIRD TRIMESTER: ICD-10-CM

## 2022-05-10 DIAGNOSIS — O09.293 HISTORY OF STILLBIRTH IN CURRENTLY PREGNANT PATIENT, THIRD TRIMESTER: ICD-10-CM

## 2022-05-10 DIAGNOSIS — Z20.2 STD EXPOSURE: ICD-10-CM

## 2022-05-10 DIAGNOSIS — Z3A.34 34 WEEKS GESTATION OF PREGNANCY: ICD-10-CM

## 2022-05-10 LAB — HEPATITIS B SURFACE ANTIGEN INTERPRETATION: NORMAL

## 2022-05-10 PROCEDURE — 99213 OFFICE O/P EST LOW 20 MIN: CPT | Performed by: OBSTETRICS & GYNECOLOGY

## 2022-05-10 PROCEDURE — G8419 CALC BMI OUT NRM PARAM NOF/U: HCPCS | Performed by: OBSTETRICS & GYNECOLOGY

## 2022-05-10 PROCEDURE — G8427 DOCREV CUR MEDS BY ELIG CLIN: HCPCS | Performed by: OBSTETRICS & GYNECOLOGY

## 2022-05-10 PROCEDURE — 1036F TOBACCO NON-USER: CPT | Performed by: OBSTETRICS & GYNECOLOGY

## 2022-05-10 PROCEDURE — 36415 COLL VENOUS BLD VENIPUNCTURE: CPT | Performed by: OBSTETRICS & GYNECOLOGY

## 2022-05-10 RX ORDER — METFORMIN HYDROCHLORIDE 500 MG/1
500 TABLET, EXTENDED RELEASE ORAL
Qty: 30 TABLET | Refills: 3 | Status: ON HOLD | OUTPATIENT
Start: 2022-05-10 | End: 2022-06-17 | Stop reason: HOSPADM

## 2022-05-10 RX ORDER — CHOLECALCIFEROL (VITAMIN D3) 25 MCG
1 TABLET,CHEWABLE ORAL DAILY
Qty: 90 CAPSULE | Refills: 3 | Status: ON HOLD | OUTPATIENT
Start: 2022-05-10 | End: 2022-06-23 | Stop reason: HOSPADM

## 2022-05-10 NOTE — PROGRESS NOTES
Return OB Office Visit    CC:   Chief Complaint   Patient presents with    Routine Prenatal Visit     pt has u/s, pt unable to void for visit, pt has no c/o. HPI:  Patient seen and examined. No concerns/complaints. Denies VB, LOF, ctx. +Fm. Denies headaches, vision changes, RUQ pain, increased LE edema. Denies chest pain, shortness of breath, fever, chills, nausea, vomiting. Review of Systems: The following ROS was otherwise negative, except as noted in the HPI: constitutional, HEENT, respiratory, cardiovascular, gastrointestinal, genitourinary, skin, musculoskeletal, neurological, psych    Objective:  /72   Wt 220 lb 12.8 oz (100.2 kg)   LMP 09/14/2021   BMI 32.61 kg/m²     Gravid, non tender, no flank pain    FHR: +by doppler    Assessment/Plan:   Jamie Davis is a 43 y.o. [id number] at 34w0d who presents for routine OB visit     Diagnosis Orders   1. GDM, class A2  Hemoglobin A1C    US FETAL BIOPHYSICAL PROFILE WO NON STRESS TESTING   2. Supervision of high risk pregnancy in third trimester  Prenatal Vit-Fe Sulfate-FA-DHA (PRENATAL VITAMIN/MIN +DHA) 27-0.8-200 MG CAPS   3. History of stillbirth in currently pregnant patient, third trimester  US FETAL BIOPHYSICAL PROFILE WO NON STRESS TESTING   4. 34 weeks gestation of pregnancy     5. STD exposure  Hepatitis B Surface Antigen    RPR Reflex to Titer and TPPA    Herpes Simplex Virus,I/II,IgG    HIV Screen    C.trachomatis N.gonorrhoeae DNA, Urine    Hep C AB RLFX HCV PCR-A     Begin glucophage 500mg due to elevated fasting blood sugar  Patient never attended gdm class, discussed importance  Fkcs, ptl precautons  Twice weekly apft  Return in about 1 week (around 5/17/2022).       Ally Umana MD

## 2022-05-11 LAB
C. TRACHOMATIS DNA ,URINE: NEGATIVE
ESTIMATED AVERAGE GLUCOSE: 105.4 MG/DL
HBA1C MFR BLD: 5.3 %
HERPES SIMPLEX VIRUS 1 IGG: POSITIVE
HERPES SIMPLEX VIRUS 2 IGG: NEGATIVE
HIV AG/AB: NORMAL
HIV ANTIGEN: NORMAL
HIV-1 ANTIBODY: NORMAL
HIV-2 AB: NORMAL
N. GONORRHOEAE DNA, URINE: NEGATIVE
TOTAL SYPHILLIS IGG/IGM: NORMAL

## 2022-05-12 LAB
HEPATITIS C VIRUS AB BY CIA INDEX: 0.06 IV
HEPATITIS C VIRUS AB BY CIA INTERPRETATION: NEGATIVE

## 2022-05-17 ENCOUNTER — ROUTINE PRENATAL (OUTPATIENT)
Dept: OBGYN | Age: 37
End: 2022-05-17
Payer: MEDICARE

## 2022-05-17 VITALS — DIASTOLIC BLOOD PRESSURE: 75 MMHG | WEIGHT: 222 LBS | SYSTOLIC BLOOD PRESSURE: 136 MMHG | BODY MASS INDEX: 32.78 KG/M2

## 2022-05-17 DIAGNOSIS — Z3A.35 35 WEEKS GESTATION OF PREGNANCY: ICD-10-CM

## 2022-05-17 DIAGNOSIS — O24.419 GDM, CLASS A2: ICD-10-CM

## 2022-05-17 DIAGNOSIS — O09.93 SUPERVISION OF HIGH RISK PREGNANCY IN THIRD TRIMESTER: Primary | ICD-10-CM

## 2022-05-17 PROCEDURE — 1036F TOBACCO NON-USER: CPT | Performed by: OBSTETRICS & GYNECOLOGY

## 2022-05-17 PROCEDURE — 99213 OFFICE O/P EST LOW 20 MIN: CPT | Performed by: OBSTETRICS & GYNECOLOGY

## 2022-05-17 PROCEDURE — G8427 DOCREV CUR MEDS BY ELIG CLIN: HCPCS | Performed by: OBSTETRICS & GYNECOLOGY

## 2022-05-17 PROCEDURE — G8419 CALC BMI OUT NRM PARAM NOF/U: HCPCS | Performed by: OBSTETRICS & GYNECOLOGY

## 2022-05-17 NOTE — PROGRESS NOTES
Return OB Office Visit    CC:   Chief Complaint   Patient presents with    Routine Prenatal Visit     pt c/o pressure. u/s today. ed       HPI:  Patient seen and examined. No concerns/complaints. Denies VB, LOF, ctx. +Fm. Denies headaches, vision changes, RUQ pain, increased LE edema. Denies chest pain, shortness of breath, fever, chills, nausea, vomiting. Review of Systems: The following ROS was otherwise negative, except as noted in the HPI: constitutional, HEENT, respiratory, cardiovascular, gastrointestinal, genitourinary, skin, musculoskeletal, neurological, psych    Objective:  /75   Wt 222 lb (100.7 kg)   LMP 09/14/2021   BMI 32.78 kg/m²     Gravid, non tender, no flank pain    FHR: +by doppler    Assessment/Plan:   Ian Emmanuel is a 43 y.o. [id number] at 35w0d who presents for routine OB visit     Diagnosis Orders   1. Supervision of high risk pregnancy in third trimester     2. 35 weeks gestation of pregnancy     3. GDM, class A2  US FETAL BIOPHYSICAL PROFILE WO NON STRESS TESTING     fbs < 95  2hr pp < 120    Fkcs, ptl precautions. Return in about 1 week (around 5/24/2022).       Sania Romeo MD

## 2022-05-23 ENCOUNTER — NURSE ONLY (OUTPATIENT)
Dept: BARIATRICS/WEIGHT MGMT | Age: 37
End: 2022-05-23
Payer: MEDICARE

## 2022-05-23 DIAGNOSIS — O24.410 GDM, CLASS A1: Primary | ICD-10-CM

## 2022-05-23 PROCEDURE — 98960 EDU&TRN PT SELF-MGMT NQHP 1: CPT | Performed by: OBSTETRICS & GYNECOLOGY

## 2022-05-23 NOTE — PROGRESS NOTES
[facility]- Diabetes Education    22      Re:     Libia Gonsalves  :  1985    Dear Dr. Colby Rutherford: Thank you for referring your patient, Libia Gonsalves, for diabetes education. Your patient was seen on 22. Pt presents for gestational diabetes education. Reviewed glucometer process for monitoring including parameters of glucose targets, when to call the OB/GYN office and provided patient with logs to track. She was instructed to bring logs to her follow up appts. We reviewed healthy eating with diabetes and with pregnancy. Pt is encouraged to avoid concentrated sweets, monitor carb intake, eat at consistent times and build a balanced meal of proteins, vegetables, fruits and carbohydrates. Pt was provided handouts for dietary guidelines. The following services were also completed:    [x]  Diet instruction on: carb controlled. [x] Glucometer instruction with return demonstration. Blood glucose was 137.    []  Insulin instruction with return demonstration. Upon completion of these sessions, the diabetes teaching team made the following evaluation of your patient's progress:          ASSESSMENT    [x]  very attentive to teaching  [x]  answered questions appropriately when asked  [x]  seems able to apply concepts to daily lifestyle  [x]  seems motivated to do well  [x]  verbalized an understanding of meal plan  [x]  expresses an intent to comply with meal plan  []  worked out meal timing adjustment according to work/schedule/lifestyle    COMMENTS:      GOAL  [x]  To count carbohydrate servings at each meal as instructed.   [x]  to measure blood glucose   []  to inject insulin in the stomach area  [x]  to log glucose results and bring to physicians office at each scheduled appointment      DIABETES SELF-MANAGEMENT SUPPORT PLAN/REFERRALS:    []  Prescription Assistance  []  Resource Mothers  []  [facility] Program  []  Financial Counselor  []  Social Services  []  Dentist  [x]  Keep all scheduled Dr appointments  []  Smoking Cessation Classes    Thank you for referring this patient to our program.  Please do not hesitate to call if you have any questions at [phone]. Sincerely,    WHIT SweeneyN RN  Diabetes Educator  [address] [place] [address]  [phone] office  [phone] cell  [phone] fax  [email or web address]      [facility]  Ambulatory Diabetes Education Department  [employer] Recognized DSMES Program

## 2022-05-24 ENCOUNTER — ROUTINE PRENATAL (OUTPATIENT)
Dept: OBGYN | Age: 37
End: 2022-05-24
Payer: MEDICARE

## 2022-05-24 VITALS — SYSTOLIC BLOOD PRESSURE: 131 MMHG | BODY MASS INDEX: 33.08 KG/M2 | DIASTOLIC BLOOD PRESSURE: 76 MMHG | WEIGHT: 224 LBS

## 2022-05-24 DIAGNOSIS — O09.93 SUPERVISION OF HIGH RISK PREGNANCY IN THIRD TRIMESTER: Primary | ICD-10-CM

## 2022-05-24 DIAGNOSIS — Z3A.36 36 WEEKS GESTATION OF PREGNANCY: ICD-10-CM

## 2022-05-24 DIAGNOSIS — O09.293 HISTORY OF STILLBIRTH IN CURRENTLY PREGNANT PATIENT, THIRD TRIMESTER: ICD-10-CM

## 2022-05-24 DIAGNOSIS — O24.419 GDM, CLASS A2: ICD-10-CM

## 2022-05-24 PROCEDURE — 99213 OFFICE O/P EST LOW 20 MIN: CPT | Performed by: OBSTETRICS & GYNECOLOGY

## 2022-05-24 PROCEDURE — G8427 DOCREV CUR MEDS BY ELIG CLIN: HCPCS | Performed by: OBSTETRICS & GYNECOLOGY

## 2022-05-24 PROCEDURE — 1036F TOBACCO NON-USER: CPT | Performed by: OBSTETRICS & GYNECOLOGY

## 2022-05-24 PROCEDURE — G8419 CALC BMI OUT NRM PARAM NOF/U: HCPCS | Performed by: OBSTETRICS & GYNECOLOGY

## 2022-05-24 NOTE — PROGRESS NOTES
Return OB Office Visit    CC:   Chief Complaint   Patient presents with    Routine Prenatal Visit     pt c/o pressure. gbs and u/s today. ed       HPI:  Patient seen and examined. No concerns/complaints. Denies VB, LOF, ctx. +Fm. Denies headaches, vision changes, RUQ pain, increased LE edema. Denies chest pain, shortness of breath, fever, chills, nausea, vomiting. Review of Systems: The following ROS was otherwise negative, except as noted in the HPI: constitutional, HEENT, respiratory, cardiovascular, gastrointestinal, genitourinary, skin, musculoskeletal, neurological, psych    Objective:  /76   Wt 224 lb (101.6 kg)   LMP 09/14/2021   BMI 33.08 kg/m²     Gravid, non tender, no flank pain    FHR: +by doppler  fbs <95, rare   2hr pp < 120    Assessment/Plan:   Carmen Conner is a 43 y.o. [id number] at 36w0d who presents for routine OB visit     Diagnosis Orders   1. Supervision of high risk pregnancy in third trimester  Culture, Strep B Screen, Vaginal/Rectal   2. 36 weeks gestation of pregnancy  Culture, Strep B Screen, Vaginal/Rectal   3. History of stillbirth in currently pregnant patient, third trimester     4. GDM, class A2  US FETAL BIOPHYSICAL PROFILE WO NON STRESS TESTING     Fkcs, ptl precautions  Continue twice weekly apft    Return in about 1 week (around 5/31/2022).       Malka Mahoney MD

## 2022-05-26 ENCOUNTER — ROUTINE PRENATAL (OUTPATIENT)
Dept: OBGYN | Age: 37
End: 2022-05-26
Payer: MEDICARE

## 2022-05-26 VITALS — WEIGHT: 224 LBS | SYSTOLIC BLOOD PRESSURE: 131 MMHG | DIASTOLIC BLOOD PRESSURE: 65 MMHG | BODY MASS INDEX: 33.08 KG/M2

## 2022-05-26 DIAGNOSIS — O24.419 GDM, CLASS A2: ICD-10-CM

## 2022-05-26 DIAGNOSIS — Z3A.36 36 WEEKS GESTATION OF PREGNANCY: ICD-10-CM

## 2022-05-26 DIAGNOSIS — O09.293 HISTORY OF STILLBIRTH IN CURRENTLY PREGNANT PATIENT, THIRD TRIMESTER: ICD-10-CM

## 2022-05-26 DIAGNOSIS — O09.93 SUPERVISION OF HIGH RISK PREGNANCY IN THIRD TRIMESTER: Primary | ICD-10-CM

## 2022-05-26 PROCEDURE — 99999 PR OFFICE/OUTPT VISIT,PROCEDURE ONLY: CPT | Performed by: OBSTETRICS & GYNECOLOGY

## 2022-05-26 PROCEDURE — 59025 FETAL NON-STRESS TEST: CPT | Performed by: OBSTETRICS & GYNECOLOGY

## 2022-05-27 LAB
GROUP B STREP CULTURE: ABNORMAL
ORGANISM: ABNORMAL

## 2022-05-31 NOTE — PROGRESS NOTES
5/31/22    Margarita Pope  1985    Chief Complaint   Patient presents with    Non-stress Test     pt here for nst due to GDM. Margarita Pope is a 43 y.o. female who presents today for NST because of diabetes and previous IFUD. The baseline fetal heart rate is 135. It is category I. The variability is moderate. Decelerations are absent. Accelerations are 15 beats/min or greater      Current Outpatient Medications   Medication Sig Dispense Refill    metFORMIN (GLUCOPHAGE XR) 500 MG extended release tablet Take 1 tablet by mouth daily (with breakfast) 30 tablet 3    Prenatal Vit-Fe Sulfate-FA-DHA (PRENATAL VITAMIN/MIN +DHA) 27-0.8-200 MG CAPS Take 1 tablet by mouth daily (may substitute any prenatal vitamin covered by insurance) 90 capsule 3    blood glucose monitor strips Test 4 times a day & as needed for symptoms of irregular blood glucose. Dispense sufficient amount for indicated testing frequency plus additional to accommodate PRN testing needs. 200 strip 3    glucose monitoring (FREESTYLE FREEDOM) kit 1 kit by Does not apply route daily 1 kit 0    Lancets MISC 1 each by Does not apply route 4 times daily 200 each 5    Prenatal MV-Min-FA-Omega-3 (PRENATAL GUMMIES/DHA & FA) 0.4-32.5 MG CHEW Take 1 tablet by mouth daily 30 tablet 11     No current facility-administered medications for this visit. No Known Allergies    [id number]    /65   Wt 224 lb (101.6 kg)   LMP 09/14/2021   BMI 33.08 kg/m²     Physical Exam    ASSESSMENT AND PLAN   Diagnosis Orders   1. Supervision of high risk pregnancy in third trimester     2. 36 weeks gestation of pregnancy     3. History of stillbirth in currently pregnant patient, third trimester     4. GDM, class A2         Return in about 1 week (around 6/2/2022).   Fkcs,,ptl precautions, twice weekly beverley Vail MD

## 2022-06-02 ENCOUNTER — ROUTINE PRENATAL (OUTPATIENT)
Dept: OBGYN | Age: 37
End: 2022-06-02
Payer: MEDICARE

## 2022-06-02 VITALS — WEIGHT: 224 LBS | BODY MASS INDEX: 33.08 KG/M2 | DIASTOLIC BLOOD PRESSURE: 77 MMHG | SYSTOLIC BLOOD PRESSURE: 117 MMHG

## 2022-06-02 DIAGNOSIS — O09.93 SUPERVISION OF HIGH RISK PREGNANCY IN THIRD TRIMESTER: ICD-10-CM

## 2022-06-02 DIAGNOSIS — O09.293 HISTORY OF STILLBIRTH IN CURRENTLY PREGNANT PATIENT, THIRD TRIMESTER: ICD-10-CM

## 2022-06-02 DIAGNOSIS — Z3A.37 37 WEEKS GESTATION OF PREGNANCY: ICD-10-CM

## 2022-06-02 DIAGNOSIS — O24.419 GDM, CLASS A2: Primary | ICD-10-CM

## 2022-06-02 PROCEDURE — 59025 FETAL NON-STRESS TEST: CPT | Performed by: OBSTETRICS & GYNECOLOGY

## 2022-06-02 PROCEDURE — G8419 CALC BMI OUT NRM PARAM NOF/U: HCPCS | Performed by: OBSTETRICS & GYNECOLOGY

## 2022-06-02 PROCEDURE — G8428 CUR MEDS NOT DOCUMENT: HCPCS | Performed by: OBSTETRICS & GYNECOLOGY

## 2022-06-02 PROCEDURE — 99213 OFFICE O/P EST LOW 20 MIN: CPT | Performed by: OBSTETRICS & GYNECOLOGY

## 2022-06-02 PROCEDURE — 1036F TOBACCO NON-USER: CPT | Performed by: OBSTETRICS & GYNECOLOGY

## 2022-06-02 NOTE — PROGRESS NOTES
Return OB Office Visit    CC:   Chief Complaint   Patient presents with    Non-stress Test     Pt here for NST due to chronic HTN       HPI:  Patient seen and examined. No concerns/complaints. Denies VB, LOF, ctx. +Fm. Denies headaches, vision changes, RUQ pain, increased LE edema. Denies chest pain, shortness of breath, fever, chills, nausea, vomiting. Review of Systems: The following ROS was otherwise negative, except as noted in the HPI: constitutional, HEENT, respiratory, cardiovascular, gastrointestinal, genitourinary, skin, musculoskeletal, neurological, psych    Objective:  /77   Wt 224 lb (101.6 kg)   LMP 09/14/2021   BMI 33.08 kg/m²     Gravid, non tender, no flank pain    FHR: +by doppler    Reports normal blood sugars  fbs < 95  2hr pp < 120    Assessment/Plan:   Lexus Barr is a 43 y.o. [id number] at 37w2d who presents for routine OB visit     Diagnosis Orders   1. GDM, class A2     2. Supervision of high risk pregnancy in third trimester     3. History of stillbirth in currently pregnant patient, third trimester     4. 37 weeks gestation of pregnancy     fkcs, labor precuations, continue gdm diet continue glycemic checks      Return in about 1 week (around 6/9/2022). Etta Ortega MD       6/2/22    Gina Carrera  1985    Chief Complaint   Patient presents with    Non-stress Test     Pt here for NST due to chronic HTN        Lexus Barr is a 43 y.o. female who presents today for NST because of diabetes. The baseline fetal heart rate is 130. It is category I. The variability is moderate. Decelerations are absent.   Accelerations are 15 beats/min or greater      Current Outpatient Medications   Medication Sig Dispense Refill    metFORMIN (GLUCOPHAGE XR) 500 MG extended release tablet Take 1 tablet by mouth daily (with breakfast) 30 tablet 3    Prenatal Vit-Fe Sulfate-FA-DHA (PRENATAL VITAMIN/MIN +DHA) 27-0.8-200 MG CAPS Take 1 tablet by mouth daily (may substitute any prenatal vitamin covered by insurance) 90 capsule 3    blood glucose monitor strips Test 4 times a day & as needed for symptoms of irregular blood glucose. Dispense sufficient amount for indicated testing frequency plus additional to accommodate PRN testing needs. 200 strip 3    glucose monitoring (FREESTYLE FREEDOM) kit 1 kit by Does not apply route daily 1 kit 0    Lancets MISC 1 each by Does not apply route 4 times daily 200 each 5    Prenatal MV-Min-FA-Omega-3 (PRENATAL GUMMIES/DHA & FA) 0.4-32.5 MG CHEW Take 1 tablet by mouth daily 30 tablet 11     No current facility-administered medications for this visit. No Known Allergies    [id number]    /77   Wt 224 lb (101.6 kg)   LMP 09/14/2021   BMI 33.08 kg/m²     Physical Exam    ASSESSMENT AND PLAN   Diagnosis Orders   1. GDM, class A2     2. Supervision of high risk pregnancy in third trimester     3. History of stillbirth in currently pregnant patient, third trimester     4. 37 weeks gestation of pregnancy         Return in about 1 week (around 6/9/2022).     Taco Gimenez MD

## 2022-06-07 ENCOUNTER — ROUTINE PRENATAL (OUTPATIENT)
Dept: OBGYN | Age: 37
End: 2022-06-07
Payer: MEDICARE

## 2022-06-07 VITALS — BODY MASS INDEX: 32.93 KG/M2 | SYSTOLIC BLOOD PRESSURE: 123 MMHG | DIASTOLIC BLOOD PRESSURE: 79 MMHG | WEIGHT: 223 LBS

## 2022-06-07 DIAGNOSIS — O09.93 SUPERVISION OF HIGH RISK PREGNANCY IN THIRD TRIMESTER: ICD-10-CM

## 2022-06-07 DIAGNOSIS — Z3A.38 38 WEEKS GESTATION OF PREGNANCY: ICD-10-CM

## 2022-06-07 DIAGNOSIS — O24.419 GDM, CLASS A2: Primary | ICD-10-CM

## 2022-06-07 PROCEDURE — G8419 CALC BMI OUT NRM PARAM NOF/U: HCPCS | Performed by: OBSTETRICS & GYNECOLOGY

## 2022-06-07 PROCEDURE — 1036F TOBACCO NON-USER: CPT | Performed by: OBSTETRICS & GYNECOLOGY

## 2022-06-07 PROCEDURE — G8428 CUR MEDS NOT DOCUMENT: HCPCS | Performed by: OBSTETRICS & GYNECOLOGY

## 2022-06-07 PROCEDURE — 99213 OFFICE O/P EST LOW 20 MIN: CPT | Performed by: OBSTETRICS & GYNECOLOGY

## 2022-06-07 NOTE — PROGRESS NOTES
Return OB Office Visit    CC:   Chief Complaint   Patient presents with    Routine Prenatal Visit     no c/o u/s today, states BS have been normal .       HPI:  Patient seen and examined. No concerns/complaints. Denies VB, LOF, ctx. +Fm. Denies headaches, vision changes, RUQ pain, increased LE edema. Denies chest pain, shortness of breath, fever, chills, nausea, vomiting. Review of Systems: The following ROS was otherwise negative, except as noted in the HPI: constitutional, HEENT, respiratory, cardiovascular, gastrointestinal, genitourinary, skin, musculoskeletal, neurological, psych    Objective:  /79   Wt 223 lb (101.2 kg)   LMP 09/14/2021   BMI 32.93 kg/m²     Gravid, non tender, no flank pain    FHR: +by doppler    Assessment/Plan:   Parisa Busby is a 43 y.o. [id number] at 38w0d who presents for routine OB visit     Diagnosis Orders   1. GDM, class A2     2. Supervision of high risk pregnancy in third trimester     3. 38 weeks gestation of pregnancy       Fkcs, labor precautions, twice weekly apft    Report all sugars normal    Return in about 1 week (around 6/14/2022).       Yazan Gary MD

## 2022-06-09 ENCOUNTER — ROUTINE PRENATAL (OUTPATIENT)
Dept: OBGYN | Age: 37
End: 2022-06-09
Payer: MEDICARE

## 2022-06-09 VITALS — DIASTOLIC BLOOD PRESSURE: 73 MMHG | BODY MASS INDEX: 32.93 KG/M2 | WEIGHT: 223 LBS | SYSTOLIC BLOOD PRESSURE: 138 MMHG

## 2022-06-09 DIAGNOSIS — O09.93 SUPERVISION OF HIGH RISK PREGNANCY IN THIRD TRIMESTER: Primary | ICD-10-CM

## 2022-06-09 DIAGNOSIS — Z3A.38 38 WEEKS GESTATION OF PREGNANCY: ICD-10-CM

## 2022-06-09 DIAGNOSIS — O09.293 HISTORY OF STILLBIRTH IN CURRENTLY PREGNANT PATIENT, THIRD TRIMESTER: ICD-10-CM

## 2022-06-09 DIAGNOSIS — O24.419 GDM, CLASS A2: ICD-10-CM

## 2022-06-09 PROCEDURE — 59025 FETAL NON-STRESS TEST: CPT | Performed by: OBSTETRICS & GYNECOLOGY

## 2022-06-09 PROCEDURE — 99999 PR OFFICE/OUTPT VISIT,PROCEDURE ONLY: CPT | Performed by: OBSTETRICS & GYNECOLOGY

## 2022-06-09 ASSESSMENT — PATIENT HEALTH QUESTIONNAIRE - PHQ9
SUM OF ALL RESPONSES TO PHQ QUESTIONS 1-9: 0
2. FEELING DOWN, DEPRESSED OR HOPELESS: 0
SUM OF ALL RESPONSES TO PHQ QUESTIONS 1-9: 0
1. LITTLE INTEREST OR PLEASURE IN DOING THINGS: 0
SUM OF ALL RESPONSES TO PHQ9 QUESTIONS 1 & 2: 0

## 2022-06-09 NOTE — PROGRESS NOTES
6/9/22    Reid Gonzalez  1985    Chief Complaint   Patient presents with    Non-stress Test     NST due to chronic HTN        Reid Gonzalez is a 43 y.o. female who presents today for NST because of diabetes and previous IFUD. The baseline fetal heart rate is 145. It is category I. The variability is moderate. Decelerations are absent. Accelerations are 15 beats/min or greater      Current Outpatient Medications   Medication Sig Dispense Refill    metFORMIN (GLUCOPHAGE XR) 500 MG extended release tablet Take 1 tablet by mouth daily (with breakfast) 30 tablet 3    Prenatal Vit-Fe Sulfate-FA-DHA (PRENATAL VITAMIN/MIN +DHA) 27-0.8-200 MG CAPS Take 1 tablet by mouth daily (may substitute any prenatal vitamin covered by insurance) 90 capsule 3    blood glucose monitor strips Test 4 times a day & as needed for symptoms of irregular blood glucose. Dispense sufficient amount for indicated testing frequency plus additional to accommodate PRN testing needs. 200 strip 3    glucose monitoring (FREESTYLE FREEDOM) kit 1 kit by Does not apply route daily 1 kit 0    Lancets MISC 1 each by Does not apply route 4 times daily 200 each 5    Prenatal MV-Min-FA-Omega-3 (PRENATAL GUMMIES/DHA & FA) 0.4-32.5 MG CHEW Take 1 tablet by mouth daily 30 tablet 11     No current facility-administered medications for this visit. No Known Allergies    [id number]    /73   Wt 223 lb (101.2 kg)   LMP 09/14/2021   BMI 32.93 kg/m²     Physical Exam    ASSESSMENT AND PLAN   Diagnosis Orders   1. Supervision of high risk pregnancy in third trimester     2. GDM, class A2     3. History of stillbirth in currently pregnant patient, third trimester     4. 38 weeks gestation of pregnancy       Fkcs, labor precautions  No follow-ups on file.     Mariam Watson MD

## 2022-06-13 ENCOUNTER — ROUTINE PRENATAL (OUTPATIENT)
Dept: OBGYN | Age: 37
End: 2022-06-13
Payer: MEDICARE

## 2022-06-13 VITALS — SYSTOLIC BLOOD PRESSURE: 130 MMHG | DIASTOLIC BLOOD PRESSURE: 85 MMHG | WEIGHT: 223 LBS | BODY MASS INDEX: 32.93 KG/M2

## 2022-06-13 DIAGNOSIS — Z3A.38 38 WEEKS GESTATION OF PREGNANCY: ICD-10-CM

## 2022-06-13 DIAGNOSIS — O09.93 SUPERVISION OF HIGH RISK PREGNANCY IN THIRD TRIMESTER: Primary | ICD-10-CM

## 2022-06-13 DIAGNOSIS — O24.419 GDM, CLASS A2: ICD-10-CM

## 2022-06-13 DIAGNOSIS — O09.293 HISTORY OF STILLBIRTH IN CURRENTLY PREGNANT PATIENT, THIRD TRIMESTER: ICD-10-CM

## 2022-06-13 PROCEDURE — 99213 OFFICE O/P EST LOW 20 MIN: CPT | Performed by: OBSTETRICS & GYNECOLOGY

## 2022-06-13 PROCEDURE — G8427 DOCREV CUR MEDS BY ELIG CLIN: HCPCS | Performed by: OBSTETRICS & GYNECOLOGY

## 2022-06-13 PROCEDURE — G8419 CALC BMI OUT NRM PARAM NOF/U: HCPCS | Performed by: OBSTETRICS & GYNECOLOGY

## 2022-06-13 PROCEDURE — 1036F TOBACCO NON-USER: CPT | Performed by: OBSTETRICS & GYNECOLOGY

## 2022-06-13 NOTE — PROGRESS NOTES
Return OB Office Visit    CC:   Chief Complaint   Patient presents with    Routine Prenatal Visit     pt unable to void, no c/o. HPI:  Patient seen and examined. No concerns/complaints. Denies VB, LOF, ctx. +Fm. Denies headaches, vision changes, RUQ pain, increased LE edema. Denies chest pain, shortness of breath, fever, chills, nausea, vomiting. Review of Systems: The following ROS was otherwise negative, except as noted in the HPI: constitutional, HEENT, respiratory, cardiovascular, gastrointestinal, genitourinary, skin, musculoskeletal, neurological, psych    Objective:  /85   Wt 223 lb (101.2 kg)   LMP 09/14/2021   BMI 32.93 kg/m²     Gravid, non tender, no flank pain    FHR: +by doppler  Reports normal   Assessment/Plan:   Josiah Koehler is a 43 y.o. [id number] at 38w6d who presents for routine OB visit     Diagnosis Orders   1. Supervision of high risk pregnancy in third trimester     2. 38 weeks gestation of pregnancy     3. History of stillbirth in currently pregnant patient, third trimester     4. GDM, class A2       Schedule pp check  Return in about 6 weeks (around 7/25/2022).       Pedro Oquendo MD

## 2022-06-15 ENCOUNTER — HOSPITAL ENCOUNTER (INPATIENT)
Age: 37
LOS: 1 days | Discharge: HOME | DRG: 560 | End: 2022-06-17
Attending: OBSTETRICS & GYNECOLOGY | Admitting: OBSTETRICS & GYNECOLOGY
Payer: MEDICARE

## 2022-06-15 ENCOUNTER — ANESTHESIA (OUTPATIENT)
Dept: LABOR AND DELIVERY | Age: 37
DRG: 560 | End: 2022-06-15
Payer: MEDICARE

## 2022-06-15 ENCOUNTER — ANESTHESIA EVENT (OUTPATIENT)
Dept: LABOR AND DELIVERY | Age: 37
DRG: 560 | End: 2022-06-15
Payer: MEDICARE

## 2022-06-15 LAB
ABO/RH: NORMAL
AMPHETAMINES: NEGATIVE
ANTIBODY SCREEN: NEGATIVE
BARBITURATE SCREEN URINE: NEGATIVE
BENZODIAZEPINE SCREEN, URINE: NEGATIVE
CANNABINOID SCREEN URINE: NEGATIVE
COCAINE METABOLITE: NEGATIVE
GLUCOSE BLD-MCNC: 112 MG/DL (ref 70–99)
GLUCOSE BLD-MCNC: 76 MG/DL (ref 70–99)
GLUCOSE BLD-MCNC: 82 MG/DL (ref 70–99)
GLUCOSE BLD-MCNC: 82 MG/DL (ref 70–99)
GLUCOSE BLD-MCNC: 88 MG/DL (ref 70–99)
GLUCOSE BLD-MCNC: 89 MG/DL (ref 70–99)
GLUCOSE BLD-MCNC: 93 MG/DL (ref 70–99)
GLUCOSE BLD-MCNC: 95 MG/DL (ref 70–99)
HCT VFR BLD CALC: 33.4 % (ref 37–47)
HEMOGLOBIN: 10.5 GM/DL (ref 12.5–16)
MCH RBC QN AUTO: 25.6 PG (ref 27–31)
MCHC RBC AUTO-ENTMCNC: 31.4 % (ref 32–36)
MCV RBC AUTO: 81.5 FL (ref 78–100)
OPIATES, URINE: NEGATIVE
OXYCODONE: NEGATIVE
PDW BLD-RTO: 16.2 % (ref 11.7–14.9)
PHENCYCLIDINE, URINE: NEGATIVE
PLATELET # BLD: 122 K/CU MM (ref 140–440)
PMV BLD AUTO: 10.1 FL (ref 7.5–11.1)
RBC # BLD: 4.1 M/CU MM (ref 4.2–5.4)
WBC # BLD: 6.3 K/CU MM (ref 4–10.5)

## 2022-06-15 PROCEDURE — 3700000025 EPIDURAL BLOCK: Performed by: ANESTHESIOLOGY

## 2022-06-15 PROCEDURE — 2580000003 HC RX 258: Performed by: OBSTETRICS & GYNECOLOGY

## 2022-06-15 PROCEDURE — 6360000002 HC RX W HCPCS: Performed by: OBSTETRICS & GYNECOLOGY

## 2022-06-15 PROCEDURE — 6360000002 HC RX W HCPCS: Performed by: NURSE ANESTHETIST, CERTIFIED REGISTERED

## 2022-06-15 PROCEDURE — 86901 BLOOD TYPING SEROLOGIC RH(D): CPT

## 2022-06-15 PROCEDURE — 1220000000 HC SEMI PRIVATE OB R&B

## 2022-06-15 PROCEDURE — 82962 GLUCOSE BLOOD TEST: CPT

## 2022-06-15 PROCEDURE — 86900 BLOOD TYPING SEROLOGIC ABO: CPT

## 2022-06-15 PROCEDURE — 80307 DRUG TEST PRSMV CHEM ANLYZR: CPT

## 2022-06-15 PROCEDURE — 85027 COMPLETE CBC AUTOMATED: CPT

## 2022-06-15 PROCEDURE — 86850 RBC ANTIBODY SCREEN: CPT

## 2022-06-15 RX ORDER — SODIUM CHLORIDE 0.9 % (FLUSH) 0.9 %
5-40 SYRINGE (ML) INJECTION PRN
Status: DISCONTINUED | OUTPATIENT
Start: 2022-06-15 | End: 2022-06-16

## 2022-06-15 RX ORDER — LIDOCAINE HYDROCHLORIDE 10 MG/ML
30 INJECTION, SOLUTION EPIDURAL; INFILTRATION; INTRACAUDAL; PERINEURAL PRN
Status: DISCONTINUED | OUTPATIENT
Start: 2022-06-15 | End: 2022-06-16

## 2022-06-15 RX ORDER — NALOXONE HYDROCHLORIDE 0.4 MG/ML
INJECTION, SOLUTION INTRAMUSCULAR; INTRAVENOUS; SUBCUTANEOUS PRN
Status: DISCONTINUED | OUTPATIENT
Start: 2022-06-15 | End: 2022-06-16

## 2022-06-15 RX ORDER — METHYLERGONOVINE MALEATE 0.2 MG/ML
200 INJECTION INTRAVENOUS
Status: DISCONTINUED | OUTPATIENT
Start: 2022-06-15 | End: 2022-06-15

## 2022-06-15 RX ORDER — DOCUSATE SODIUM 100 MG/1
100 CAPSULE, LIQUID FILLED ORAL 2 TIMES DAILY
Status: DISCONTINUED | OUTPATIENT
Start: 2022-06-15 | End: 2022-06-16

## 2022-06-15 RX ORDER — SODIUM CHLORIDE 9 MG/ML
25 INJECTION, SOLUTION INTRAVENOUS PRN
Status: DISCONTINUED | OUTPATIENT
Start: 2022-06-15 | End: 2022-06-16

## 2022-06-15 RX ORDER — ROPIVACAINE HYDROCHLORIDE 2 MG/ML
INJECTION, SOLUTION EPIDURAL; INFILTRATION; PERINEURAL CONTINUOUS PRN
Status: DISCONTINUED | OUTPATIENT
Start: 2022-06-15 | End: 2022-06-16 | Stop reason: SDUPTHER

## 2022-06-15 RX ORDER — FENTANYL CITRATE 50 UG/ML
100 INJECTION, SOLUTION INTRAMUSCULAR; INTRAVENOUS
Status: DISCONTINUED | OUTPATIENT
Start: 2022-06-15 | End: 2022-06-16

## 2022-06-15 RX ORDER — MISOPROSTOL 200 UG/1
1000 TABLET ORAL
Status: DISCONTINUED | OUTPATIENT
Start: 2022-06-16 | End: 2022-06-16

## 2022-06-15 RX ORDER — ONDANSETRON 2 MG/ML
4 INJECTION INTRAMUSCULAR; INTRAVENOUS EVERY 6 HOURS PRN
Status: DISCONTINUED | OUTPATIENT
Start: 2022-06-15 | End: 2022-06-16

## 2022-06-15 RX ORDER — FAMOTIDINE 10 MG/ML
20 INJECTION, SOLUTION INTRAVENOUS 2 TIMES DAILY PRN
Status: DISCONTINUED | OUTPATIENT
Start: 2022-06-15 | End: 2022-06-16

## 2022-06-15 RX ORDER — MISOPROSTOL 200 UG/1
1000 TABLET ORAL
Status: DISCONTINUED | OUTPATIENT
Start: 2022-06-15 | End: 2022-06-15

## 2022-06-15 RX ORDER — METHYLERGONOVINE MALEATE 0.2 MG/ML
200 INJECTION INTRAVENOUS
Status: DISCONTINUED | OUTPATIENT
Start: 2022-06-16 | End: 2022-06-16

## 2022-06-15 RX ORDER — SODIUM CHLORIDE, SODIUM LACTATE, POTASSIUM CHLORIDE, CALCIUM CHLORIDE 600; 310; 30; 20 MG/100ML; MG/100ML; MG/100ML; MG/100ML
INJECTION, SOLUTION INTRAVENOUS CONTINUOUS
Status: DISCONTINUED | OUTPATIENT
Start: 2022-06-15 | End: 2022-06-16

## 2022-06-15 RX ADMIN — AMPICILLIN SODIUM 1000 MG: 1 INJECTION, POWDER, FOR SOLUTION INTRAMUSCULAR; INTRAVENOUS at 21:31

## 2022-06-15 RX ADMIN — SODIUM CHLORIDE, POTASSIUM CHLORIDE, SODIUM LACTATE AND CALCIUM CHLORIDE: 600; 310; 30; 20 INJECTION, SOLUTION INTRAVENOUS at 14:12

## 2022-06-15 RX ADMIN — ROPIVACAINE HYDROCHLORIDE 10 ML/HR: 2 INJECTION, SOLUTION EPIDURAL; INFILTRATION at 22:19

## 2022-06-15 RX ADMIN — AMPICILLIN SODIUM 1000 MG: 1 INJECTION, POWDER, FOR SOLUTION INTRAMUSCULAR; INTRAVENOUS at 18:24

## 2022-06-15 RX ADMIN — AMPICILLIN SODIUM 1000 MG: 1 INJECTION, POWDER, FOR SOLUTION INTRAMUSCULAR; INTRAVENOUS at 14:11

## 2022-06-15 RX ADMIN — Medication 1 MILLI-UNITS/MIN: at 09:34

## 2022-06-15 RX ADMIN — SODIUM CHLORIDE, POTASSIUM CHLORIDE, SODIUM LACTATE AND CALCIUM CHLORIDE: 600; 310; 30; 20 INJECTION, SOLUTION INTRAVENOUS at 11:25

## 2022-06-15 NOTE — PROGRESS NOTES
Patient arrived ambulatory to Labor & Delivery for scheduled induction of labor. Patient shown to room and instructed to place on gown and obtain urine specimen. Patient oriented to room and call light. EFM & toco applied, +FHR. Abdomen soft and non tender to palpation. Patient denies headache, epigastric pain, abdominal pain and contractions. Patient reports feeling her baby move well. Denies vaginal bleeding or leaking of fluid. Discussed safe sleep and infant/patient safety and fall prevention information. Patient voiced understanding and forms completed. Fingerprints obtained. Plan of care for induction of labor discussed with patient in length. Discussed patient's birth plan for her labor, as well as pharmacological and non pharmacological interventions for pain relief during labor. Patient voiced understanding.

## 2022-06-15 NOTE — PROGRESS NOTES
Department of Obstetrics and Gynecology  Labor and Delivery   Midwifery Progress Note      SUBJECTIVE:  Pt coping well with Contractions    OBJECTIVE:      Fetal heart rate:       Baseline Heart Rate:  125        Accelerations:  present       Variability:  moderate       Decelerations:  absent         Contraction frequency: 2-3 minutes    Membranes:  Ruptured clear fluid    Cervix:  Unchanged2           ASSESSMENT     Pt coping well with contractions   FHT- reactive   Pitocin 8mu/min     PLAN:  Will continue with POC   Anticipate Active labor     I have collaborated and updated Dr Keren Odonnell and the MD agrees with the current POC.      CHELSEA Bowman CNM

## 2022-06-15 NOTE — PROGRESS NOTES
Department of Obstetrics and Gynecology  Labor and Delivery   Midwifery Progress Note      SUBJECTIVE:  Coping well with Contractions     OBJECTIVE:      Fetal heart rate:       Baseline Heart Rate:  135        Accelerations:  present       Variability:  moderate       Decelerations:  absent         Contraction frequency: 2-5 minutes    Membranes:  Ruptured clear fluid    Cervix:         Dilation:  1 cm         Effacement:  50%         Station:  -2         Position:  mid             ASSESSMENT   Coping with Contractions   AROM- for clear fluid Per Dr Stone Harper ([id number])  FHT- reactive   Pitocin- 8mu/min    PLAN:  Continue With POC   Anticipate Active labor     I have collaborated and updated Dr Stone Harper  and the MD agrees with the current POC.      CHELSEA Villagomez CNM

## 2022-06-15 NOTE — PLAN OF CARE
Problem: Vaginal Birth or  Section  Goal: Fetal and maternal status remain reassuring during the birth process  Description:  Birth OB-Pregnancy care plan goal which identifies if the fetal and maternal status remain reassuring during the birth process  Outcome: Progressing  Flowsheets (Taken 6/15/2022 0845)  Fetal and Maternal Status Remain Reassuring During the Birth Process:   Monitor vital signs   Monitor fetal heart rate   Monitor uterine activity   Monitor labor progression (Vaginal delivery)   Deep vein thrombosis prophylaxis ( delivery)   Surgical antibiotic prophylaxis ( delivery)     Problem: Postpartum  Goal: Experiences normal postpartum course  Description:  Postpartum OB-Pregnancy care plan goal which identifies if the mother is experiencing a normal postpartum course  Outcome: Progressing  Goal: Appropriate maternal -  bonding  Description:  Postpartum OB-Pregnancy care plan goal which identifies if the mother and  are bonding appropriately  Outcome: Progressing  Goal: Establishment of infant feeding pattern  Description:  Postpartum OB-Pregnancy care plan goal which identifies if the mother is establishing a feeding pattern with their   Outcome: Progressing  Goal: Incisions, wounds, or drain sites healing without S/S of infection  Outcome: Progressing     Problem: Pain  Goal: Verbalizes/displays adequate comfort level or baseline comfort level  Outcome: Progressing     Problem: Infection - Adult  Goal: Absence of infection at discharge  Outcome: Progressing  Goal: Absence of infection during hospitalization  Outcome: Progressing  Goal: Absence of fever/infection during anticipated neutropenic period  Outcome: Progressing     Problem: Safety - Adult  Goal: Free from fall injury  Outcome: Progressing     Problem: Discharge Planning  Goal: Discharge to home or other facility with appropriate resources  Outcome: Progressing  Flowsheets (Taken 6/15/2022 3312)  Discharge to home or other facility with appropriate resources:   Identify barriers to discharge with patient and caregiver   Identify discharge learning needs (meds, wound care, etc)   Refer to discharge planning if patient needs post-hospital services based on physician order or complex needs related to functional status, cognitive ability or social support system   Arrange for needed discharge resources and transportation as appropriate   Arrange for interpreters to assist at discharge as needed

## 2022-06-16 PROCEDURE — 6370000000 HC RX 637 (ALT 250 FOR IP): Performed by: OBSTETRICS & GYNECOLOGY

## 2022-06-16 PROCEDURE — 6360000002 HC RX W HCPCS: Performed by: OBSTETRICS & GYNECOLOGY

## 2022-06-16 PROCEDURE — 3E033VJ INTRODUCTION OF OTHER HORMONE INTO PERIPHERAL VEIN, PERCUTANEOUS APPROACH: ICD-10-PCS | Performed by: OBSTETRICS & GYNECOLOGY

## 2022-06-16 PROCEDURE — 7200000001 HC VAGINAL DELIVERY

## 2022-06-16 PROCEDURE — 51702 INSERT TEMP BLADDER CATH: CPT

## 2022-06-16 PROCEDURE — 10907ZC DRAINAGE OF AMNIOTIC FLUID, THERAPEUTIC FROM PRODUCTS OF CONCEPTION, VIA NATURAL OR ARTIFICIAL OPENING: ICD-10-PCS | Performed by: OBSTETRICS & GYNECOLOGY

## 2022-06-16 PROCEDURE — 59409 OBSTETRICAL CARE: CPT | Performed by: OBSTETRICS & GYNECOLOGY

## 2022-06-16 RX ORDER — DOCUSATE SODIUM 100 MG/1
100 CAPSULE, LIQUID FILLED ORAL 2 TIMES DAILY
Status: DISCONTINUED | OUTPATIENT
Start: 2022-06-16 | End: 2022-06-17 | Stop reason: HOSPADM

## 2022-06-16 RX ORDER — ONDANSETRON 4 MG/1
4 TABLET, ORALLY DISINTEGRATING ORAL EVERY 6 HOURS PRN
Status: DISCONTINUED | OUTPATIENT
Start: 2022-06-16 | End: 2022-06-17 | Stop reason: HOSPADM

## 2022-06-16 RX ORDER — LANOLIN 100 %
OINTMENT (GRAM) TOPICAL PRN
Status: DISCONTINUED | OUTPATIENT
Start: 2022-06-16 | End: 2022-06-17 | Stop reason: HOSPADM

## 2022-06-16 RX ORDER — OXYCODONE HYDROCHLORIDE 5 MG/1
5 TABLET ORAL EVERY 4 HOURS PRN
Status: DISCONTINUED | OUTPATIENT
Start: 2022-06-16 | End: 2022-06-17 | Stop reason: HOSPADM

## 2022-06-16 RX ORDER — CARBOPROST TROMETHAMINE 250 UG/ML
250 INJECTION, SOLUTION INTRAMUSCULAR PRN
Status: DISCONTINUED | OUTPATIENT
Start: 2022-06-16 | End: 2022-06-17 | Stop reason: HOSPADM

## 2022-06-16 RX ORDER — ACETAMINOPHEN 500 MG
1000 TABLET ORAL EVERY 8 HOURS
Status: DISCONTINUED | OUTPATIENT
Start: 2022-06-16 | End: 2022-06-17 | Stop reason: HOSPADM

## 2022-06-16 RX ORDER — SODIUM CHLORIDE 0.9 % (FLUSH) 0.9 %
5-40 SYRINGE (ML) INJECTION PRN
Status: DISCONTINUED | OUTPATIENT
Start: 2022-06-16 | End: 2022-06-17 | Stop reason: HOSPADM

## 2022-06-16 RX ORDER — OXYCODONE HYDROCHLORIDE 5 MG/1
10 TABLET ORAL EVERY 4 HOURS PRN
Status: DISCONTINUED | OUTPATIENT
Start: 2022-06-16 | End: 2022-06-17 | Stop reason: HOSPADM

## 2022-06-16 RX ORDER — FAMOTIDINE 20 MG/1
20 TABLET, FILM COATED ORAL 2 TIMES DAILY PRN
Status: DISCONTINUED | OUTPATIENT
Start: 2022-06-16 | End: 2022-06-17 | Stop reason: HOSPADM

## 2022-06-16 RX ORDER — SODIUM CHLORIDE 0.9 % (FLUSH) 0.9 %
5-40 SYRINGE (ML) INJECTION EVERY 12 HOURS SCHEDULED
Status: DISCONTINUED | OUTPATIENT
Start: 2022-06-16 | End: 2022-06-17 | Stop reason: HOSPADM

## 2022-06-16 RX ORDER — METHYLERGONOVINE MALEATE 0.2 MG/ML
200 INJECTION INTRAVENOUS PRN
Status: DISCONTINUED | OUTPATIENT
Start: 2022-06-16 | End: 2022-06-17 | Stop reason: HOSPADM

## 2022-06-16 RX ORDER — MISOPROSTOL 200 UG/1
800 TABLET ORAL PRN
Status: DISCONTINUED | OUTPATIENT
Start: 2022-06-16 | End: 2022-06-17 | Stop reason: HOSPADM

## 2022-06-16 RX ORDER — NICOTINE 21 MG/24HR
1 PATCH, TRANSDERMAL 24 HOURS TRANSDERMAL DAILY
Status: DISCONTINUED | OUTPATIENT
Start: 2022-06-16 | End: 2022-06-17 | Stop reason: HOSPADM

## 2022-06-16 RX ORDER — SIMETHICONE 80 MG
80 TABLET,CHEWABLE ORAL EVERY 6 HOURS PRN
Status: DISCONTINUED | OUTPATIENT
Start: 2022-06-16 | End: 2022-06-17 | Stop reason: HOSPADM

## 2022-06-16 RX ORDER — SODIUM CHLORIDE 9 MG/ML
INJECTION, SOLUTION INTRAVENOUS PRN
Status: DISCONTINUED | OUTPATIENT
Start: 2022-06-16 | End: 2022-06-17 | Stop reason: HOSPADM

## 2022-06-16 RX ORDER — IBUPROFEN 800 MG/1
800 TABLET ORAL EVERY 8 HOURS
Status: DISCONTINUED | OUTPATIENT
Start: 2022-06-16 | End: 2022-06-17 | Stop reason: HOSPADM

## 2022-06-16 RX ADMIN — DOCUSATE SODIUM 100 MG: 100 CAPSULE, LIQUID FILLED ORAL at 09:55

## 2022-06-16 RX ADMIN — IBUPROFEN 800 MG: 800 TABLET, FILM COATED ORAL at 01:15

## 2022-06-16 RX ADMIN — METHYLERGONOVINE MALEATE 200 MCG: 0.2 INJECTION, SOLUTION INTRAMUSCULAR; INTRAVENOUS at 01:38

## 2022-06-16 ASSESSMENT — PAIN SCALES - GENERAL
PAINLEVEL_OUTOF10: 0

## 2022-06-16 NOTE — ANESTHESIA POSTPROCEDURE EVALUATION
Department of Anesthesiology  Postprocedure Note    Patient: Army Wolf  MRN: [id number]  YOB: 1985  Date of evaluation: 6/16/2022  Time:  12:28 AM     Procedure Summary     Date: 06/15/22 Room / Location:     Anesthesia Start: 4626 Anesthesia Stop:     Procedure: Labor Analgesia Diagnosis: Pregnancy, abdominal, with intrauterine pregnancy    Scheduled Providers:  Responsible Provider: Sobeida Gallo DO    Anesthesia Type: epidural ASA Status: 2          Anesthesia Type: No value filed. Veronica Phase I:      Veronica Phase II:      Last vitals: Reviewed and per EMR flowsheets.        Anesthesia Post Evaluation    Patient location during evaluation: bedside  Patient participation: complete - patient participated  Level of consciousness: awake and alert  Pain score: 2  Airway patency: patent  Nausea & Vomiting: no nausea  Complications: no  Cardiovascular status: blood pressure returned to baseline  Respiratory status: acceptable  Hydration status: euvolemic

## 2022-06-16 NOTE — L&D DELIVERY NOTE
Department of Obstetrics and Gynecology  Spontaneous Vaginal Delivery Note    Labor & Delivery Summary  Labor Onset Date: 06/15/22  Labor Onset Time: 1255  Dilation Complete Date: 06/15/22  Dilation Complete Time: 2358    Pre-operative Diagnosis:  Term pregnancy, gestational diabetes type A2, elderly multigravida with the age of 25    Post-operative Diagnosis:  Same + live male    Procedure:  Spontaneous vaginal delivery    Surgeon:  Sania Romeo MD    Information for the patient's :  Kavitha Lopez Pending [facility] [[id number]]          Anesthesia:  epidural anesthesia    Estimated blood loss:  300ml    Specimen:  Placenta not sent to pathology     Cord blood sent No    Complications:  none    Condition:  infant stable to general nursery    Details of Procedure: The patient is a 43 y.o. female at 44w2d   OB History        6    Para   4    Term   3       1    AB   1    Living   3       SAB   1    IAB        Ectopic        Molar        Multiple        Live Births   4             who was admitted for induction. She received the following interventions: ARBOW and IV Pitocin induction. The patient progressed well,did receive an epidural, became complete and started to push. After pushing for approximately 20  miutes  , the fetal head was at the perineum, nose and mouth suctioned with bulb suction and the rest of the infant delivered atraumatically. Cord was clamped and cut and infant was placed on mother abdomen and then to the waiting nurse for evaluation. The delivery of the placenta was spontaneous and intact. The perineum and vagina were explored and a No laceration was identified.

## 2022-06-16 NOTE — ANESTHESIA PROCEDURE NOTES
Epidural Block    Patient location during procedure: OB  Start time: 6/15/2022 10:00 PM  End time: 6/15/2022 10:19 PM  Reason for block: labor epidural  Staffing  Performed: resident/CRNA   Resident/CRNA: CHELSEA Betancourt - CRNA  Epidural  Patient position: sitting  Prep: ChloraPrep  Patient monitoring: continuous pulse ox and frequent blood pressure checks  Approach: midline  Location: L4-5  Injection technique: ALYSA saline  Provider prep: sterile gloves and mask  Needle  Needle type: Tuohy   Needle gauge: 17 G  Needle length: 3.5 in  Needle insertion depth: 8 cm  Catheter type: multi-orifice  Catheter size: 19 G  Catheter at skin depth: 14 cm  Test dose: negativeCatheter Secured: tegaderm and tape  Assessment  Sensory level: T10  Hemodynamics: unstable  Attempts: 1  Outcomes: patient tolerated procedure well  Preanesthetic Checklist  Completed: patient identified, IV checked, site marked, risks and benefits discussed, surgical/procedural consents, equipment checked, pre-op evaluation, timeout performed, anesthesia consent given, oxygen available, monitors applied/VS acknowledged, fire risk safety assessment completed and verbalized and blood product R/B/A discussed and consented

## 2022-06-16 NOTE — ANESTHESIA PRE PROCEDURE
Department of Anesthesiology  Preprocedure Note       Name:  Kathe Santos   Age:  43 y.o.  :  1985                                          MRN:  [id number]         Date:  6/15/2022      Surgeon: * No surgeons listed *    Procedure: * No procedures listed *    Medications prior to admission:   Prior to Admission medications    Medication Sig Start Date End Date Taking? Authorizing Provider   metFORMIN (GLUCOPHAGE XR) 500 MG extended release tablet Take 1 tablet by mouth daily (with breakfast) 5/10/22   Blake Lowry MD   Prenatal Vit-Fe Sulfate-FA-DHA (PRENATAL VITAMIN/MIN +DHA) 27-0.8-200 MG CAPS Take 1 tablet by mouth daily (may substitute any prenatal vitamin covered by insurance) 5/10/22   Angel Valadez MD   blood glucose monitor strips Test 4 times a day & as needed for symptoms of irregular blood glucose. Dispense sufficient amount for indicated testing frequency plus additional to accommodate PRN testing needs.  4/15/22   Blake Lowry MD   glucose monitoring (FREESTYLE FREEDOM) kit 1 kit by Does not apply route daily 4/15/22   Angel Valadez MD   Lancets MISC 1 each by Does not apply route 4 times daily 4/15/22   Angel Valadez MD   Prenatal MV-Min-FA-Omega-3 (PRENATAL GUMMIES/DHA & FA) 0.4-32.5 MG CHEW Take 1 tablet by mouth daily 21   Angel Valadez MD       Current medications:    Current Facility-Administered Medications   Medication Dose Route Frequency Provider Last Rate Last Admin    lactated ringers infusion   IntraVENous Continuous Angel Valadez  mL/hr at 06/15/22 1801 Rate Verify at 06/15/22 1801    sodium chloride flush 0.9 % injection 5-40 mL  5-40 mL IntraVENous PRN Angel Valadez MD        0.9 % sodium chloride infusion  25 mL IntraVENous PRN Angel Valadez MD        oxytocin (PITOCIN) 30 units in 500 mL infusion  1-20 ashish-units/min IntraVENous Continuous [place]ico [place] Tushar Glynn MD 12 mL/hr at 06/15/22 2040 12 ashish-units/min at 06/15/22 2040    oxytocin (PITOCIN) 30 units in 500 mL infusion  87.3 ashish-units/min IntraVENous Continuous PRN Boo Mcbride MD        And    oxytocin (PITOCIN) 30 units in 500 mL infusion  10 Units IntraVENous PRN Boo Mcbride MD 10 mL/hr at 06/15/22 1923 Rate Change at 06/15/22 1923    lidocaine PF 1 % injection 30 mL  30 mL Other PRN Boo Mcbride MD        fentaNYL (SUBLIMAZE) injection 100 mcg  100 mcg IntraVENous Q1H PRN Boo Mcbride MD        famotidine (PEPCID) injection 20 mg  20 mg IntraVENous BID PRN Boo Mcbride MD        [facility]ron [facility] 4 mg  4 mg IntraVENous Q6H PRN Boo Mcbride MD        docusate sodium (COLACE) capsule 100 mg  100 mg Oral BID Boo Mcbride MD        ampicillin 1000 mg ivpb mini bag  1,000 mg IntraVENous Q4H Boo Mcbride  mL/hr at 06/15/22 2131 1,000 mg at 06/15/22 2131       Allergies:  No Known Allergies    Problem List:    Patient Active Problem List   Diagnosis Code    Acute appendicitis K35.80    Dehiscence of surgical wound T81. 31XA    Chronic hypertension complicating or reason for care during pregnancy, second trimester O10.912     death in prior pregnancy, currently pregnant, second trimester O200.18    Labor and delivery indication for care or intervention O75.9    Obesity affecting pregnancy in third trimester, antepartum O99.213    History of stillbirth in currently pregnant patient, third trimester O0.36    GDM, class A1 O24.410    GDM, class A2 O24.419       Past Medical History:        Diagnosis Date    Acute appendicitis 2012    Chemical pregnancy     Irregular menses     Kidney stones     Obesity     Pregnant     Prior pregnancy with fetal demise and current pregnancy 2007    36Wks       Past Surgical History:        Procedure Laterality Date    APPENDECTOMY  CHOLECYSTECTOMY      LAPAROSCOPIC APPENDECTOMY  04/10/12    OTHER SURGICAL HISTORY  2010    lump removed from right axilla    OTHER SURGICAL HISTORY  2010    lump removed from groin area    OTHER SURGICAL HISTORY Right 2013    Excision of lesion to R axilla    OTHER SURGICAL HISTORY Right 2013    Closure right axillary wound       Social History:    Social History     Tobacco Use    Smoking status: Former Smoker     Packs/day: 1.00     Years: 15.00     Pack years: 15.00     Types: Cigarettes     Quit date: 2017     Years since quittin.5    Smokeless tobacco: Never Used    Tobacco comment: quit when she found out she was pregnant   Substance Use Topics    Alcohol use:  No                                Counseling given: Not Answered  Comment: quit when she found out she was pregnant      Vital Signs (Current):   Vitals:    06/15/22 1642 06/15/22 1730 06/15/22 1828 06/15/22 2030   BP: 124/78 (!) 140/92 (!) 141/88 129/73   Pulse: 83 90 91 87   Resp:    Temp: 36.7 °C (98.1 °F) 36.7 °C (98.1 °F) 36.8 °C (98.2 °F) 36.7 °C (98.1 °F)   TempSrc: Oral Oral Oral Oral   SpO2: 100% 100% 99% 99%                                              BP Readings from Last 3 Encounters:   06/15/22 129/73   22 130/85   22 138/73       NPO Status:                                                                                 BMI:   Wt Readings from Last 3 Encounters:   22 223 lb (101.2 kg)   22 223 lb (101.2 kg)   22 223 lb (101.2 kg)     There is no height or weight on file to calculate BMI.    CBC:   Lab Results   Component Value Date    WBC 6.3 06/15/2022    RBC 4.10 06/15/2022    HGB 10.5 06/15/2022    HCT 33.4 06/15/2022    MCV 81.5 06/15/2022    RDW 16.2 06/15/2022     06/15/2022       CMP:   Lab Results   Component Value Date     2022    K 3.5 2022     2022    CO2 22 2022    BUN 6 2022    CREATININE 0.5 04/01/2022    GFRAA >60 04/01/2022    LABGLOM >60 04/01/2022    GLUCOSE 120 04/01/2022    PROT 6.2 04/01/2022    CALCIUM 9.1 04/01/2022    BILITOT 0.1 04/01/2022    ALKPHOS 64 04/01/2022    AST 11 04/01/2022    ALT 12 04/01/2022       POC Tests:   Recent Labs     06/15/22  2138   POCGLU 89       Coags: No results found for: PROTIME, INR, APTT    HCG (If Applicable):   Lab Results   Component Value Date    PREGTESTUR positive 11/11/2021        ABGs: No results found for: PHART, PO2ART, RZX9VMG, HKJ5TII, BEART, W6IACHWO     Type & Screen (If Applicable):  No results found for: LABABO, LABRH    Drug/Infectious Status (If Applicable):  No results found for: HIV, HEPCAB    COVID-19 Screening (If Applicable): No results found for: COVID19        Anesthesia Evaluation    Airway: Mallampati: II  TM distance: >3 FB   Neck ROM: full  Mouth opening: > = 3 FB   Dental: normal exam         Pulmonary:Negative Pulmonary ROS and normal exam                               Cardiovascular:                      Neuro/Psych:   Negative Neuro/Psych ROS              GI/Hepatic/Renal: Neg GI/Hepatic/Renal ROS            Endo/Other:                     Abdominal:             Vascular: negative vascular ROS. Other Findings:           Anesthesia Plan      epidural     ASA 2             Anesthetic plan and risks discussed with patient. Use of blood products discussed with patient whom consented to blood products. Plan discussed with CRNA and attending.     Attending anesthesiologist reviewed and agrees with Preprocedure content      Post-op pain plan if not by surgeon: continuous epidural            CHELSEA Winchester CRNA   6/15/2022

## 2022-06-16 NOTE — FLOWSHEET NOTE
AM assessment complete. Bilateral breath sounds clear on auscultation. Abdomen soft, fundus firm with little rubra. Discussed plan of care encouraged mother & father to fill out birth certificate and affidavit. Ambulating well. Side rails up x 2. Supportive father of baby at bedside.

## 2022-06-16 NOTE — PROGRESS NOTES
RN remained at bedside throughout pushing. EFM continuously assessed.  Viable baby boy born @ 1 via  Apgars 8 & 9

## 2022-06-16 NOTE — PROGRESS NOTES
TR to ,updated on pt recent SVE status, EFM tracing and interventions completed, notified of history excess bleeding, new orders placed at this time.

## 2022-06-16 NOTE — PROGRESS NOTES
Pt up to bathroom via Sanjay, educated on use of peribottle and tucks pads,expressed understanding large amount of urine voided, transferred to [facility] via with no s/s distress noted, report given to oncoming RN, pt updated on POC, expressed understanding, FOB and baby in bassinette at bedside, no further needs voiced, call light in reach

## 2022-06-16 NOTE — LACTATION NOTE
Visited. Mom says baby breast fed well following delivery and has been sleepy after. Support given. I encourage Mom to continue direct breast feeding . Mom says she may just pump and feed EBM. I offer to assist as she desires.  Ghada Chapa

## 2022-06-17 VITALS
WEIGHT: 230 LBS | SYSTOLIC BLOOD PRESSURE: 119 MMHG | OXYGEN SATURATION: 100 % | DIASTOLIC BLOOD PRESSURE: 64 MMHG | RESPIRATION RATE: 18 BRPM | HEIGHT: 69 IN | HEART RATE: 74 BPM | BODY MASS INDEX: 34.07 KG/M2 | TEMPERATURE: 98.3 F

## 2022-06-17 PROBLEM — Z34.90 ENCOUNTER FOR ELECTIVE INDUCTION OF LABOR: Status: ACTIVE | Noted: 2022-06-17

## 2022-06-17 PROCEDURE — 1220000000 HC SEMI PRIVATE OB R&B

## 2022-06-17 PROCEDURE — 6370000000 HC RX 637 (ALT 250 FOR IP): Performed by: OBSTETRICS & GYNECOLOGY

## 2022-06-17 PROCEDURE — 99238 HOSP IP/OBS DSCHRG MGMT 30/<: CPT

## 2022-06-17 RX ORDER — DOCUSATE SODIUM 100 MG/1
100 CAPSULE, LIQUID FILLED ORAL 2 TIMES DAILY
Qty: 30 CAPSULE | Refills: 1 | Status: SHIPPED | OUTPATIENT
Start: 2022-06-17 | End: 2022-08-02 | Stop reason: ALTCHOICE

## 2022-06-17 RX ORDER — IBUPROFEN 800 MG/1
800 TABLET ORAL EVERY 8 HOURS
Qty: 120 TABLET | Refills: 3 | Status: SHIPPED | OUTPATIENT
Start: 2022-06-17

## 2022-06-17 RX ADMIN — ACETAMINOPHEN 1000 MG: 500 TABLET ORAL at 10:05

## 2022-06-17 RX ADMIN — IBUPROFEN 800 MG: 800 TABLET, FILM COATED ORAL at 04:43

## 2022-06-17 RX ADMIN — DOCUSATE SODIUM 100 MG: 100 CAPSULE, LIQUID FILLED ORAL at 09:57

## 2022-06-17 RX ADMIN — IBUPROFEN 800 MG: 800 TABLET, FILM COATED ORAL at 17:00

## 2022-06-17 ASSESSMENT — PAIN DESCRIPTION - DESCRIPTORS
DESCRIPTORS: CRAMPING

## 2022-06-17 ASSESSMENT — PAIN SCALES - GENERAL
PAINLEVEL_OUTOF10: 2
PAINLEVEL_OUTOF10: 0

## 2022-06-17 ASSESSMENT — PAIN DESCRIPTION - LOCATION
LOCATION: ABDOMEN

## 2022-06-17 ASSESSMENT — PAIN DESCRIPTION - FREQUENCY
FREQUENCY: INTERMITTENT
FREQUENCY: INTERMITTENT

## 2022-06-17 ASSESSMENT — PAIN - FUNCTIONAL ASSESSMENT
PAIN_FUNCTIONAL_ASSESSMENT: ACTIVITIES ARE NOT PREVENTED

## 2022-06-17 ASSESSMENT — PAIN DESCRIPTION - ORIENTATION
ORIENTATION: LOWER
ORIENTATION: LOWER
ORIENTATION: ANTERIOR;LOWER
ORIENTATION: ANTERIOR;LOWER

## 2022-06-17 ASSESSMENT — PAIN DESCRIPTION - PAIN TYPE
TYPE: ACUTE PAIN
TYPE: ACUTE PAIN

## 2022-06-17 ASSESSMENT — PAIN DESCRIPTION - ONSET
ONSET: GRADUAL
ONSET: GRADUAL

## 2022-06-17 NOTE — FLOWSHEET NOTE
ID bands checked. Infant's ID band removed and stapled to footprint sheet, the mother verified as correct, signed and witnessed by RN. Hugs tag removed. Discharge instructions given and reviewed. Rx's@ Pt's pharmacy Ambulating well at discharge with pain #0. Mother understands to make 6 week check up with Dr Parviz Bundy at [facility]. Mother states she has safe crib for baby and has signed \"Safe Sleep\" paperwork verifying this. Father of baby driving mother and baby home. Mother verbalizes understanding to follow-up with Pediatric Provider, [address] [place] [address] Drive for baby's first check up in 3 days by 6-. Baby pink, without distress, harnessed into carseat at discharge. Circ care reviewed with removal of Circ gauze in 24 hours with warm water & application of Vaseline to Circ with each diaper change. Baby has voided clear, yellow urine since Circ.

## 2022-06-17 NOTE — LACTATION NOTE
Visited. Mom says she is mostly feeding bottles. She says she pumped once yesterday, but only saw dribbles. Support given. I reviewed colostrum vs mature milk supplies and the importance of frequent/consistent stimulation. Mom voices understanding. I offer to assist and she is encouraged to call PRN.  Raza Kebede

## 2022-06-17 NOTE — H&P
Department of Obstetrics and Gynecology   Obstetrics History and Physical        CHIEF COMPLAINT:   Chief Complaint   Patient presents with    Scheduled Induction         HISTORY OF PRESENT ILLNESS:      The patient is a 43 y.o. female at 36w3d. OB History        6    Para   4    Term   3       1    AB   1    Living   3       SAB   1    IAB        Ectopic        Molar        Multiple        Live Births   4            Patient presents with a chief complaint as above and is being admitted for induction for A2GDM    Estimated Due Date: Estimated Date of Delivery: 22    PRENATAL CARE:    Complicated by: gestational hypertension and gestational diabetes class A 2, Obesity, AMA. PAST OB HISTORY  OB History        6    Para   4    Term   3       1    AB   1    Living   3       SAB   1    IAB        Ectopic        Molar        Multiple        Live Births   4                Past Medical History:        Diagnosis Date    Acute appendicitis 2012    Chemical pregnancy     Irregular menses     Kidney stones     Obesity     Pregnant     Prior pregnancy with fetal demise and current pregnancy 2007    36Wks     Past Surgical History:        Procedure Laterality Date    APPENDECTOMY      CHOLECYSTECTOMY      LAPAROSCOPIC APPENDECTOMY  04/10/12    OTHER SURGICAL HISTORY  2010    lump removed from right axilla    OTHER SURGICAL HISTORY  2010    lump removed from groin area    OTHER SURGICAL HISTORY Right 2013    Excision of lesion to R axilla    OTHER SURGICAL HISTORY Right 2013    Closure right axillary wound     Allergies:  Patient has no known allergies.   Social History:    Social History     Socioeconomic History    Marital status:      Spouse name: Eri Chua Number of children: 1    Years of education: Not on file    Highest education level: Not on file   Occupational History    Occupation: unemployed   Tobacco Use    Smoking status: Former Smoker     Packs/day: 1.00     Years: 15.00     Pack years: 15.00     Types: Cigarettes     Quit date: 2017     Years since quittin.5    Smokeless tobacco: Never Used    Tobacco comment: quit when she found out she was pregnant   Vaping Use    Vaping Use: Never used   Substance and Sexual Activity    Alcohol use: No    Drug use: No    Sexual activity: Yes     Partners: Male   Other Topics Concern    Not on file   Social History Narrative    Do you donate blood or plasma? No    Caffeine intake? 3 can of pop    Advance directive? No    Is blood transfusion acceptable in an emergency? Yes             Social Determinants of Health     Financial Resource Strain: Low Risk     Difficulty of Paying Living Expenses: Not very hard   Food Insecurity: Food Insecurity Present    Worried About Running Out of Food in the Last Year: Sometimes true    Eleuterio of Food in the Last Year: Sometimes true   Transportation Needs: No Transportation Needs    Lack of Transportation (Medical): No    Lack of Transportation (Non-Medical):  No   Physical Activity:     Days of Exercise per Week: Not on file    Minutes of Exercise per Session: Not on file   Stress:     Feeling of Stress : Not on file   Social Connections:     Frequency of Communication with Friends and Family: Not on file    Frequency of Social Gatherings with Friends and Family: Not on file    Attends Sabianism Services: Not on file    Active Member of Clubs or Organizations: Not on file    Attends Club or Organization Meetings: Not on file    Marital Status: Not on file   Intimate Partner Violence:     Fear of Current or Ex-Partner: Not on file    Emotionally Abused: Not on file    Physically Abused: Not on file    Sexually Abused: Not on file   Housing Stability: High Risk    Unable to Pay for Housing in the Last Year: Yes    Number of [place] in the Last Year: Not on file    Unstable Housing in the Last Year: Yes     Family History: Problem Relation Age of Onset    High Blood Pressure Father     High Cholesterol Father     Cancer Sister         Lymphoma    High Cholesterol Mother     Diabetes Paternal Aunt     Cancer Maternal Grandfather     Cancer Paternal Grandmother     Stroke Paternal Grandmother     Arthritis Paternal Grandfather      Medications Prior to Admission:  Medications Prior to Admission: metFORMIN (GLUCOPHAGE XR) 500 MG extended release tablet, Take 1 tablet by mouth daily (with breakfast)  Prenatal Vit-Fe Sulfate-FA-DHA (PRENATAL VITAMIN/MIN +DHA) 27-0.8-200 MG CAPS, Take 1 tablet by mouth daily (may substitute any prenatal vitamin covered by insurance)  blood glucose monitor strips, Test 4 times a day & as needed for symptoms of irregular blood glucose. Dispense sufficient amount for indicated testing frequency plus additional to accommodate PRN testing needs. glucose monitoring (FREESTYLE FREEDOM) kit, 1 kit by Does not apply route daily  Lancets MISC, 1 each by Does not apply route 4 times daily  Prenatal MV-Min-FA-Omega-3 (PRENATAL GUMMIES/DHA & FA) 0.4-32.5 MG CHEW, Take 1 tablet by mouth daily    REVIEW OF SYSTEMS:    CONSTITUTIONAL:  negative  RESPIRATORY:  negative  CARDIOVASCULAR:  negative  GASTROINTESTINAL:  negative  ALLERGIC/IMMUNOLOGIC:  negative  NEUROLOGICAL:  negative  BEHAVIOR/PSYCH:  negative    PHYSICAL EXAM:  Blood pressure 119/76, pulse 96, temperature 98 °F (36.7 °C), temperature source Oral, resp. rate 16, last menstrual period 09/14/2021, SpO2 100 %, not currently breastfeeding.   Lab Results   Component Value Date    WBC 6.3 06/15/2022    HGB 10.5 (L) 06/15/2022    HCT 33.4 (L) 06/15/2022    MCV 81.5 06/15/2022     (L) 06/15/2022       Blood Type AB+  GBS- Positive   Rubella- Immune  RPR- Non- reactive  HIV- Non reactive  Hep B- Non reactive    General appearance:  awake, alert, cooperative, no apparent distress, and appears stated age  Neurologic:  Awake, alert, oriented to name, place and time. Lungs:  No increased work of breathing, good air exchange  Abdomen:  Soft, non tender, gravid, consistent with her gestational age,     Fetal heart rate:    Baseline Heart Rate:  135        Accelerations:  present       Variability:  moderate       Decelerations:  absent       Pelvis:  Adequate pelvis    Cervix: 1 cm(outer os 3cm) 50% medium -3      Contraction frequency:  Irregular    Membranes:  Intact    ASSESSMENT AND PLAN:    Labor: Admit, anticipate normal delivery, routine labor orders  History of PPH with last 2 deliveries   Fetus: Reassuring  GBS:positive  Other: IV antibiotic therapy, pitocin, Epidural when ready      I have collaborated and updated Dr. Sarah Mendoza  and the MD agrees with the current POC.     CHELSEA Mathews - GIANLUCA

## 2022-06-17 NOTE — PROGRESS NOTES
Department of Obstetrics and Gynecology  Labor and Delivery   Post Partum Progress Note      SUBJECTIVE:  Doing well with no complaints. Reports bleeding is decreasing and pain is well controlled with medication. Has voided without difficulty. Has not had BM, but + flatus. Eating and drinking well. Denies HA/visual changes/epigastric pain. Bottlefeeding is going well is also pumping. Reports good social support. Denies emotional concerns. OBJECTIVE:      Vitals:  /71   Pulse 75   Temp 98.2 °F (36.8 °C) (Oral)   Resp 16   Ht 5' 9\" (1.753 m)   Wt 230 lb (104.3 kg)   LMP 2021   SpO2 98%   Breastfeeding Unknown   BMI 33.97 kg/m²   Lab Results   Component Value Date    WBC 6.3 06/15/2022    HGB 10.5 (L) 06/15/2022    HCT 33.4 (L) 06/15/2022    MCV 81.5 06/15/2022     (L) 06/15/2022       ABDOMEN:  Soft, non-tender. Fundus firm at u-1. BS present x 4 quadrants. LOCHIA: Normal per pt  LUNGS: CTAB  HEART: RRR  EXTREMITIES: No calf tenderness, erythema or swelling bilaterally       ASSESSMENT:      PPD # 1  S/p   Bottlefeeding well and pumping  GBS Positive- Was treated in labor  AB+    PLAN:     Will plan for discharge today pending infant discharge. Discharge teaching completed including counseling on warning signs (heavy vaginal bleeding, s/s of preeclampsia, fever >100.4, ACHES, s/s of PPD). Rx for colace and ibuprofen. Pt to schedule f/u pp visit at 6 weeks in the office.        CHELSEA Girard - GIANLUCA

## 2022-06-22 ENCOUNTER — APPOINTMENT (OUTPATIENT)
Dept: CT IMAGING | Age: 37
End: 2022-06-22
Payer: MEDICARE

## 2022-06-22 ENCOUNTER — HOSPITAL ENCOUNTER (EMERGENCY)
Age: 37
Discharge: ANOTHER HOSPITAL | End: 2022-06-22
Attending: EMERGENCY MEDICINE
Payer: MEDICARE

## 2022-06-22 ENCOUNTER — HOSPITAL ENCOUNTER (INPATIENT)
Age: 37
LOS: 1 days | Discharge: HOME | DRG: 561 | End: 2022-06-23
Attending: OBSTETRICS & GYNECOLOGY | Admitting: OBSTETRICS & GYNECOLOGY
Payer: MEDICARE

## 2022-06-22 VITALS
RESPIRATION RATE: 13 BRPM | HEART RATE: 70 BPM | SYSTOLIC BLOOD PRESSURE: 129 MMHG | HEIGHT: 69 IN | OXYGEN SATURATION: 96 % | BODY MASS INDEX: 31.1 KG/M2 | WEIGHT: 210 LBS | DIASTOLIC BLOOD PRESSURE: 81 MMHG | TEMPERATURE: 98.2 F

## 2022-06-22 PROBLEM — O14.93 PRE-ECLAMPSIA IN THIRD TRIMESTER: Status: ACTIVE | Noted: 2022-06-22

## 2022-06-22 LAB
ALBUMIN SERPL-MCNC: 3.5 GM/DL (ref 3.4–5)
ALP BLD-CCNC: 76 IU/L (ref 40–129)
ALT SERPL-CCNC: 38 U/L (ref 10–40)
ANION GAP SERPL CALCULATED.3IONS-SCNC: 10 MMOL/L (ref 4–16)
AST SERPL-CCNC: 15 IU/L (ref 15–37)
BACTERIA: NEGATIVE /HPF
BASOPHILS ABSOLUTE: 0 K/CU MM
BASOPHILS RELATIVE PERCENT: 0.6 % (ref 0–1)
BILIRUB SERPL-MCNC: 0.2 MG/DL (ref 0–1)
BILIRUBIN DIRECT: 0.2 MG/DL (ref 0–0.3)
BILIRUBIN URINE: NEGATIVE MG/DL
BILIRUBIN, INDIRECT: 0 MG/DL (ref 0–0.7)
BLOOD, URINE: ABNORMAL
BUN BLDV-MCNC: 13 MG/DL (ref 6–23)
CALCIUM SERPL-MCNC: 8.9 MG/DL (ref 8.3–10.6)
CAST TYPE: ABNORMAL /HPF
CHLORIDE BLD-SCNC: 104 MMOL/L (ref 99–110)
CLARITY: CLEAR
CO2: 25 MMOL/L (ref 21–32)
COLOR: YELLOW
CREAT SERPL-MCNC: 0.8 MG/DL (ref 0.6–1.1)
CRYSTAL TYPE: NEGATIVE /HPF
DIFFERENTIAL TYPE: ABNORMAL
EKG ATRIAL RATE: 68 BPM
EKG DIAGNOSIS: NORMAL
EKG P AXIS: 53 DEGREES
EKG P-R INTERVAL: 122 MS
EKG Q-T INTERVAL: 364 MS
EKG QRS DURATION: 82 MS
EKG QTC CALCULATION (BAZETT): 387 MS
EKG R AXIS: 35 DEGREES
EKG T AXIS: 33 DEGREES
EKG VENTRICULAR RATE: 68 BPM
EOSINOPHILS ABSOLUTE: 0.1 K/CU MM
EOSINOPHILS RELATIVE PERCENT: 1.9 % (ref 0–3)
EPITHELIAL CELLS, UA: ABNORMAL /HPF
GFR AFRICAN AMERICAN: >60 ML/MIN/1.73M2
GFR NON-AFRICAN AMERICAN: >60 ML/MIN/1.73M2
GLUCOSE BLD-MCNC: 77 MG/DL (ref 70–99)
GLUCOSE, URINE: NEGATIVE MG/DL
HCT VFR BLD CALC: 32 % (ref 37–47)
HEMOGLOBIN: 10 GM/DL (ref 12.5–16)
IMMATURE NEUTROPHIL %: 0.7 % (ref 0–0.43)
KETONES, URINE: NEGATIVE MG/DL
LEUKOCYTE ESTERASE, URINE: ABNORMAL
LYMPHOCYTES ABSOLUTE: 1.3 K/CU MM
LYMPHOCYTES RELATIVE PERCENT: 23.6 % (ref 24–44)
MAGNESIUM: 2 MG/DL (ref 1.8–2.4)
MCH RBC QN AUTO: 25.6 PG (ref 27–31)
MCHC RBC AUTO-ENTMCNC: 31.3 % (ref 32–36)
MCV RBC AUTO: 82.1 FL (ref 78–100)
MONOCYTES ABSOLUTE: 0.4 K/CU MM
MONOCYTES RELATIVE PERCENT: 6.9 % (ref 0–4)
NITRITE URINE, QUANTITATIVE: NEGATIVE
PDW BLD-RTO: 16.3 % (ref 11.7–14.9)
PH, URINE: 7.5 (ref 5–8)
PLATELET # BLD: 194 K/CU MM (ref 140–440)
PMV BLD AUTO: 9.8 FL (ref 7.5–11.1)
POTASSIUM SERPL-SCNC: 4.4 MMOL/L (ref 3.5–5.1)
PRO-BNP: 106.8 PG/ML
PROTEIN UA: ABNORMAL MG/DL
RBC # BLD: 3.9 M/CU MM (ref 4.2–5.4)
RBC URINE: ABNORMAL /HPF (ref 0–6)
SEGMENTED NEUTROPHILS ABSOLUTE COUNT: 3.5 K/CU MM
SEGMENTED NEUTROPHILS RELATIVE PERCENT: 66.3 % (ref 36–66)
SODIUM BLD-SCNC: 139 MMOL/L (ref 135–145)
SPECIFIC GRAVITY UA: 1.01 (ref 1–1.03)
TOTAL IMMATURE NEUTOROPHIL: 0.04 K/CU MM
TOTAL PROTEIN: 6.5 GM/DL (ref 6.4–8.2)
UROBILINOGEN, URINE: 0.2 MG/DL (ref 0.2–1)
WBC # BLD: 5.3 K/CU MM (ref 4–10.5)
WBC UA: ABNORMAL /HPF (ref 0–5)

## 2022-06-22 PROCEDURE — 80076 HEPATIC FUNCTION PANEL: CPT

## 2022-06-22 PROCEDURE — 70450 CT HEAD/BRAIN W/O DYE: CPT

## 2022-06-22 PROCEDURE — 96367 TX/PROPH/DG ADDL SEQ IV INF: CPT

## 2022-06-22 PROCEDURE — 99221 1ST HOSP IP/OBS SF/LOW 40: CPT | Performed by: OBSTETRICS & GYNECOLOGY

## 2022-06-22 PROCEDURE — 83735 ASSAY OF MAGNESIUM: CPT

## 2022-06-22 PROCEDURE — 2580000003 HC RX 258: Performed by: OBSTETRICS & GYNECOLOGY

## 2022-06-22 PROCEDURE — 93005 ELECTROCARDIOGRAM TRACING: CPT | Performed by: EMERGENCY MEDICINE

## 2022-06-22 PROCEDURE — 80048 BASIC METABOLIC PNL TOTAL CA: CPT

## 2022-06-22 PROCEDURE — 1220000000 HC SEMI PRIVATE OB R&B

## 2022-06-22 PROCEDURE — 6370000000 HC RX 637 (ALT 250 FOR IP): Performed by: OBSTETRICS & GYNECOLOGY

## 2022-06-22 PROCEDURE — 83880 ASSAY OF NATRIURETIC PEPTIDE: CPT

## 2022-06-22 PROCEDURE — 81001 URINALYSIS AUTO W/SCOPE: CPT

## 2022-06-22 PROCEDURE — 85025 COMPLETE CBC W/AUTO DIFF WBC: CPT

## 2022-06-22 PROCEDURE — 2500000003 HC RX 250 WO HCPCS: Performed by: EMERGENCY MEDICINE

## 2022-06-22 PROCEDURE — 6360000002 HC RX W HCPCS: Performed by: OBSTETRICS & GYNECOLOGY

## 2022-06-22 PROCEDURE — 96375 TX/PRO/DX INJ NEW DRUG ADDON: CPT

## 2022-06-22 PROCEDURE — 96365 THER/PROPH/DIAG IV INF INIT: CPT

## 2022-06-22 PROCEDURE — 2580000003 HC RX 258: Performed by: EMERGENCY MEDICINE

## 2022-06-22 PROCEDURE — 6360000002 HC RX W HCPCS: Performed by: EMERGENCY MEDICINE

## 2022-06-22 PROCEDURE — 93010 ELECTROCARDIOGRAM REPORT: CPT | Performed by: INTERNAL MEDICINE

## 2022-06-22 PROCEDURE — 99285 EMERGENCY DEPT VISIT HI MDM: CPT

## 2022-06-22 PROCEDURE — 96366 THER/PROPH/DIAG IV INF ADDON: CPT

## 2022-06-22 RX ORDER — ONDANSETRON 2 MG/ML
4 INJECTION INTRAMUSCULAR; INTRAVENOUS EVERY 30 MIN PRN
Status: DISCONTINUED | OUTPATIENT
Start: 2022-06-22 | End: 2022-06-22 | Stop reason: HOSPADM

## 2022-06-22 RX ORDER — KETOROLAC TROMETHAMINE 15 MG/ML
15 INJECTION, SOLUTION INTRAMUSCULAR; INTRAVENOUS ONCE
Status: COMPLETED | OUTPATIENT
Start: 2022-06-22 | End: 2022-06-22

## 2022-06-22 RX ORDER — IBUPROFEN 800 MG/1
800 TABLET ORAL EVERY 8 HOURS
Status: DISCONTINUED | OUTPATIENT
Start: 2022-06-22 | End: 2022-06-23 | Stop reason: HOSPADM

## 2022-06-22 RX ORDER — HYDROCODONE BITARTRATE AND ACETAMINOPHEN 5; 325 MG/1; MG/1
1 TABLET ORAL EVERY 4 HOURS PRN
Status: DISCONTINUED | OUTPATIENT
Start: 2022-06-22 | End: 2022-06-23 | Stop reason: HOSPADM

## 2022-06-22 RX ORDER — MAGNESIUM SULFATE IN WATER 40 MG/ML
2000 INJECTION, SOLUTION INTRAVENOUS ONCE
Status: COMPLETED | OUTPATIENT
Start: 2022-06-22 | End: 2022-06-22

## 2022-06-22 RX ORDER — LABETALOL HYDROCHLORIDE 5 MG/ML
40 INJECTION, SOLUTION INTRAVENOUS
Status: ACTIVE | OUTPATIENT
Start: 2022-06-22 | End: 2022-06-22

## 2022-06-22 RX ORDER — HYDROCODONE BITARTRATE AND ACETAMINOPHEN 5; 325 MG/1; MG/1
2 TABLET ORAL EVERY 4 HOURS PRN
Status: DISCONTINUED | OUTPATIENT
Start: 2022-06-22 | End: 2022-06-23 | Stop reason: HOSPADM

## 2022-06-22 RX ORDER — SODIUM CHLORIDE, SODIUM LACTATE, POTASSIUM CHLORIDE, CALCIUM CHLORIDE 600; 310; 30; 20 MG/100ML; MG/100ML; MG/100ML; MG/100ML
INJECTION, SOLUTION INTRAVENOUS CONTINUOUS
Status: DISCONTINUED | OUTPATIENT
Start: 2022-06-22 | End: 2022-06-23 | Stop reason: HOSPADM

## 2022-06-22 RX ORDER — ENOXAPARIN SODIUM 100 MG/ML
60 INJECTION SUBCUTANEOUS EVERY 24 HOURS
Status: DISCONTINUED | OUTPATIENT
Start: 2022-06-22 | End: 2022-06-23 | Stop reason: HOSPADM

## 2022-06-22 RX ORDER — LABETALOL HYDROCHLORIDE 5 MG/ML
80 INJECTION, SOLUTION INTRAVENOUS
Status: ACTIVE | OUTPATIENT
Start: 2022-06-22 | End: 2022-06-22

## 2022-06-22 RX ORDER — LABETALOL HYDROCHLORIDE 5 MG/ML
20 INJECTION, SOLUTION INTRAVENOUS
Status: ACTIVE | OUTPATIENT
Start: 2022-06-22 | End: 2022-06-22

## 2022-06-22 RX ORDER — CALCIUM GLUCONATE 94 MG/ML
1000 INJECTION, SOLUTION INTRAVENOUS PRN
Status: DISCONTINUED | OUTPATIENT
Start: 2022-06-22 | End: 2022-06-23 | Stop reason: HOSPADM

## 2022-06-22 RX ORDER — SODIUM CHLORIDE 0.9 % (FLUSH) 0.9 %
5-40 SYRINGE (ML) INJECTION PRN
Status: DISCONTINUED | OUTPATIENT
Start: 2022-06-22 | End: 2022-06-23 | Stop reason: HOSPADM

## 2022-06-22 RX ORDER — HYDRALAZINE HYDROCHLORIDE 20 MG/ML
10 INJECTION INTRAMUSCULAR; INTRAVENOUS
Status: ACTIVE | OUTPATIENT
Start: 2022-06-22 | End: 2022-06-22

## 2022-06-22 RX ORDER — FENTANYL CITRATE 50 UG/ML
25 INJECTION, SOLUTION INTRAMUSCULAR; INTRAVENOUS ONCE
Status: COMPLETED | OUTPATIENT
Start: 2022-06-22 | End: 2022-06-22

## 2022-06-22 RX ORDER — SODIUM CHLORIDE 0.9 % (FLUSH) 0.9 %
5-40 SYRINGE (ML) INJECTION EVERY 12 HOURS SCHEDULED
Status: DISCONTINUED | OUTPATIENT
Start: 2022-06-22 | End: 2022-06-23 | Stop reason: HOSPADM

## 2022-06-22 RX ORDER — MAGNESIUM SULFATE IN WATER 40 MG/ML
2000 INJECTION, SOLUTION INTRAVENOUS ONCE
Status: DISCONTINUED | OUTPATIENT
Start: 2022-06-22 | End: 2022-06-22 | Stop reason: HOSPADM

## 2022-06-22 RX ORDER — SODIUM CHLORIDE 9 MG/ML
INJECTION, SOLUTION INTRAVENOUS PRN
Status: DISCONTINUED | OUTPATIENT
Start: 2022-06-22 | End: 2022-06-23 | Stop reason: HOSPADM

## 2022-06-22 RX ADMIN — MAGNESIUM SULFATE HEPTAHYDRATE 2000 MG: 2 INJECTION, SOLUTION INTRAVENOUS at 13:30

## 2022-06-22 RX ADMIN — IBUPROFEN 800 MG: 800 TABLET, FILM COATED ORAL at 19:43

## 2022-06-22 RX ADMIN — MAGNESIUM SULFATE HEPTAHYDRATE 2000 MG/HR: 40 INJECTION, SOLUTION INTRAVENOUS at 17:11

## 2022-06-22 RX ADMIN — MAGNESIUM SULFATE HEPTAHYDRATE 2000 MG: 2 INJECTION, SOLUTION INTRAVENOUS at 11:20

## 2022-06-22 RX ADMIN — KETOROLAC TROMETHAMINE 15 MG: 15 INJECTION, SOLUTION INTRAMUSCULAR; INTRAVENOUS at 13:08

## 2022-06-22 RX ADMIN — ENOXAPARIN SODIUM 60 MG: 100 INJECTION SUBCUTANEOUS at 18:05

## 2022-06-22 RX ADMIN — MAGNESIUM SULFATE IN WATER 2000 MG/HR: 40 INJECTION, SOLUTION INTRAVENOUS at 14:59

## 2022-06-22 RX ADMIN — SODIUM CHLORIDE, POTASSIUM CHLORIDE, SODIUM LACTATE AND CALCIUM CHLORIDE: 600; 310; 30; 20 INJECTION, SOLUTION INTRAVENOUS at 16:44

## 2022-06-22 RX ADMIN — LABETALOL HYDROCHLORIDE 0.5 MG/MIN: 5 INJECTION, SOLUTION INTRAVENOUS at 11:36

## 2022-06-22 RX ADMIN — FENTANYL CITRATE 25 MCG: 50 INJECTION, SOLUTION INTRAMUSCULAR; INTRAVENOUS at 12:33

## 2022-06-22 RX ADMIN — HYDROCODONE BITARTRATE AND ACETAMINOPHEN 2 TABLET: 5; 325 TABLET ORAL at 18:04

## 2022-06-22 RX ADMIN — ONDANSETRON 4 MG: 2 INJECTION INTRAMUSCULAR; INTRAVENOUS at 12:34

## 2022-06-22 ASSESSMENT — PAIN DESCRIPTION - ORIENTATION
ORIENTATION: RIGHT;ANTERIOR
ORIENTATION: ANTERIOR
ORIENTATION: ANTERIOR;RIGHT
ORIENTATION: ANTERIOR
ORIENTATION: RIGHT;ANTERIOR

## 2022-06-22 ASSESSMENT — PAIN SCALES - GENERAL
PAINLEVEL_OUTOF10: 5
PAINLEVEL_OUTOF10: 7
PAINLEVEL_OUTOF10: 8
PAINLEVEL_OUTOF10: 0
PAINLEVEL_OUTOF10: 0
PAINLEVEL_OUTOF10: 3
PAINLEVEL_OUTOF10: 0
PAINLEVEL_OUTOF10: 7
PAINLEVEL_OUTOF10: 3
PAINLEVEL_OUTOF10: 3
PAINLEVEL_OUTOF10: 6

## 2022-06-22 ASSESSMENT — PAIN DESCRIPTION - DESCRIPTORS
DESCRIPTORS: ACHING
DESCRIPTORS: THROBBING
DESCRIPTORS: SHARP
DESCRIPTORS: PRESSURE

## 2022-06-22 ASSESSMENT — PAIN DESCRIPTION - PAIN TYPE
TYPE: ACUTE PAIN

## 2022-06-22 ASSESSMENT — PAIN DESCRIPTION - LOCATION
LOCATION: HEAD

## 2022-06-22 ASSESSMENT — PAIN DESCRIPTION - FREQUENCY
FREQUENCY: INTERMITTENT
FREQUENCY: INTERMITTENT
FREQUENCY: CONTINUOUS

## 2022-06-22 ASSESSMENT — PAIN - FUNCTIONAL ASSESSMENT
PAIN_FUNCTIONAL_ASSESSMENT: ACTIVITIES ARE NOT PREVENTED
PAIN_FUNCTIONAL_ASSESSMENT: PREVENTS OR INTERFERES WITH MANY ACTIVE NOT PASSIVE ACTIVITIES
PAIN_FUNCTIONAL_ASSESSMENT: ACTIVITIES ARE NOT PREVENTED
PAIN_FUNCTIONAL_ASSESSMENT: 0-10
PAIN_FUNCTIONAL_ASSESSMENT: ACTIVITIES ARE NOT PREVENTED

## 2022-06-22 ASSESSMENT — PAIN DESCRIPTION - ONSET
ONSET: ON-GOING

## 2022-06-22 NOTE — ED PROVIDER NOTES
Emergency Department Encounter  Location: [address]    Patient: Cheri Anaya  MRN: [id number]  : 1985  Date of evaluation: 2022  ED Provider: Jeremias Van DO, FACEP    Chief Complaint:    Headache (denies head injury, woke up today with headache and also Monday, light sensitivity, denies N/V or changes to vision) and Leg Swelling (Since , bilat leg swelling 2+ pitting edema, vaginal delivery of 5th full term child on -, gestational diabetes, and watching bp during pregnancy)    Pauma:  Cheri Anaya is a 43 y.o. female that presents to the emergency department complaints of headache and elevated blood pressure with swelling in her legs. The patient delivered a full-term infant last Thursday. Dr. Jose L Kwong is her OB/GYN doctor. She states she started having headache on Monday. She is having light sensitivity. She states she has had leg swelling since  and she was having gestational diabetes during pregnancy and they were watching her blood pressure. She did not need to be on blood pressure medication however she states she took her blood pressure this morning and it was elevated and she is having a severe headache across her frontal region. She presents to the emergency department today with concerns of preeclampsia. Patient is breast-feeding. This was her fifth child. ROS - see HPI, below listed is current ROS at time of my eval:  At least 10 systems reviewed and otherwise acutely negative except as in the [address]   General:  No fevers, no chills, no weakness  Eyes:  No recent vison changes, no discharge  ENT:  No sore throat, no nasal congestion, no hearing changes  Cardiovascular:  No chest pain, no palpitations  Respiratory:  No shortness of breath, no cough, no wheezing  Gastrointestinal:  No pain, no nausea, no vomiting, no diarrhea  Musculoskeletal:  No muscle pain, no joint pain  Skin:  No rash, no pruritis, no easy bruising  Neurologic:  No speech problems, positive for headache, no extremity numbness, no extremity tingling, no extremity weakness  Psychiatric:  No anxiety  Genitourinary:  No dysuria, no hematuria  Endocrine:  No unexpected weight gain, no unexpected weight loss  Extremities: Positive for upper and lower extremity edema, no pain    Past Medical History:   Diagnosis Date    Acute appendicitis 2012    Chemical pregnancy     Irregular menses     Kidney stones     Obesity     Pregnant     Prior pregnancy with fetal demise and current pregnancy 2007    36Wks     Past Surgical History:   Procedure Laterality Date    APPENDECTOMY      CHOLECYSTECTOMY      LAPAROSCOPIC APPENDECTOMY  04/10/12    OTHER SURGICAL HISTORY  2010    lump removed from right axilla    OTHER SURGICAL HISTORY  2010    lump removed from groin area    OTHER SURGICAL HISTORY Right 2013    Excision of lesion to R axilla    OTHER SURGICAL HISTORY Right 2013    Closure right axillary wound     Family History   Problem Relation Age of Onset    High Blood Pressure Father     High Cholesterol Father     Cancer Sister         Lymphoma    High Cholesterol Mother     Diabetes Paternal Aunt     Cancer Maternal Grandfather     Cancer Paternal Grandmother     Stroke Paternal Grandmother     Arthritis Paternal Grandfather      Social History     Socioeconomic History    Marital status:      Spouse name: Dolores Lutz Number of children: 1    Years of education: Not on file    Highest education level: Not on file   Occupational History    Occupation: unemployed   Tobacco Use    Smoking status: Former Smoker     Packs/day: 1.00     Years: 15.00     Pack years: 15.00     Types: Cigarettes     Quit date: 2017     Years since quittin.5    Smokeless tobacco: Never Used    Tobacco comment: quit when she found out she was pregnant   Vaping Use    Vaping Use: Never used   Substance and Sexual Activity    Alcohol use: No    Drug use: No    Sexual activity: Yes     Partners: Male   Other Topics Concern    Not on file   Social History Narrative    Do you donate blood or plasma? No    Caffeine intake? 3 can of pop    Advance directive? No    Is blood transfusion acceptable in an emergency? Yes             Social Determinants of Health     Financial Resource Strain: Low Risk     Difficulty of Paying Living Expenses: Not very hard   Food Insecurity: Food Insecurity Present    Worried About Running Out of Food in the Last Year: Sometimes true    Eleuterio of Food in the Last Year: Sometimes true   Transportation Needs: No Transportation Needs    Lack of Transportation (Medical): No    Lack of Transportation (Non-Medical):  No   Physical Activity:     Days of Exercise per Week: Not on file    Minutes of Exercise per Session: Not on file   Stress:     Feeling of Stress : Not on file   Social Connections:     Frequency of Communication with Friends and Family: Not on file    Frequency of Social Gatherings with Friends and Family: Not on file    Attends Catholic Services: Not on file    Active Member of [address] [place] [address] or Organizations: Not on file    Attends Club or Organization Meetings: Not on file    Marital Status: Not on file   Intimate Partner Violence:     Fear of Current or Ex-Partner: Not on file    Emotionally Abused: Not on file    Physically Abused: Not on file    Sexually Abused: Not on file   Housing Stability: High Risk    Unable to Pay for Housing in the Last Year: Yes    Number of [place] in the Last Year: Not on file    Unstable Housing in the Last Year: Yes     Current Facility-Administered Medications   Medication Dose Route Frequency Provider Last Rate Last Admin    labetalol (NORMODYNE;TRANDATE) 300 mg in sodium chloride 0.9 % 300 mL infusion  0.5-3 mg/min IntraVENous Continuous Reina Bautista DO   Stopped at 06/22/22 1501    ondansetron (ZOFRAN) injection 4 mg  4 mg IntraVENous Q30 Min PRN Christel Barron DO   4 mg at 06/22/22 1234    magnesium sulfate (81804 mg/500mL infusion)  2,000 mg/hr IntraVENous Continuous Christel Barron DO   Patient Transferred to Other Facility at 06/22/22 1534    magnesium sulfate 2000 mg in 50 mL IVPB premix  2,000 mg IntraVENous Once Christel Barron DO         Current Outpatient Medications   Medication Sig Dispense Refill    docusate sodium (COLACE) 100 MG capsule Take 1 capsule by mouth 2 times daily 30 capsule 1    ibuprofen (ADVIL;MOTRIN) 800 MG tablet Take 1 tablet by mouth every 8 hours 120 tablet 3    Prenatal Vit-Fe Sulfate-FA-DHA (PRENATAL VITAMIN/MIN +DHA) 27-0.8-200 MG CAPS Take 1 tablet by mouth daily (may substitute any prenatal vitamin covered by insurance) 90 capsule 3    blood glucose monitor strips Test 4 times a day & as needed for symptoms of irregular blood glucose. Dispense sufficient amount for indicated testing frequency plus additional to accommodate PRN testing needs. 200 strip 3    glucose monitoring (FREESTYLE FREEDOM) kit 1 kit by Does not apply route daily 1 kit 0    Lancets MISC 1 each by Does not apply route 4 times daily 200 each 5    Prenatal MV-Min-FA-Omega-3 (PRENATAL GUMMIES/DHA & FA) 0.4-32.5 MG CHEW Take 1 tablet by mouth daily 30 tablet 11     No Known Allergies    Nursing Notes Reviewed    Physical Exam:  ED Triage Vitals [06/22/22 1031]   Enc Vitals Group      BP (!) 168/87      Heart Rate 87      Resp 16      Temp 98.2 °F (36.8 °C)      Temp Source Oral      SpO2 99 %      Weight 210 lb (95.3 kg)      Height 5' 9\" (1.753 m)      Head Circumference       Peak Flow       Pain Score       Pain Loc       Pain Edu? Excl. in [address]? GENERAL APPEARANCE: Awake and alert. Cooperative. No acute distress. Nontoxic in appearance. She is seated on the bed in a darkened room. HEAD: Normocephalic. Atraumatic. EYES: EOM's grossly intact. Sclera anicteric.   Pupils are equally reactive to light  ENT: Tolerates saliva. No trismus. NECK: Supple. Trachea midline. CARDIO: RRR. Radial pulse 2+. LUNGS: Respirations unlabored. CTAB. ABDOMEN: Soft. Non-distended. Non-tender. EXTREMITIES: No acute deformities. +1 pitting edema in lower extremities bilaterally. SKIN: Warm and dry. NEUROLOGICAL: No gross facial drooping. Moves all 4 extremities spontaneously. PSYCHIATRIC: Normal mood.      Labs:  Results for orders placed or performed during the hospital encounter of 16/02/64   Basic Metabolic Panel   Result Value Ref Range    Sodium 139 135 - 145 MMOL/L    Potassium 4.4 3.5 - 5.1 MMOL/L    Chloride 104 99 - 110 mMol/L    CO2 25 21 - 32 MMOL/L    Anion Gap 10 4 - 16    BUN 13 6 - 23 MG/DL    CREATININE 0.8 0.6 - 1.1 MG/DL    Glucose 77 70 - 99 MG/DL    Calcium 8.9 8.3 - 10.6 MG/DL    GFR Non-African American >60 >60 mL/min/1.73m2    GFR African American >60 >60 mL/min/1.73m2   CBC with Auto Differential   Result Value Ref Range    WBC 5.3 4.0 - 10.5 K/CU MM    RBC 3.90 (L) 4.2 - 5.4 M/CU MM    Hemoglobin 10.0 (L) 12.5 - 16.0 GM/DL    Hematocrit 32.0 (L) 37 - 47 %    MCV 82.1 78 - 100 FL    MCH 25.6 (L) 27 - 31 PG    MCHC 31.3 (L) 32.0 - 36.0 %    RDW 16.3 (H) 11.7 - 14.9 %    Platelets 311 458 - 176 K/CU MM    MPV 9.8 7.5 - 11.1 FL    Differential Type AUTOMATED DIFFERENTIAL     Segs Relative 66.3 (H) 36 - 66 %    Lymphocytes % 23.6 (L) 24 - 44 %    Monocytes % 6.9 (H) 0 - 4 %    Eosinophils % 1.9 0 - 3 %    Basophils % 0.6 0 - 1 %    Segs Absolute 3.5 K/CU MM    Lymphocytes Absolute 1.3 K/CU MM    Monocytes Absolute 0.4 K/CU MM    Eosinophils Absolute 0.1 K/CU MM    Basophils Absolute 0.0 K/CU MM    Immature Neutrophil % 0.7 (H) 0 - 0.43 %    Total Immature Neutrophil 0.04 K/CU MM   Urinalysis with Microscopic   Result Value Ref Range    Color, UA YELLOW YELLOW    Clarity, UA CLEAR CLEAR    Glucose, Urine NEGATIVE NEGATIVE MG/DL    Bilirubin Urine NEGATIVE NEGATIVE MG/DL    Ketones, Urine NEGATIVE NEGATIVE MG/DL Specific Gravity, UA 1.015 1.001 - 1.035    Blood, Urine MODERATE (A) NEGATIVE    pH, Urine 7.5 5.0 - 8.0    Protein, UA TRACE (A) NEGATIVE MG/DL    Urobilinogen, Urine 0.2 0.2 - 1.0 MG/DL    Nitrite Urine, Quantitative NEGATIVE NEGATIVE    Leukocyte Esterase, Urine SMALL (A) NEGATIVE    RBC, UA  5 TO 10 0 - 6 /HPF    WBC, UA  10 TO 20  0 - 5 /HPF    Epithelial Cells, UA  3 TO 5 /HPF    Cast Type NO CAST FORMS SEEN NO CAST FORMS SEEN /HPF    Bacteria, UA NEGATIVE NEGATIVE /HPF    Crystal Type NEGATIVE NEGATIVE /HPF   Brain Natriuretic Peptide   Result Value Ref Range    Pro-.8 <300 PG/ML   Magnesium   Result Value Ref Range    Magnesium 2.0 1.8 - 2.4 mg/dl   Hepatic Function Panel   Result Value Ref Range    Albumin 3.5 3.4 - 5.0 GM/DL    Total Bilirubin 0.2 0.0 - 1.0 MG/DL    Bilirubin, Direct 0.2 0.0 - 0.3 MG/DL    Bilirubin, Indirect 0.0 0 - 0.7 MG/DL    Alkaline Phosphatase 76 40 - 129 IU/L    AST 15 15 - 37 IU/L    ALT 38 10 - 40 U/L    Total Protein 6.5 6.4 - 8.2 GM/DL   EKG 12 Lead   Result Value Ref Range    Ventricular Rate 68 BPM    Atrial Rate 68 BPM    P-R Interval 122 ms    QRS Duration 82 ms    Q-T Interval 364 ms    QTc Calculation (Bazett) 387 ms    P Axis 53 degrees    R Axis 35 degrees    T Axis 33 degrees    Diagnosis       Normal sinus rhythm  Normal ECG  No previous ECGs available         EKG (if obtained): (All EKG's are interpreted by myself in the absence of a cardiologist)  The Ekg interpreted by me in the absence of a cardiologist shows. normal sinus rhythm with a rate of 68  Axis is   Normal  QTc is  387  Intervals and Durations are unremarkable. No specific ST-T wave changes appreciated. No evidence of acute ischemia.    No prior EKGs available for comparison    Radiographs (if obtained):  [] The following radiograph was interpreted by myself in the absence of a radiologist:  [x] Radiologist's Report reviewed at time of ED visit:  [address] [place] [address]   Final Result   No acute intracranial abnormality. The total Critical Care time is 45 minutes which excludes separately billable procedures. ED Course and MDM:  Patient presents to the emergency department with a moderate to severe frontal headache with +2 pitting edema. She delivered her baby last Thursday. Her blood pressure upon arrival was 165/90. Patient is showing signs and symptoms of preeclampsia. Patient's platelet count is normal liver functions pending at the time of this dictation. She has a trace of protein in her urine. I discussed her care with Dr. Zahra Mcdaniels who advised me to place the patient on a magnesium drip at 2 g/h. Initially 2 g IV were ordered and additional 2 g IV were ordered over 2-hour period. The patient will be started on 2 g/h constant IV infusion. The patient will be be sent to labor and delivery for admission to Dr. Zahra Mcdaniels service. She has remained stable throughout her ER course. She was started on labetalol drip and 0.5 mg/h. Her blood pressure did come down to 140/87. She was still complaining of headache. Fentanyl 25 mcg with 4 mg of Zofran was given and the patient to speak pressure was then measured 106/76. The labetalol was discontinued. The patient will be transferred to labor and delivery once a bed is available and unit is available to take her to [facility] for continued valuation treatment. Final Impression:  1. Preeclampsia in postpartum period      DISPOSITION Decision To Transfer    Patient referred to: No follow-up provider specified.   Discharge medications:  Discharge Medication List as of 6/22/2022  3:35 PM        (Please note that portions of this note may have been completed with a voice recognition program. Efforts were made to edit the dictations but occasionally words are mis-transcribed.)    Rush Todd DO, [facility]  Board certified in [address], [place]  06/22/22 5082

## 2022-06-22 NOTE — PROGRESS NOTES
Patient arrived to labor and delivery unit via EMS. Patient alert and oriented, pre-eclampsia evaluation negative except for headache 6/10 unrelieved by pain medications.

## 2022-06-22 NOTE — ED NOTES
Nurse report called to [facility] RN on L&D. Bedside report given to [facility] EMT-P with Jesus. Paperwork given to Jesus.       Alejandro Sethi RN  06/22/22 7726

## 2022-06-23 VITALS
RESPIRATION RATE: 16 BRPM | DIASTOLIC BLOOD PRESSURE: 90 MMHG | OXYGEN SATURATION: 100 % | HEART RATE: 92 BPM | TEMPERATURE: 98.8 F | SYSTOLIC BLOOD PRESSURE: 141 MMHG

## 2022-06-23 LAB
ALBUMIN SERPL-MCNC: 3.8 GM/DL (ref 3.4–5)
ALP BLD-CCNC: 91 IU/L (ref 40–128)
ALT SERPL-CCNC: 36 U/L (ref 10–40)
ANION GAP SERPL CALCULATED.3IONS-SCNC: 11 MMOL/L (ref 4–16)
AST SERPL-CCNC: 20 IU/L (ref 15–37)
BILIRUB SERPL-MCNC: 0.2 MG/DL (ref 0–1)
BUN BLDV-MCNC: 11 MG/DL (ref 6–23)
CALCIUM SERPL-MCNC: 7.7 MG/DL (ref 8.3–10.6)
CHLORIDE BLD-SCNC: 100 MMOL/L (ref 99–110)
CO2: 25 MMOL/L (ref 21–32)
CREAT SERPL-MCNC: 0.7 MG/DL (ref 0.6–1.1)
GFR AFRICAN AMERICAN: >60 ML/MIN/1.73M2
GFR NON-AFRICAN AMERICAN: >60 ML/MIN/1.73M2
GLUCOSE BLD-MCNC: 79 MG/DL (ref 70–99)
HCT VFR BLD CALC: 37.1 % (ref 37–47)
HEMOGLOBIN: 11.1 GM/DL (ref 12.5–16)
MCH RBC QN AUTO: 25.1 PG (ref 27–31)
MCHC RBC AUTO-ENTMCNC: 29.9 % (ref 32–36)
MCV RBC AUTO: 83.9 FL (ref 78–100)
PDW BLD-RTO: 16.6 % (ref 11.7–14.9)
PLATELET # BLD: 173 K/CU MM (ref 140–440)
PMV BLD AUTO: 10.4 FL (ref 7.5–11.1)
POTASSIUM SERPL-SCNC: 4.2 MMOL/L (ref 3.5–5.1)
RBC # BLD: 4.42 M/CU MM (ref 4.2–5.4)
SODIUM BLD-SCNC: 136 MMOL/L (ref 135–145)
TOTAL PROTEIN: 7.4 GM/DL (ref 6.4–8.2)
WBC # BLD: 5.1 K/CU MM (ref 4–10.5)

## 2022-06-23 PROCEDURE — 6370000000 HC RX 637 (ALT 250 FOR IP): Performed by: OBSTETRICS & GYNECOLOGY

## 2022-06-23 PROCEDURE — 80053 COMPREHEN METABOLIC PANEL: CPT

## 2022-06-23 PROCEDURE — 85027 COMPLETE CBC AUTOMATED: CPT

## 2022-06-23 PROCEDURE — 6360000002 HC RX W HCPCS: Performed by: OBSTETRICS & GYNECOLOGY

## 2022-06-23 RX ADMIN — IBUPROFEN 800 MG: 800 TABLET, FILM COATED ORAL at 11:08

## 2022-06-23 RX ADMIN — MAGNESIUM SULFATE HEPTAHYDRATE 2000 MG/HR: 40 INJECTION, SOLUTION INTRAVENOUS at 03:19

## 2022-06-23 RX ADMIN — IBUPROFEN 800 MG: 800 TABLET, FILM COATED ORAL at 03:11

## 2022-06-23 ASSESSMENT — PAIN SCALES - GENERAL
PAINLEVEL_OUTOF10: 0
PAINLEVEL_OUTOF10: 3
PAINLEVEL_OUTOF10: 0

## 2022-06-23 ASSESSMENT — PAIN DESCRIPTION - PAIN TYPE: TYPE: ACUTE PAIN

## 2022-06-23 ASSESSMENT — PAIN - FUNCTIONAL ASSESSMENT
PAIN_FUNCTIONAL_ASSESSMENT: ACTIVITIES ARE NOT PREVENTED
PAIN_FUNCTIONAL_ASSESSMENT: ACTIVITIES ARE NOT PREVENTED

## 2022-06-23 ASSESSMENT — PAIN DESCRIPTION - FREQUENCY: FREQUENCY: INTERMITTENT

## 2022-06-23 ASSESSMENT — PAIN DESCRIPTION - LOCATION: LOCATION: HEAD

## 2022-06-23 ASSESSMENT — PAIN DESCRIPTION - ONSET: ONSET: GRADUAL

## 2022-06-23 ASSESSMENT — PAIN DESCRIPTION - DESCRIPTORS: DESCRIPTORS: ACHING

## 2022-06-23 NOTE — FLOWSHEET NOTE
Discharge instructions provided to patient, including s/sx of worsening preeclampsia symptoms. Pt voiced understanding. Instructed to follow in office in week for blood pressure check. Pt encouraged to notify provider and/or return to hospital as necessary. Pt voiced understanding. Pt denies pain or needs at time of discharge. Previously pumped breast milk returned to patient.

## 2022-06-23 NOTE — FLOWSHEET NOTE
This RN at bedside and receiving handoff report from [facility]. Assessment completed. Pt noted to be resting in bed comfortably. SCDs in place. Pt denying any pain or concerns at this time. Call light within reach and bed in lowest position.

## 2022-06-23 NOTE — FLOWSHEET NOTE
Pt awaiting on family to pick her up to take her home. Pt denies any needs at time of discharge. No apparent distress noted.

## 2022-06-23 NOTE — H&P
Mal Gray  1985  Primary Care Physician: Momo Quintero: [facility]    HPI: Chalino Farley is a 43 y.o. female [id number] who delivered on 2022 presented to the [place] emergency room with a severe headache and was found to have blood pressures with systolics in the 604L. Due to her severely elevated blood pressure she was began on IV labetalol and was given magnesium sulfate. Her headache went from a 10 to a 5. In the emergency room in [place] she also underwent a CT scan of her head which was normal.  Upon arrival to [facility], she reports a headache that is 5 out of 10 frontal and temporal in nature. She denies visual changes. She denies light bothering the headache significantly. She does report slight increases in the headache with positional changes. She denies right upper quadrant pain. She does report new onset lower extremity bilateral edema as well as facial edema.           OB History    Para Term  AB Living   6 5 4 1 1 4   SAB IAB Ectopic Molar Multiple Live Births   1 0 0 0 0 5      # Outcome Date GA Lbr Ari/2nd Weight Sex Delivery Anes PTL Lv   6 Term 22 39w2d 01:33 / 00:09 8 lb 15.3 oz (4.062 kg) M Vag-Spont EPI N JAKOB      Name: Kenny Henry: 8  Apgar5: 9   5 SAB 21     OTHER      4 Term 18 39w1d  9 lb 5.7 oz (4.245 kg) M  EPI  JAKOB      Name: Darrencarrie Andrade: 6  Apgar5: 9   3 Term 14 39w0d  8 lb 15 oz (4.054 kg) F Vag-Spont EPI N JAKOB   2 Term 02/05/10 39w0d  8 lb 14 oz (4.026 kg) M Vag-Spont EPI N JAKOB   1  07 36w0d  4 lb 4 oz (1.928 kg) F Vag-Spont  N ND     Past Medical History:   Diagnosis Date    Acute appendicitis 2012    Chemical pregnancy     Irregular menses     Kidney stones     Obesity     Pregnant     Prior pregnancy with fetal demise and current pregnancy 2007    36Wks       Past Surgical History:   Procedure Laterality Date    APPENDECTOMY      CHOLECYSTECTOMY      LAPAROSCOPIC APPENDECTOMY  04/10/12    OTHER SURGICAL HISTORY  2010    lump removed from right axilla    OTHER SURGICAL HISTORY  2010    lump removed from groin area    OTHER SURGICAL HISTORY Right 2013    Excision of lesion to R axilla    OTHER SURGICAL HISTORY Right 2013    Closure right axillary wound       Social History     Socioeconomic History    Marital status:      Spouse name: Karen Lindsay Number of children: 1    Years of education: Not on file    Highest education level: Not on file   Occupational History    Occupation: unemployed   Tobacco Use    Smoking status: Former Smoker     Packs/day: 1.00     Years: 15.00     Pack years: 15.00     Types: Cigarettes     Quit date: 2017     Years since quittin.5    Smokeless tobacco: Never Used    Tobacco comment: quit when she found out she was pregnant   Vaping Use    Vaping Use: Never used   Substance and Sexual Activity    Alcohol use: No    Drug use: No    Sexual activity: Yes     Partners: Male   Other Topics Concern    Not on file   Social History Narrative    Do you donate blood or plasma? No    Caffeine intake? 3 can of pop    Advance directive? No    Is blood transfusion acceptable in an emergency? Yes             Social Determinants of Health     Financial Resource Strain: Low Risk     Difficulty of Paying Living Expenses: Not very hard   Food Insecurity: Food Insecurity Present    Worried About Running Out of Food in the Last Year: Sometimes true    Eleuterio of Food in the Last Year: Sometimes true   Transportation Needs: No Transportation Needs    Lack of Transportation (Medical): No    Lack of Transportation (Non-Medical):  No   Physical Activity:     Days of Exercise per Week: Not on file    Minutes of Exercise per Session: Not on file   Stress:     Feeling of Stress : Not on file   Social Connections:     Frequency of Communication with Friends and Family: Not on file    Frequency of Social Gatherings with Friends and Family: Not on file    Attends Sikhism Services: Not on file    Active Member of Clubs or Organizations: Not on file    Attends Club or Organization Meetings: Not on file    Marital Status: Not on file   Intimate Partner Violence:     Fear of Current or Ex-Partner: Not on file    Emotionally Abused: Not on file    Physically Abused: Not on file    Sexually Abused: Not on file   Housing Stability: High Risk    Unable to Pay for Housing in the Last Year: Yes    Number of [place] in the Last Year: Not on file    Unstable Housing in the Last Year: Yes         MEDICATIONS:  Current Facility-Administered Medications   Medication Dose Route Frequency Provider Last Rate Last Admin    lactated ringers infusion   IntraVENous Continuous Constantine Riley MD 75 mL/hr at 06/22/22 2023 Rate Verify at 06/22/22 2023    sodium chloride flush 0.9 % injection 5-40 mL  5-40 mL IntraVENous 2 times per day Constantine Riley MD        sodium chloride flush 0.9 % injection 5-40 mL  5-40 mL IntraVENous PRN Constantine Riley MD        0.9 % sodium chloride infusion   IntraVENous PRN Constantine Riley MD        enoxaparin (LOVENOX) injection 60 mg  60 mg SubCUTAneous Q24H Constantine Riley MD   60 mg at 06/22/22 1805    magnesium sulfate (30487 mg/500mL infusion)  2,000 mg/hr IntraVENous Continuous Constantine Riley MD 50 mL/hr at 06/22/22 2023 2,000 mg/hr at 06/22/22 2023    calcium gluconate 10 % injection 1,000 mg  1,000 mg IntraVENous PRN Constantine Riley MD        HYDROcodone-acetaminophen [facility]) 5-325 MG per tablet 1 tablet  1 tablet Oral Q4H PRN Constantine Riley MD        Or    HYDROcodone-acetaminophen (NORCO) 5-325 MG per tablet 2 tablet  2 tablet Oral Q4H PRN Constantine Riley MD   2 tablet at 06/22/22 1804    ibuprofen (ADVIL;MOTRIN) tablet 800 mg  800 mg Oral Q8H Blake Robles MD   800 mg at 06/22/22 1943           ALLERGIES:  Allergies as of 06/22/2022    (No Known Allergies)         REVIEW OF SYSTEMS:  General appearance:Appears healthy. Alert; in no acute distress. Pleasant. HEENT: Positive headache. Facial edema  CARDIOVASCULAR: Denies: chest pain, dyspnea on exertion, palpitations  RESPIRATORY: Denies: cough, shortness of breath, wheezing  GI: Denies: abdominal pain, flank pain, nausea, vomiting, diarrhea  : Denies: dysuria, frequency/urgency, hematuria, pelvic pain  MUSCULOSKELETAL: Denies: back pain, joint swelling, muscle pain. Does report increased lower extremity and facial edema  SKIN: Denies: rash, hives. HEMATOLOGIC: Denies Bleeding Disorder, Coagulopathy or Transfusion  NEUROLOGIC: Positive cephalgia  ENDOCRINE: Denies any Endocrinologic disorders      Blood pressure (!) 142/85, pulse 64, temperature 98.2 °F (36.8 °C), temperature source Oral, resp. rate 16, last menstrual period 09/14/2021, SpO2 99 %, unknown if currently breastfeeding. General Appearance:  awake, alert, oriented, in no acute distress  Skin:  Skin color, texture, turgor normal. No rashes or lesions. Mouth/Throat:  Mucosa moist.  No lesions. Pharynx without erythema, edema or exudate. Neck:  neck- supple, no mass, non-tender  Lungs:  No chest wall tenderness. Heart:  Heart regular rate and rhythm  Abdomen:  Soft, non-tender, normal bowel sounds. No bruits, organomegaly or masses. Extremities: Extremities warm to touch, pink, with no edema. and bilateral lower extremity edema up to the level of the knees. Negative Homans' sign.   Musculoskeletal:  negative  Peripheral Pulses:  Normal  Neurologic: 2+ deep tendon patellar reflexes while on magnesium sulfate infusion, no clonus      Diagnostics:      CT HEAD WO CONTRAST    Result Date: 6/22/2022  EXAMINATION: CT OF THE HEAD WITHOUT CONTRAST  6/22/2022 1:13 pm TECHNIQUE: CT of the head was performed without the administration of intravenous contrast. Automated exposure control, iterative reconstruction, and/or weight based adjustment of the mA/kV was utilized to reduce the radiation dose to as low as reasonably achievable. COMPARISON: None. HISTORY: ORDERING SYSTEM PROVIDED HISTORY: head pain TECHNOLOGIST PROVIDED HISTORY: Has a \"code stroke\" or \"stroke alert\" been called? ->No Reason for exam:->head pain Is the patient pregnant?->No FINDINGS: BRAIN/VENTRICLES: There is no acute intracranial hemorrhage, mass effect or midline shift. No abnormal extra-axial fluid collection. The gray-white differentiation is maintained without evidence of an acute infarct. There is no evidence of hydrocephalus. ORBITS: The visualized portion of the orbits demonstrate no acute abnormality. SINUSES: The visualized paranasal sinuses and mastoid air cells demonstrate no acute abnormality. SOFT TISSUES/SKULL:  No acute abnormality of the visualized skull or soft tissues. No acute intracranial abnormality.        Labs:  Results for orders placed or performed during the hospital encounter of 70/76/16   Basic Metabolic Panel   Result Value Ref Range    Sodium 139 135 - 145 MMOL/L    Potassium 4.4 3.5 - 5.1 MMOL/L    Chloride 104 99 - 110 mMol/L    CO2 25 21 - 32 MMOL/L    Anion Gap 10 4 - 16    BUN 13 6 - 23 MG/DL    CREATININE 0.8 0.6 - 1.1 MG/DL    Glucose 77 70 - 99 MG/DL    Calcium 8.9 8.3 - 10.6 MG/DL    GFR Non-African American >60 >60 mL/min/1.73m2    GFR African American >60 >60 mL/min/1.73m2   CBC with Auto Differential   Result Value Ref Range    WBC 5.3 4.0 - 10.5 K/CU MM    RBC 3.90 (L) 4.2 - 5.4 M/CU MM    Hemoglobin 10.0 (L) 12.5 - 16.0 GM/DL    Hematocrit 32.0 (L) 37 - 47 %    MCV 82.1 78 - 100 FL    MCH 25.6 (L) 27 - 31 PG    MCHC 31.3 (L) 32.0 - 36.0 %    RDW 16.3 (H) 11.7 - 14.9 %    Platelets 082 089 - 255 K/CU MM    MPV 9.8 7.5 - 11.1 FL Differential Type AUTOMATED DIFFERENTIAL     Segs Relative 66.3 (H) 36 - 66 %    Lymphocytes % 23.6 (L) 24 - 44 %    Monocytes % 6.9 (H) 0 - 4 %    Eosinophils % 1.9 0 - 3 %    Basophils % 0.6 0 - 1 %    Segs Absolute 3.5 K/CU MM    Lymphocytes Absolute 1.3 K/CU MM    Monocytes Absolute 0.4 K/CU MM    Eosinophils Absolute 0.1 K/CU MM    Basophils Absolute 0.0 K/CU MM    Immature Neutrophil % 0.7 (H) 0 - 0.43 %    Total Immature Neutrophil 0.04 K/CU MM   Urinalysis with Microscopic   Result Value Ref Range    Color, UA YELLOW YELLOW    Clarity, UA CLEAR CLEAR    Glucose, Urine NEGATIVE NEGATIVE MG/DL    Bilirubin Urine NEGATIVE NEGATIVE MG/DL    Ketones, Urine NEGATIVE NEGATIVE MG/DL    Specific Gravity, UA 1.015 1.001 - 1.035    Blood, Urine MODERATE (A) NEGATIVE    pH, Urine 7.5 5.0 - 8.0    Protein, UA TRACE (A) NEGATIVE MG/DL    Urobilinogen, Urine 0.2 0.2 - 1.0 MG/DL    Nitrite Urine, Quantitative NEGATIVE NEGATIVE    Leukocyte Esterase, Urine SMALL (A) NEGATIVE    RBC, UA  5 TO 10 0 - 6 /HPF    WBC, UA  10 TO 20  0 - 5 /HPF    Epithelial Cells, UA  3 TO 5 /HPF    Cast Type NO CAST FORMS SEEN NO CAST FORMS SEEN /HPF    Bacteria, UA NEGATIVE NEGATIVE /HPF    Crystal Type NEGATIVE NEGATIVE /HPF   Brain Natriuretic Peptide   Result Value Ref Range    Pro-.8 <300 PG/ML   Magnesium   Result Value Ref Range    Magnesium 2.0 1.8 - 2.4 mg/dl   Hepatic Function Panel   Result Value Ref Range    Albumin 3.5 3.4 - 5.0 GM/DL    Total Bilirubin 0.2 0.0 - 1.0 MG/DL    Bilirubin, Direct 0.2 0.0 - 0.3 MG/DL    Bilirubin, Indirect 0.0 0 - 0.7 MG/DL    Alkaline Phosphatase 76 40 - 129 IU/L    AST 15 15 - 37 IU/L    ALT 38 10 - 40 U/L    Total Protein 6.5 6.4 - 8.2 GM/DL   EKG 12 Lead   Result Value Ref Range    Ventricular Rate 68 BPM    Atrial Rate 68 BPM    P-R Interval 122 ms    QRS Duration 82 ms    Q-T Interval 364 ms    QTc Calculation (Bazett) 387 ms    P Axis 53 degrees    R Axis 35 degrees    T Axis 33 degrees Diagnosis       Normal sinus rhythm  Normal ECG  No previous ECGs available  Confirmed by [facility] MD, Judd Jeans ([id number]) on 6/22/2022 5:12:35 PM             ASSESSMENT:  Postpartum preeclampsia with severe features based on severe range blood pressure of upon presentation to the emergency room in [place] as well as a severe headache. She did receive IV labetalol in the [place] ER and magnesium sulfate was began. Plan:  1. Continue magnesium sulfate infusion  2. Repeat CBC CMP in the morning  3. Monitor urine output  4. Treat headache with oral analgesics. Ibuprofen scheduled and Norco as needed  5. SCDs and Lovenox for DVT prophylaxis  6. Blood pressures are currently normal on arrival (for [place] [facility] 1460. Blood pressures become elevated will start oral antihypertensives. If severe range blood pressures will give immediate IV antihypertensives.

## 2022-06-23 NOTE — FLOWSHEET NOTE
Pt updated on plan of care for tentative POC for discharge if VS remain WNL. Pt voiced understanding. Call light within reach and bed in lowest position.

## 2022-06-23 NOTE — PROGRESS NOTES
Dr. Moe Johansen updated on patient status and recent BP readings. Pt to be discharged home and F/U in the office in 1 week.

## 2022-06-23 NOTE — PROGRESS NOTES
Consulted Dr. Tobi Mendes for POC. Pt to be monitored for BP changes. If assessment and VS remain WNL, pt can be discharged this evening.

## 2022-06-23 NOTE — FLOWSHEET NOTE
Pt up to bathroom with steady gait. Pt denies any dizziness or lightheadedness. Voiding without difficulty. Pt denying any pain. Pt asking about possibly being discharged later today. This RN voiced understanding and informed patient that we will discuss plan of care further with provider. Pt voiced understanding. Call light within reach and bed in lowest position. Fresh pitcher of ice water provided to patient.

## 2022-06-23 NOTE — FLOWSHEET NOTE
Telephone Dr. Michael Garay regarding plan of care for patient. Reviewed blood pressures, adequate urine output and assessment findings of patient. Pt has not required any antihypertensive medications per this RN's shift. Order to discontinue magnesium sulfate and monitor.

## 2022-06-26 NOTE — DISCHARGE SUMMARY
Discharge Summary    Date:6/26/2022        Patient Name:Marilou Acevedo     YOB: 1985     Age:36 y.o. Admit Date:6/22/2022   Admission Condition:fair   Discharged Condition:good  Discharge Date: 06/26/22     Discharge Diagnoses   Principal Problem:    Preeclampsia in postpartum period  Active Problems:    Pre-eclampsia in third trimester  Resolved Problems:    * No resolved hospital problems. [facility] Stay   Narrative of Hospital Course: Pt readmitted to hospital for PP elevated blood pressures. PT previously experienced headache associated with s/s. PT denies ha, spotty vision or epigastric pain at this time. Consultants:   None    Time Spent on Discharge:  15 minutes were spent in patient examination, evaluation, counseling as well as medication reconciliation, prescriptions for required medications, discharge plan and follow up. Treatments:   Magnesium sulfate infusion    Significant Diagnostic Studies:   Recent Labs:  Labs WNL    Radiology Last 7 Days:  CT HEAD WO CONTRAST    Result Date: 6/22/2022  No acute intracranial abnormality. Discharge Plan   Disposition: Home    Provider Follow-Up:   [facility] Emergency Department  [phone]    As needed, If symptoms worsen    Roly Awan MD  [facility] [phone]    In 1 week  For blood pressure check       Hospital/Incidental Findings Requiring Follow-Up:  Headaches, elevated blood pressure readings. Follow up in office in 1 week. Patient Instructions   Diet: regular diet    Activity: activity as tolerated      Discharge Medications         Medication List      CONTINUE taking these medications    blood glucose test strips  Test 4 times a day & as needed for symptoms of irregular blood glucose.  Dispense sufficient amount for indicated testing frequency plus additional to accommodate PRN testing needs.      docusate sodium 100 MG capsule  Commonly known as: COLACE  Take 1 capsule by mouth 2 times daily     glucose monitoring kit  1 kit by Does not apply route daily     ibuprofen 800 MG tablet  Commonly known as: ADVIL;MOTRIN  Take 1 tablet by mouth every 8 hours     Lancets Misc  1 each by Does not apply route 4 times daily     Prenatal Gummies/DHA & FA 0.4-32.5 MG Chew  Take 1 tablet by mouth daily        STOP taking these medications    Prenatal Vitamin/Min +DHA 27-0.8-200 MG Caps            Electronically signed by CHELSEA Tom CNM on 6/26/22 at 7:11 AM EDT

## 2022-07-07 ENCOUNTER — HOSPITAL ENCOUNTER (EMERGENCY)
Age: 37
End: 2022-07-07
Attending: EMERGENCY MEDICINE
Payer: MEDICARE

## 2022-07-07 ENCOUNTER — APPOINTMENT (OUTPATIENT)
Dept: CT IMAGING | Age: 37
End: 2022-07-07
Payer: MEDICARE

## 2022-07-07 DIAGNOSIS — N20.0 NEPHROLITHIASIS: ICD-10-CM

## 2022-07-07 DIAGNOSIS — N10 ACUTE PYELONEPHRITIS: Primary | ICD-10-CM

## 2022-07-07 DIAGNOSIS — Z87.59 HISTORY OF ECLAMPSIA: ICD-10-CM

## 2022-07-07 LAB
ALBUMIN SERPL-MCNC: 4.3 GM/DL (ref 3.4–5)
ALP BLD-CCNC: 88 IU/L (ref 40–129)
ALT SERPL-CCNC: 22 U/L (ref 10–40)
ANION GAP SERPL CALCULATED.3IONS-SCNC: 12 MMOL/L (ref 4–16)
AST SERPL-CCNC: 14 IU/L (ref 15–37)
BACTERIA: ABNORMAL /HPF
BASOPHILS ABSOLUTE: 0 K/CU MM
BASOPHILS RELATIVE PERCENT: 0.2 % (ref 0–1)
BILIRUB SERPL-MCNC: 0.5 MG/DL (ref 0–1)
BILIRUBIN URINE: NEGATIVE MG/DL
BLOOD, URINE: ABNORMAL
BUN BLDV-MCNC: 9 MG/DL (ref 6–23)
CALCIUM SERPL-MCNC: 9.2 MG/DL (ref 8.3–10.6)
CAST TYPE: ABNORMAL /HPF
CHLORIDE BLD-SCNC: 100 MMOL/L (ref 99–110)
CLARITY: ABNORMAL
CO2: 24 MMOL/L (ref 21–32)
COLOR: YELLOW
COMMENT UA: ABNORMAL
CREAT SERPL-MCNC: 0.9 MG/DL (ref 0.6–1.1)
CRYSTAL TYPE: NEGATIVE /HPF
DIFFERENTIAL TYPE: ABNORMAL
EOSINOPHILS ABSOLUTE: 0 K/CU MM
EOSINOPHILS RELATIVE PERCENT: 0.1 % (ref 0–3)
EPITHELIAL CELLS, UA: ABNORMAL /HPF
GFR AFRICAN AMERICAN: >60 ML/MIN/1.73M2
GFR NON-AFRICAN AMERICAN: >60 ML/MIN/1.73M2
GLUCOSE BLD-MCNC: 113 MG/DL (ref 70–99)
GLUCOSE, URINE: NEGATIVE MG/DL
GONADOTROPIN, CHORIONIC (HCG) QUANT: 0.5 UIU/ML
HCT VFR BLD CALC: 39.5 % (ref 37–47)
HEMOGLOBIN: 12.5 GM/DL (ref 12.5–16)
IMMATURE NEUTROPHIL %: 1 % (ref 0–0.43)
KETONES, URINE: NEGATIVE MG/DL
LACTATE: 1.5 MMOL/L (ref 0.4–2)
LEUKOCYTE ESTERASE, URINE: ABNORMAL
LYMPHOCYTES ABSOLUTE: 0.9 K/CU MM
LYMPHOCYTES RELATIVE PERCENT: 7.4 % (ref 24–44)
MCH RBC QN AUTO: 25.6 PG (ref 27–31)
MCHC RBC AUTO-ENTMCNC: 31.6 % (ref 32–36)
MCV RBC AUTO: 80.9 FL (ref 78–100)
MONOCYTES ABSOLUTE: 1 K/CU MM
MONOCYTES RELATIVE PERCENT: 8.7 % (ref 0–4)
NITRITE URINE, QUANTITATIVE: POSITIVE
PDW BLD-RTO: 16 % (ref 11.7–14.9)
PH, URINE: 6 (ref 5–8)
PLATELET # BLD: 176 K/CU MM (ref 140–440)
PMV BLD AUTO: 9.5 FL (ref 7.5–11.1)
POTASSIUM SERPL-SCNC: 4 MMOL/L (ref 3.5–5.1)
PROTEIN UA: ABNORMAL MG/DL
RBC # BLD: 4.88 M/CU MM (ref 4.2–5.4)
RBC URINE: 8 /HPF (ref 0–6)
SEGMENTED NEUTROPHILS ABSOLUTE COUNT: 9.5 K/CU MM
SEGMENTED NEUTROPHILS RELATIVE PERCENT: 82.6 % (ref 36–66)
SODIUM BLD-SCNC: 136 MMOL/L (ref 135–145)
SPECIFIC GRAVITY UA: 1.01 (ref 1–1.03)
TOTAL IMMATURE NEUTOROPHIL: 0.11 K/CU MM
TOTAL PROTEIN: 8.1 GM/DL (ref 6.4–8.2)
UROBILINOGEN, URINE: 0.2 MG/DL (ref 0.2–1)
WBC # BLD: 11.5 K/CU MM (ref 4–10.5)
WBC UA: 40 /HPF (ref 0–5)

## 2022-07-07 PROCEDURE — 83605 ASSAY OF LACTIC ACID: CPT

## 2022-07-07 PROCEDURE — 87086 URINE CULTURE/COLONY COUNT: CPT

## 2022-07-07 PROCEDURE — 85025 COMPLETE CBC W/AUTO DIFF WBC: CPT

## 2022-07-07 PROCEDURE — 87150 DNA/RNA AMPLIFIED PROBE: CPT

## 2022-07-07 PROCEDURE — 6360000002 HC RX W HCPCS: Performed by: EMERGENCY MEDICINE

## 2022-07-07 PROCEDURE — 96365 THER/PROPH/DIAG IV INF INIT: CPT

## 2022-07-07 PROCEDURE — 87077 CULTURE AEROBIC IDENTIFY: CPT

## 2022-07-07 PROCEDURE — 99285 EMERGENCY DEPT VISIT HI MDM: CPT

## 2022-07-07 PROCEDURE — 2580000003 HC RX 258: Performed by: EMERGENCY MEDICINE

## 2022-07-07 PROCEDURE — 2500000003 HC RX 250 WO HCPCS: Performed by: EMERGENCY MEDICINE

## 2022-07-07 PROCEDURE — 70450 CT HEAD/BRAIN W/O DYE: CPT

## 2022-07-07 PROCEDURE — 84702 CHORIONIC GONADOTROPIN TEST: CPT

## 2022-07-07 PROCEDURE — 87040 BLOOD CULTURE FOR BACTERIA: CPT

## 2022-07-07 PROCEDURE — 6370000000 HC RX 637 (ALT 250 FOR IP): Performed by: EMERGENCY MEDICINE

## 2022-07-07 PROCEDURE — 87186 SC STD MICRODIL/AGAR DIL: CPT

## 2022-07-07 PROCEDURE — 74176 CT ABD & PELVIS W/O CONTRAST: CPT

## 2022-07-07 PROCEDURE — 80053 COMPREHEN METABOLIC PANEL: CPT

## 2022-07-07 PROCEDURE — 96375 TX/PRO/DX INJ NEW DRUG ADDON: CPT

## 2022-07-07 PROCEDURE — 81001 URINALYSIS AUTO W/SCOPE: CPT

## 2022-07-07 RX ORDER — ACETAMINOPHEN 500 MG
1000 TABLET ORAL ONCE
Status: COMPLETED | OUTPATIENT
Start: 2022-07-07 | End: 2022-07-07

## 2022-07-07 RX ORDER — ONDANSETRON 2 MG/ML
4 INJECTION INTRAMUSCULAR; INTRAVENOUS ONCE
Status: COMPLETED | OUTPATIENT
Start: 2022-07-07 | End: 2022-07-07

## 2022-07-07 RX ORDER — SODIUM CHLORIDE 0.9 % (FLUSH) 0.9 %
5-40 SYRINGE (ML) INJECTION PRN
Status: DISCONTINUED | OUTPATIENT
Start: 2022-07-07 | End: 2022-07-08 | Stop reason: HOSPADM

## 2022-07-07 RX ORDER — SODIUM CHLORIDE 0.9 % (FLUSH) 0.9 %
5-40 SYRINGE (ML) INJECTION EVERY 12 HOURS SCHEDULED
Status: DISCONTINUED | OUTPATIENT
Start: 2022-07-07 | End: 2022-07-08 | Stop reason: HOSPADM

## 2022-07-07 RX ORDER — LABETALOL HYDROCHLORIDE 5 MG/ML
20 INJECTION, SOLUTION INTRAVENOUS ONCE
Status: COMPLETED | OUTPATIENT
Start: 2022-07-07 | End: 2022-07-07

## 2022-07-07 RX ORDER — ACETAMINOPHEN 500 MG
1000 TABLET ORAL EVERY 6 HOURS PRN
COMMUNITY

## 2022-07-07 RX ORDER — METOCLOPRAMIDE HYDROCHLORIDE 5 MG/ML
10 INJECTION INTRAMUSCULAR; INTRAVENOUS ONCE
Status: COMPLETED | OUTPATIENT
Start: 2022-07-08 | End: 2022-07-08

## 2022-07-07 RX ORDER — KETOROLAC TROMETHAMINE 30 MG/ML
30 INJECTION, SOLUTION INTRAMUSCULAR; INTRAVENOUS ONCE
Status: COMPLETED | OUTPATIENT
Start: 2022-07-08 | End: 2022-07-08

## 2022-07-07 RX ORDER — SODIUM CHLORIDE 9 MG/ML
INJECTION, SOLUTION INTRAVENOUS PRN
Status: DISCONTINUED | OUTPATIENT
Start: 2022-07-07 | End: 2022-07-08 | Stop reason: HOSPADM

## 2022-07-07 RX ORDER — DIPHENHYDRAMINE HYDROCHLORIDE 50 MG/ML
50 INJECTION INTRAMUSCULAR; INTRAVENOUS ONCE
Status: COMPLETED | OUTPATIENT
Start: 2022-07-08 | End: 2022-07-08

## 2022-07-07 RX ADMIN — SODIUM CHLORIDE, PRESERVATIVE FREE 10 ML: 5 INJECTION INTRAVENOUS at 23:43

## 2022-07-07 RX ADMIN — ONDANSETRON 4 MG: 2 INJECTION INTRAMUSCULAR; INTRAVENOUS at 22:47

## 2022-07-07 RX ADMIN — PIPERACILLIN AND TAZOBACTAM 3375 MG: 3; .375 INJECTION, POWDER, LYOPHILIZED, FOR SOLUTION INTRAVENOUS at 23:43

## 2022-07-07 RX ADMIN — ACETAMINOPHEN 1000 MG: 500 TABLET ORAL at 22:38

## 2022-07-07 RX ADMIN — LABETALOL HYDROCHLORIDE 20 MG: 5 INJECTION, SOLUTION INTRAVENOUS at 22:47

## 2022-07-07 ASSESSMENT — PAIN DESCRIPTION - DESCRIPTORS
DESCRIPTORS: ACHING
DESCRIPTORS: ACHING
DESCRIPTORS: DISCOMFORT

## 2022-07-07 ASSESSMENT — PAIN - FUNCTIONAL ASSESSMENT: PAIN_FUNCTIONAL_ASSESSMENT: 0-10

## 2022-07-07 ASSESSMENT — PAIN SCALES - GENERAL
PAINLEVEL_OUTOF10: 10

## 2022-07-07 ASSESSMENT — PAIN DESCRIPTION - FREQUENCY: FREQUENCY: CONTINUOUS

## 2022-07-07 ASSESSMENT — PAIN DESCRIPTION - LOCATION
LOCATION: HEAD

## 2022-07-07 ASSESSMENT — PAIN DESCRIPTION - ONSET: ONSET: PROGRESSIVE

## 2022-07-08 ENCOUNTER — HOSPITAL ENCOUNTER (INPATIENT)
Age: 37
LOS: 2 days | Discharge: HOME | DRG: 720 | End: 2022-07-10
Attending: INTERNAL MEDICINE | Admitting: INTERNAL MEDICINE
Payer: MEDICARE

## 2022-07-08 ENCOUNTER — ANESTHESIA (OUTPATIENT)
Dept: OPERATING ROOM | Age: 37
DRG: 720 | End: 2022-07-08
Payer: MEDICARE

## 2022-07-08 ENCOUNTER — APPOINTMENT (OUTPATIENT)
Dept: GENERAL RADIOLOGY | Age: 37
DRG: 720 | End: 2022-07-08
Attending: INTERNAL MEDICINE
Payer: MEDICARE

## 2022-07-08 ENCOUNTER — ANESTHESIA EVENT (OUTPATIENT)
Dept: OPERATING ROOM | Age: 37
DRG: 720 | End: 2022-07-08
Payer: MEDICARE

## 2022-07-08 VITALS
SYSTOLIC BLOOD PRESSURE: 107 MMHG | OXYGEN SATURATION: 98 % | BODY MASS INDEX: 31.1 KG/M2 | WEIGHT: 210 LBS | HEIGHT: 69 IN | DIASTOLIC BLOOD PRESSURE: 70 MMHG | RESPIRATION RATE: 16 BRPM | HEART RATE: 86 BPM | TEMPERATURE: 99 F

## 2022-07-08 PROBLEM — N12 PYELONEPHRITIS: Status: ACTIVE | Noted: 2022-07-08

## 2022-07-08 PROBLEM — N20.0 NEPHROLITHIASIS: Status: ACTIVE | Noted: 2022-07-08

## 2022-07-08 PROCEDURE — C1758 CATHETER, URETERAL: HCPCS | Performed by: SPECIALIST

## 2022-07-08 PROCEDURE — 3700000000 HC ANESTHESIA ATTENDED CARE: Performed by: SPECIALIST

## 2022-07-08 PROCEDURE — 3600000003 HC SURGERY LEVEL 3 BASE: Performed by: SPECIALIST

## 2022-07-08 PROCEDURE — 6370000000 HC RX 637 (ALT 250 FOR IP): Performed by: INTERNAL MEDICINE

## 2022-07-08 PROCEDURE — 2580000003 HC RX 258: Performed by: HOSPITALIST

## 2022-07-08 PROCEDURE — BT1D1ZZ FLUOROSCOPY OF RIGHT KIDNEY, URETER AND BLADDER USING LOW OSMOLAR CONTRAST: ICD-10-PCS | Performed by: SPECIALIST

## 2022-07-08 PROCEDURE — 6370000000 HC RX 637 (ALT 250 FOR IP): Performed by: HOSPITALIST

## 2022-07-08 PROCEDURE — 6360000002 HC RX W HCPCS: Performed by: STUDENT IN AN ORGANIZED HEALTH CARE EDUCATION/TRAINING PROGRAM

## 2022-07-08 PROCEDURE — 6360000002 HC RX W HCPCS: Performed by: HOSPITALIST

## 2022-07-08 PROCEDURE — 2500000003 HC RX 250 WO HCPCS: Performed by: NURSE ANESTHETIST, CERTIFIED REGISTERED

## 2022-07-08 PROCEDURE — 2709999900 HC NON-CHARGEABLE SUPPLY: Performed by: SPECIALIST

## 2022-07-08 PROCEDURE — 0T768DZ DILATION OF RIGHT URETER WITH INTRALUMINAL DEVICE, VIA NATURAL OR ARTIFICIAL OPENING ENDOSCOPIC: ICD-10-PCS | Performed by: SPECIALIST

## 2022-07-08 PROCEDURE — C2617 STENT, NON-COR, TEM W/O DEL: HCPCS | Performed by: SPECIALIST

## 2022-07-08 PROCEDURE — 96375 TX/PRO/DX INJ NEW DRUG ADDON: CPT

## 2022-07-08 PROCEDURE — 3700000001 HC ADD 15 MINUTES (ANESTHESIA): Performed by: SPECIALIST

## 2022-07-08 PROCEDURE — 6360000002 HC RX W HCPCS: Performed by: INTERNAL MEDICINE

## 2022-07-08 PROCEDURE — 94761 N-INVAS EAR/PLS OXIMETRY MLT: CPT

## 2022-07-08 PROCEDURE — 2140000000 HC CCU INTERMEDIATE R&B

## 2022-07-08 PROCEDURE — C1769 GUIDE WIRE: HCPCS | Performed by: SPECIALIST

## 2022-07-08 PROCEDURE — 6360000002 HC RX W HCPCS: Performed by: NURSE ANESTHETIST, CERTIFIED REGISTERED

## 2022-07-08 PROCEDURE — 2580000003 HC RX 258: Performed by: INTERNAL MEDICINE

## 2022-07-08 PROCEDURE — 3600000013 HC SURGERY LEVEL 3 ADDTL 15MIN: Performed by: SPECIALIST

## 2022-07-08 PROCEDURE — 6360000002 HC RX W HCPCS: Performed by: EMERGENCY MEDICINE

## 2022-07-08 PROCEDURE — 76000 FLUOROSCOPY <1 HR PHYS/QHP: CPT

## 2022-07-08 PROCEDURE — 2580000003 HC RX 258: Performed by: NURSE ANESTHETIST, CERTIFIED REGISTERED

## 2022-07-08 DEVICE — VARIABLE LENGTH URETERAL STENT
Type: IMPLANTABLE DEVICE | Site: URETER | Status: FUNCTIONAL
Brand: CONTOUR VL™

## 2022-07-08 RX ORDER — DIPHENHYDRAMINE HYDROCHLORIDE 50 MG/ML
25 INJECTION INTRAMUSCULAR; INTRAVENOUS ONCE
Status: COMPLETED | OUTPATIENT
Start: 2022-07-08 | End: 2022-07-08

## 2022-07-08 RX ORDER — BUTALBITAL, ACETAMINOPHEN AND CAFFEINE 50; 325; 40 MG/1; MG/1; MG/1
1 TABLET ORAL EVERY 4 HOURS PRN
Status: DISCONTINUED | OUTPATIENT
Start: 2022-07-08 | End: 2022-07-10 | Stop reason: HOSPADM

## 2022-07-08 RX ORDER — ONDANSETRON 2 MG/ML
4 INJECTION INTRAMUSCULAR; INTRAVENOUS EVERY 6 HOURS PRN
Status: DISCONTINUED | OUTPATIENT
Start: 2022-07-08 | End: 2022-07-10 | Stop reason: HOSPADM

## 2022-07-08 RX ORDER — ONDANSETRON 4 MG/1
4 TABLET, ORALLY DISINTEGRATING ORAL EVERY 8 HOURS PRN
Status: DISCONTINUED | OUTPATIENT
Start: 2022-07-08 | End: 2022-07-10 | Stop reason: HOSPADM

## 2022-07-08 RX ORDER — ONDANSETRON 4 MG/1
4 TABLET, ORALLY DISINTEGRATING ORAL EVERY 8 HOURS PRN
Qty: 90 TABLET | Refills: 0 | Status: SHIPPED | OUTPATIENT
Start: 2022-07-08 | End: 2022-07-10

## 2022-07-08 RX ORDER — ACETAMINOPHEN 650 MG/1
650 SUPPOSITORY RECTAL EVERY 6 HOURS PRN
Status: DISCONTINUED | OUTPATIENT
Start: 2022-07-08 | End: 2022-07-10 | Stop reason: HOSPADM

## 2022-07-08 RX ORDER — KETOROLAC TROMETHAMINE 30 MG/ML
30 INJECTION, SOLUTION INTRAMUSCULAR; INTRAVENOUS EVERY 6 HOURS PRN
Status: DISCONTINUED | OUTPATIENT
Start: 2022-07-08 | End: 2022-07-09

## 2022-07-08 RX ORDER — SULFAMETHOXAZOLE AND TRIMETHOPRIM 800; 160 MG/1; MG/1
1 TABLET ORAL 2 TIMES DAILY
Qty: 16 TABLET | Refills: 0 | Status: SHIPPED | OUTPATIENT
Start: 2022-07-08 | End: 2022-07-10 | Stop reason: HOSPADM

## 2022-07-08 RX ORDER — SODIUM CHLORIDE 0.9 % (FLUSH) 0.9 %
10 SYRINGE (ML) INJECTION PRN
Status: DISCONTINUED | OUTPATIENT
Start: 2022-07-08 | End: 2022-07-10 | Stop reason: HOSPADM

## 2022-07-08 RX ORDER — ENOXAPARIN SODIUM 100 MG/ML
40 INJECTION SUBCUTANEOUS DAILY
Status: DISCONTINUED | OUTPATIENT
Start: 2022-07-08 | End: 2022-07-10 | Stop reason: HOSPADM

## 2022-07-08 RX ORDER — MIDAZOLAM HYDROCHLORIDE 1 MG/ML
INJECTION INTRAMUSCULAR; INTRAVENOUS PRN
Status: DISCONTINUED | OUTPATIENT
Start: 2022-07-08 | End: 2022-07-08 | Stop reason: SDUPTHER

## 2022-07-08 RX ORDER — ACETAMINOPHEN 325 MG/1
650 TABLET ORAL EVERY 6 HOURS PRN
Status: DISCONTINUED | OUTPATIENT
Start: 2022-07-08 | End: 2022-07-10 | Stop reason: HOSPADM

## 2022-07-08 RX ORDER — SODIUM CHLORIDE, SODIUM LACTATE, POTASSIUM CHLORIDE, CALCIUM CHLORIDE 600; 310; 30; 20 MG/100ML; MG/100ML; MG/100ML; MG/100ML
INJECTION, SOLUTION INTRAVENOUS CONTINUOUS PRN
Status: DISCONTINUED | OUTPATIENT
Start: 2022-07-08 | End: 2022-07-08 | Stop reason: SDUPTHER

## 2022-07-08 RX ORDER — 0.9 % SODIUM CHLORIDE 0.9 %
30 INTRAVENOUS SOLUTION INTRAVENOUS ONCE
Status: COMPLETED | OUTPATIENT
Start: 2022-07-08 | End: 2022-07-08

## 2022-07-08 RX ORDER — SODIUM CHLORIDE 9 MG/ML
INJECTION, SOLUTION INTRAVENOUS CONTINUOUS
Status: DISCONTINUED | OUTPATIENT
Start: 2022-07-08 | End: 2022-07-10 | Stop reason: HOSPADM

## 2022-07-08 RX ORDER — SODIUM CHLORIDE 0.9 % (FLUSH) 0.9 %
10 SYRINGE (ML) INJECTION EVERY 12 HOURS SCHEDULED
Status: DISCONTINUED | OUTPATIENT
Start: 2022-07-08 | End: 2022-07-10 | Stop reason: HOSPADM

## 2022-07-08 RX ORDER — LIDOCAINE HYDROCHLORIDE 20 MG/ML
INJECTION, SOLUTION EPIDURAL; INFILTRATION; INTRACAUDAL; PERINEURAL PRN
Status: DISCONTINUED | OUTPATIENT
Start: 2022-07-08 | End: 2022-07-08 | Stop reason: SDUPTHER

## 2022-07-08 RX ORDER — FENTANYL CITRATE 50 UG/ML
INJECTION, SOLUTION INTRAMUSCULAR; INTRAVENOUS PRN
Status: DISCONTINUED | OUTPATIENT
Start: 2022-07-08 | End: 2022-07-08 | Stop reason: SDUPTHER

## 2022-07-08 RX ORDER — SODIUM CHLORIDE 9 MG/ML
INJECTION, SOLUTION INTRAVENOUS PRN
Status: DISCONTINUED | OUTPATIENT
Start: 2022-07-08 | End: 2022-07-10 | Stop reason: HOSPADM

## 2022-07-08 RX ORDER — PROPOFOL 10 MG/ML
INJECTION, EMULSION INTRAVENOUS PRN
Status: DISCONTINUED | OUTPATIENT
Start: 2022-07-08 | End: 2022-07-08 | Stop reason: SDUPTHER

## 2022-07-08 RX ADMIN — KETOROLAC TROMETHAMINE 30 MG: 30 INJECTION, SOLUTION INTRAMUSCULAR; INTRAVENOUS at 00:05

## 2022-07-08 RX ADMIN — BUTALBITAL, ACETAMINOPHEN AND CAFFEINE 1 TABLET: 50; 325; 40 TABLET ORAL at 23:03

## 2022-07-08 RX ADMIN — SODIUM CHLORIDE, POTASSIUM CHLORIDE, SODIUM LACTATE AND CALCIUM CHLORIDE: 600; 310; 30; 20 INJECTION, SOLUTION INTRAVENOUS at 14:44

## 2022-07-08 RX ADMIN — DIPHENHYDRAMINE HYDROCHLORIDE 25 MG: 50 INJECTION, SOLUTION INTRAMUSCULAR; INTRAVENOUS at 23:03

## 2022-07-08 RX ADMIN — ENOXAPARIN SODIUM 40 MG: 100 INJECTION SUBCUTANEOUS at 08:09

## 2022-07-08 RX ADMIN — KETOROLAC TROMETHAMINE 30 MG: 30 INJECTION, SOLUTION INTRAMUSCULAR at 11:34

## 2022-07-08 RX ADMIN — PROPOFOL 50 MG: 10 INJECTION, EMULSION INTRAVENOUS at 15:02

## 2022-07-08 RX ADMIN — PIPERACILLIN AND TAZOBACTAM 3375 MG: 3; .375 INJECTION, POWDER, LYOPHILIZED, FOR SOLUTION INTRAVENOUS at 08:14

## 2022-07-08 RX ADMIN — PROPOFOL 50 MG: 10 INJECTION, EMULSION INTRAVENOUS at 14:57

## 2022-07-08 RX ADMIN — FENTANYL CITRATE 50 MCG: 50 INJECTION, SOLUTION INTRAMUSCULAR; INTRAVENOUS at 14:50

## 2022-07-08 RX ADMIN — PROPOFOL 60 MG: 10 INJECTION, EMULSION INTRAVENOUS at 14:50

## 2022-07-08 RX ADMIN — METOCLOPRAMIDE HYDROCHLORIDE 10 MG: 5 INJECTION INTRAMUSCULAR; INTRAVENOUS at 00:05

## 2022-07-08 RX ADMIN — ACETAMINOPHEN 650 MG: 325 TABLET ORAL at 12:58

## 2022-07-08 RX ADMIN — ACETAMINOPHEN 650 MG: 325 TABLET ORAL at 03:38

## 2022-07-08 RX ADMIN — LIDOCAINE HYDROCHLORIDE 60 MG: 20 INJECTION, SOLUTION EPIDURAL; INFILTRATION; INTRACAUDAL; PERINEURAL at 14:50

## 2022-07-08 RX ADMIN — SODIUM CHLORIDE, PRESERVATIVE FREE 10 ML: 5 INJECTION INTRAVENOUS at 08:09

## 2022-07-08 RX ADMIN — MIDAZOLAM 2 MG: 1 INJECTION INTRAMUSCULAR; INTRAVENOUS at 14:40

## 2022-07-08 RX ADMIN — SODIUM CHLORIDE: 9 INJECTION, SOLUTION INTRAVENOUS at 03:45

## 2022-07-08 RX ADMIN — FENTANYL CITRATE 50 MCG: 50 INJECTION, SOLUTION INTRAMUSCULAR; INTRAVENOUS at 15:01

## 2022-07-08 RX ADMIN — SODIUM CHLORIDE 2334 ML: 9 INJECTION, SOLUTION INTRAVENOUS at 20:46

## 2022-07-08 RX ADMIN — PIPERACILLIN AND TAZOBACTAM 3375 MG: 3; .375 INJECTION, POWDER, LYOPHILIZED, FOR SOLUTION INTRAVENOUS at 14:52

## 2022-07-08 RX ADMIN — BUTALBITAL, ACETAMINOPHEN AND CAFFEINE 1 TABLET: 50; 325; 40 TABLET ORAL at 20:09

## 2022-07-08 RX ADMIN — PIPERACILLIN AND TAZOBACTAM 3375 MG: 3; .375 INJECTION, POWDER, LYOPHILIZED, FOR SOLUTION INTRAVENOUS at 23:04

## 2022-07-08 RX ADMIN — PROPOFOL 50 MG: 10 INJECTION, EMULSION INTRAVENOUS at 14:53

## 2022-07-08 RX ADMIN — DIPHENHYDRAMINE HYDROCHLORIDE 50 MG: 50 INJECTION, SOLUTION INTRAMUSCULAR; INTRAVENOUS at 00:04

## 2022-07-08 RX ADMIN — SODIUM CHLORIDE: 9 INJECTION, SOLUTION INTRAVENOUS at 20:11

## 2022-07-08 ASSESSMENT — PAIN - FUNCTIONAL ASSESSMENT
PAIN_FUNCTIONAL_ASSESSMENT: PREVENTS OR INTERFERES SOME ACTIVE ACTIVITIES AND ADLS
PAIN_FUNCTIONAL_ASSESSMENT: NONE - DENIES PAIN
PAIN_FUNCTIONAL_ASSESSMENT: ACTIVITIES ARE NOT PREVENTED

## 2022-07-08 ASSESSMENT — PAIN DESCRIPTION - LOCATION
LOCATION: HEAD

## 2022-07-08 ASSESSMENT — ENCOUNTER SYMPTOMS
EYE DISCHARGE: 0
VOMITING: 0
ABDOMINAL PAIN: 0
EYE PAIN: 0
NAUSEA: 0
SINUS PRESSURE: 0
BLOOD IN STOOL: 0
DIARRHEA: 0
BACK PAIN: 0
SHORTNESS OF BREATH: 0
CHEST TIGHTNESS: 0
SINUS PAIN: 0
VOICE CHANGE: 0
COUGH: 0

## 2022-07-08 ASSESSMENT — PAIN DESCRIPTION - ORIENTATION: ORIENTATION: ANTERIOR

## 2022-07-08 ASSESSMENT — PAIN DESCRIPTION - DESCRIPTORS
DESCRIPTORS: ACHING
DESCRIPTORS: THROBBING
DESCRIPTORS: THROBBING
DESCRIPTORS: ACHING

## 2022-07-08 ASSESSMENT — PAIN SCALES - GENERAL
PAINLEVEL_OUTOF10: 5
PAINLEVEL_OUTOF10: 4
PAINLEVEL_OUTOF10: 10
PAINLEVEL_OUTOF10: 7
PAINLEVEL_OUTOF10: 4
PAINLEVEL_OUTOF10: 5
PAINLEVEL_OUTOF10: 10
PAINLEVEL_OUTOF10: 9

## 2022-07-08 ASSESSMENT — PAIN DESCRIPTION - FREQUENCY: FREQUENCY: CONTINUOUS

## 2022-07-08 ASSESSMENT — PAIN DESCRIPTION - PAIN TYPE: TYPE: ACUTE PAIN

## 2022-07-08 ASSESSMENT — PAIN DESCRIPTION - ONSET: ONSET: GRADUAL

## 2022-07-08 NOTE — ED PROVIDER NOTES
HPI    Review of Systems    Family History   Problem Relation Age of Onset    High Blood Pressure Father     High Cholesterol Father     Cancer Sister         Lymphoma    High Cholesterol Mother     Diabetes Paternal Aunt     Cancer Maternal Grandfather     Cancer Paternal Grandmother     Stroke Paternal Grandmother     Arthritis Paternal Grandfather      Social History     Socioeconomic History    Marital status:      Spouse name: Val Or Number of children: 1    Years of education: Not on file    Highest education level: Not on file   Occupational History    Occupation: unemployed   Tobacco Use    Smoking status: Former Smoker     Packs/day: 1.00     Years: 15.00     Pack years: 15.00     Types: Cigarettes     Quit date: 2017     Years since quittin.6    Smokeless tobacco: Never Used    Tobacco comment: quit when she found out she was pregnant   Vaping Use    Vaping Use: Never used   Substance and Sexual Activity    Alcohol use: No    Drug use: No    Sexual activity: Yes     Partners: Male   Other Topics Concern    Not on file   Social History Narrative    Do you donate blood or plasma? No    Caffeine intake? 3 can of pop    Advance directive? No    Is blood transfusion acceptable in an emergency? Yes             Social Determinants of Health     Financial Resource Strain: Low Risk     Difficulty of Paying Living Expenses: Not very hard   Food Insecurity: Food Insecurity Present    Worried About Running Out of Food in the Last Year: Sometimes true    Eleuterio of Food in the Last Year: Sometimes true   Transportation Needs: No Transportation Needs    Lack of Transportation (Medical): No    Lack of Transportation (Non-Medical):  No   Physical Activity:     Days of Exercise per Week: Not on file    Minutes of Exercise per Session: Not on file   Stress:     Feeling of Stress : Not on file   Social Connections:     Frequency of Communication with Friends and Family: Not on file    Frequency of Social Gatherings with Friends and Family: Not on file    Attends Religion Services: Not on file    Active Member of Clubs or Organizations: Not on file    Attends Club or Organization Meetings: Not on file    Marital Status: Not on file   Intimate Partner Violence:     Fear of Current or Ex-Partner: Not on file    Emotionally Abused: Not on file    Physically Abused: Not on file    Sexually Abused: Not on file   Housing Stability: High Risk    Unable to Pay for Housing in the Last Year: Yes    Number of [place] in the Last Year: Not on file    Unstable Housing in the Last Year: Yes     Past Surgical History:   Procedure Laterality Date    APPENDECTOMY      CHOLECYSTECTOMY      LAPAROSCOPIC APPENDECTOMY  04/10/12    OTHER SURGICAL HISTORY  7/2010    lump removed from right axilla    OTHER SURGICAL HISTORY  9/2010    lump removed from groin area    OTHER SURGICAL HISTORY Right 4/11/2013    Excision of lesion to R axilla    OTHER SURGICAL HISTORY Right 4/29/2013    Closure right axillary wound     Past Medical History:   Diagnosis Date    Acute appendicitis 4/11/2012    Chemical pregnancy     Irregular menses     Kidney stones     Obesity     Pregnant     Prior pregnancy with fetal demise and current pregnancy 02/2007    36Wks     No Known Allergies  Prior to Admission medications    Medication Sig Start Date End Date Taking? Authorizing Provider   acetaminophen (TYLENOL) 500 MG tablet Take 1,000 mg by mouth every 6 hours as needed for Pain   Yes Historical Provider, MD   docusate sodium (COLACE) 100 MG capsule Take 1 capsule by mouth 2 times daily 6/17/22   CHELSEA Rivera CNM   ibuprofen (ADVIL;MOTRIN) 800 MG tablet Take 1 tablet by mouth every 8 hours 6/17/22   CHELSEA Rivera CNM   blood glucose monitor strips Test 4 times a day & as needed for symptoms of irregular blood glucose.  Dispense sufficient amount for indicated testing frequency plus additional to accommodate PRN testing needs. 4/15/22   Blake Delgado MD   glucose monitoring (FREESTYLE FREEDOM) kit 1 kit by Does not apply route daily 4/15/22   Ant Bearden MD   Lancets MISC 1 each by Does not apply route 4 times daily 4/15/22   Ant Bearden MD   Prenatal MV-Min-FA-Omega-3 (PRENATAL GUMMIES/DHA & FA) 0.4-32.5 MG CHEW Take 1 tablet by mouth daily 11/11/21   Ant Bearden MD       /61   Pulse (P) 79   Temp (!) 103.9 °F (39.9 °C) (Oral)   Resp (P) 14   Ht 5' 9\" (1.753 m)   Wt 210 lb (95.3 kg)   LMP 09/14/2021   SpO2 96%   BMI 31.01 kg/m²     Physical Exam    MDM:    Labs Reviewed   CBC WITH AUTO DIFFERENTIAL - Abnormal; Notable for the following components:       Result Value    WBC 11.5 (*)     MCH 25.6 (*)     MCHC 31.6 (*)     RDW 16.0 (*)     Segs Relative 82.6 (*)     Lymphocytes % 7.4 (*)     Monocytes % 8.7 (*)     Immature Neutrophil % 1.0 (*)     All other components within normal limits   COMPREHENSIVE METABOLIC PANEL - Abnormal; Notable for the following components:    Glucose 113 (*)     AST 14 (*)     All other components within normal limits   URINALYSIS - Abnormal; Notable for the following components:    Color, UA YELLOW (*)     Clarity, UA SLIGHTLY CLOUDY (*)     Blood, Urine LARGE (*)     Protein, UA TRACE (*)     Nitrite Urine, Quantitative POSITIVE (*)     Leukocyte Esterase, Urine MODERATE (*)     RBC, UA 8 (*)     WBC, UA 40 (*)     Bacteria, UA 2+ (*)     All other components within normal limits   CULTURE, BLOOD 1   CULTURE, BLOOD 2   CULTURE, URINE   LACTIC ACID   HCG, QUANTITATIVE, PREGNANCY       CT HEAD WO CONTRAST   Final Result   No acute intracranial abnormality. CT ABDOMEN PELVIS WO CONTRAST   Final Result   1. Mild degree of right hydronephrosis and hydroureter, secondary to a 4 mm   calculus within the proximal right ureter.   Right perinephric stranding is   present, and may be related to obstructive uropathy or infection. Multiple   additional right intrarenal calculi are present, largest measuring 12 mm   2. Nonobstructing left intrarenal calculi largest measuring 4 mm   3. 6.5 x 4 cm right adnexal cyst, decreased since the prior study. This was   evaluated previously with ultrasound   4. Prior cholecystectomy and appendectomy            Upon arrival to the emergency department a blood pressure which was taken by me at bedside showed that the patient's blood pressure was 200/110. The patient has had recent hospitalization for eclampsia-like symptoms and had been given labetalol. I elected to give the patient labetalol immediately in the emergency department because of her initial presentation. The patient had an ill appearance and she was diaphoretic as well as complaining of a headache and she had also had some photophobia and what she described as wavy lines in her vision. Although this could be associated with migraine type headaches could also be associated as harbingers of eclampsia. Patient was given 20 mg of labetalol IV in the emergency department. This did help with her blood pressure. Over the course of her emergency department stay the blood pressure has decreased from the initial presentation down to a 177/81 and then down to a 113/61. The patient was also found to have an elevated temperature of 103.9 this may also explain the patient's generalized body aches as well as shaking chills and rigors the patient was given Tylenol as well as Toradol to help with this discomfort and the patient's temperature has decreased down to 99. In addition the patient's tachycardia has also decreased she has gone from the 120s down to 107 and has seemed to stabilize at 79. .  The patient continues to have headache primarily anterior which she describes as throbbing which may be related to her generalized illness in the emergency department.   The patient does have a urinary tract infection as well as elevated white blood cell count. The patient's chemistries are normal.  CT scan of the patient's head is normal.  The CT scan of the head was obtained as a preliminary scan because of the patient's eclampsia history. The patient's CT scan of the abdomen shows a nephrolithiasis 4 mm on the right as well as multiple other kidney stones causing uropathy. The patient also has perinephric stranding suggesting that of an acute pyelonephritis. The presence of a right adnexal cyst has decreased since previous study and is unrelated to current condition. It appears that the patient has an acute pyelonephritis with associated nephrolithiasis. The patient's headache as well as presentation with elevated blood pressure may also be related to her history of eclampsia. In any event the patient has stabilized and her blood pressure has stabilized. The patient has received Zosyn antibiotics for broad-spectrum coverage urinary cultures have been sent. Patient's lactic acid is 1.5 she does not require a repeat lactic acid. Patient also does not require 30 mL/kg fluid bolus as she presented hypertensive. Patient's case was discussed with Dr. Kendra Mahmood who is on-call for OB they will be available for management if needed otherwise it is just going to be treatment of the patient's blood pressure on an outpatient basis or from an inpatient standpoint. The patient's case was also discussed with Dr. Cecil Strauss who is on-call for urology he will be seeing the patient for the kidney stones as well as probable stent placement in the morning. Finally, the patient's case was discussed with Dr. Kenya Schultz who will be internal medicine and general management of the patient. Patient [facility] be [facility] she is in stable condition      Final Impression    1. Acute pyelonephritis    2. History of eclampsia    3.  Nephrolithiasis      Is this patient to be included in the SEP-1 Core Measure due to severe sepsis or septic shock? Yes   SEP-1 CORE MEASURE DATA (as required)      Sepsis Criteria   Severe Sepsis Criteria   Septic Shock Criteria     Must be confirmed or suspected to move forward with diagnosis of sepsis. Must meet 2:    [x] Temperature > 100.9 F (38.3 C)        or < 96.8 F (36 C)  [x] HR > 90  [] RR > 20  [] WBC > 12 or < 4 or 10% bands      AND:      [] Infection Confirmed or        Suspected. Must meet 1:    [] Lactate > 2       or   [] Signs of Organ Dysfunction:    - SBP < 90 or MAP < 65  - Altered mental status  - Creatinine > 2 or increased from      baseline  - Urine Output < 0.5 ml/kg/hr  - Bilirubin > 2  - INR > 1.5 (not anticoagulated)  - Platelets < 479,473  - Acute Respiratory Failure as     evidenced by new need for NIPPV     or mechanical ventilation      [x] No criteria met for Severe Sepsis. Must meet 1:    [] Lactate > 4        or   [] SBP < 90 or MAP < 65 for at        least two readings in the first        hour after fluid bolus        administration      [] Vasopressors initiated (if hypotension persists after fluid resuscitation)        [] No criteria met for Septic Shock.    Patient Vitals for the past 6 hrs:   BP Temp Pulse Resp SpO2 Height Weight Weight Method Percent Weight Change   07/07/22 2038 (!) 177/81 (!) 103.9 °F (39.9 °C) (!) 107 16 98 % 5' 9\" (1.753 m) 210 lb (95.3 kg) Stated 0   07/07/22 2347 (!) 140/70 -- 90 -- -- -- -- -- --   07/07/22 2350 113/62 -- 95 -- 97 % -- -- -- --   07/08/22 0000 (!) 117/55 -- 96 16 100 % -- -- -- --   07/08/22 0030 113/62 -- 85 14 98 % -- -- -- --   07/08/22 0100 113/61 -- 79 14 96 % -- -- -- --   07/08/22 0104 -- 99 °F (37.2 °C) -- -- -- -- -- -- --      Recent Labs     07/07/22 2115   WBC 11.5*   CREATININE 0.9   BILITOT 0.5            Time no severe sepsis Identified    Fluid Resuscitation Rational: She was hypertensive possible eclampsia you want lower BP not higher    Repeat lactate level: not indicated due to initial lactate < 2    Reassessment Exam:   Not applicable. Patient does not have septic shock.          287 Jerrica Hinds DO  07/08/22 0053

## 2022-07-08 NOTE — PROGRESS NOTES
V2.0  [facility] [place]      Name:  Madeline Yang /Age/Sex: 1985  (43 y.o. female)   MRN & CSN:  [id number] & [id number] Encounter Date/Time: 2022 3:42 PM EDT    Location:  [address] [place] [address]-A PCP: Verl Opitz, [address] [place] [address] Crossing Day: 1    Assessment and Plan:   Madeline Yang is a 43 y.o. female who presented with right-sided flank pain with concern for pyelonephritis and nephrolithiasis. Plan:  1. Sepsis secondary to right-sided pyelonephritis and obstructive uropathy: CAT scan showed right-sided hydronephrosis with hydroureter secondary to a 4 mm calculus likely obstructive in the proximal right ureter associated with perinephric stranding. Urology consult in place. Urology took the patient for cystoscopy with stent placement of the right ureter. Received Toradol for appropriate pain. Urology plan is outpatient right-sided ureteroscopy and stone manipulation of the right renal and ureteral stones. Await urine cultures. Will need outpatient Bactrim for 8 more days to complete 10 days of antibiotics. Also will need NSAID for anti-inflammatory effects along with pain control effects. 2. Elevated blood pressure: Pain related      Diet Diet NPO   DVT Prophylaxis [x] Lovenox, []  Heparin, [] SCDs, [] Ambulation,  [] Eliquis, [] Xarelto  [] Coumadin   Code Status Full Code   Disposition From: home  Expected Disposition: Home  Estimated Date of Discharge: Monday  Patient requires continued admission due to post stent placement   Surrogate Decision Maker/ POA      Subjective:     Chief Complaint: No chief complaint on file. Madeline Yang is a 43 y.o. female     The patient was seen at bedside. Still has pain at the site of infection in right flank. Went for cystoscopy today. Will be planned for discharge after urology clears the patient.          Review of Systems:    Review of Systems   Constitutional: Negative for appetite change, chills, motion. Cervical back: Normal range of motion and neck supple. Right lower leg: No edema. Left lower leg: No edema. Skin:     Coloration: Skin is not jaundiced or pale. Neurological:      General: No focal deficit present. Mental Status: She is alert and oriented to person, place, and time. Mental status is at baseline.             Medications:   Medications:    sodium chloride flush  10 mL IntraVENous 2 times per day    enoxaparin  40 mg SubCUTAneous Daily    piperacillin-tazobactam  3,375 mg IntraVENous Q8H      Infusions:    sodium chloride      sodium chloride 100 mL/hr at 07/08/22 0345     PRN Meds: sodium chloride flush, 10 mL, PRN  sodium chloride, , PRN  ondansetron, 4 mg, Q8H PRN   Or  ondansetron, 4 mg, Q6H PRN  bisacodyl, 5 mg, Daily PRN  acetaminophen, 650 mg, Q6H PRN   Or  acetaminophen, 650 mg, Q6H PRN  ketorolac, 30 mg, Q6H PRN        Labs      Recent Results (from the past 24 hour(s))   CBC with Auto Differential    Collection Time: 07/07/22  9:15 PM   Result Value Ref Range    WBC 11.5 (H) 4.0 - 10.5 K/CU MM    RBC 4.88 4.2 - 5.4 M/CU MM    Hemoglobin 12.5 12.5 - 16.0 GM/DL    Hematocrit 39.5 37 - 47 %    MCV 80.9 78 - 100 FL    MCH 25.6 (L) 27 - 31 PG    MCHC 31.6 (L) 32.0 - 36.0 %    RDW 16.0 (H) 11.7 - 14.9 %    Platelets 492 529 - 852 K/CU MM    MPV 9.5 7.5 - 11.1 FL    Differential Type AUTOMATED DIFFERENTIAL     Segs Relative 82.6 (H) 36 - 66 %    Lymphocytes % 7.4 (L) 24 - 44 %    Monocytes % 8.7 (H) 0 - 4 %    Eosinophils % 0.1 0 - 3 %    Basophils % 0.2 0 - 1 %    Segs Absolute 9.5 K/CU MM    Lymphocytes Absolute 0.9 K/CU MM    Monocytes Absolute 1.0 K/CU MM    Eosinophils Absolute 0.0 K/CU MM    Basophils Absolute 0.0 K/CU MM    Immature Neutrophil % 1.0 (H) 0 - 0.43 %    Total Immature Neutrophil 0.11 K/CU MM   Comprehensive Metabolic Panel    Collection Time: 07/07/22  9:15 PM   Result Value Ref Range    Sodium 136 135 - 145 MMOL/L    Potassium 4.0 3.5 - 5.1 MMOL/L    Chloride 100 99 - 110 mMol/L    CO2 24 21 - 32 MMOL/L    BUN 9 6 - 23 MG/DL    CREATININE 0.9 0.6 - 1.1 MG/DL    Glucose 113 (H) 70 - 99 MG/DL    Calcium 9.2 8.3 - 10.6 MG/DL    Albumin 4.3 3.4 - 5.0 GM/DL    Total Protein 8.1 6.4 - 8.2 GM/DL    Total Bilirubin 0.5 0.0 - 1.0 MG/DL    ALT 22 10 - 40 U/L    AST 14 (L) 15 - 37 IU/L    Alkaline Phosphatase 88 40 - 129 IU/L    GFR Non-African American >60 >60 mL/min/1.73m2    GFR African American >60 >60 mL/min/1.73m2    Anion Gap 12 4 - 16   Urinalysis    Collection Time: 07/07/22  9:15 PM   Result Value Ref Range    Color, UA YELLOW (A) YELLOW    Clarity, UA SLIGHTLY CLOUDY (A) CLEAR    Glucose, Urine NEGATIVE NEGATIVE MG/DL    Bilirubin Urine NEGATIVE NEGATIVE MG/DL    Ketones, Urine NEGATIVE NEGATIVE MG/DL    Specific Gravity, UA 1.015 1.001 - 1.035    Blood, Urine LARGE (A) NEGATIVE    pH, Urine 6.0 5.0 - 8.0    Protein, UA TRACE (A) NEGATIVE MG/DL    Urobilinogen, Urine 0.2 0.2 - 1.0 MG/DL    Nitrite Urine, Quantitative POSITIVE (A) NEGATIVE    Leukocyte Esterase, Urine MODERATE (A) NEGATIVE    RBC, UA 8 (H) 0 - 6 /HPF    WBC, UA 40 (H) 0 - 5 /HPF    Epithelial Cells, UA FEW /HPF    Cast Type UCFP NO CAST FORMS SEEN /HPF    Bacteria, UA 2+ (A) NEGATIVE /HPF    Crystal Type NEGATIVE NEGATIVE /HPF    Urinalysis Comments HIDE    Lactic Acid    Collection Time: 07/07/22  9:15 PM   Result Value Ref Range    Lactate 1.5 0.4 - 2.0 mMOL/L   HCG Serum, Quantitative    Collection Time: 07/07/22  9:15 PM   Result Value Ref Range    hCG Quant 0.5 UIU/ML        Imaging/Diagnostics Last 24 Hours   CT ABDOMEN PELVIS WO CONTRAST    Result Date: 7/7/2022  EXAMINATION: CT OF THE ABDOMEN AND PELVIS WITHOUT CONTRAST 7/7/2022 7:56 pm TECHNIQUE: CT of the abdomen and pelvis was performed without the administration of intravenous contrast. Multiplanar reformatted images are provided for review.  Automated exposure control, iterative reconstruction, and/or weight based adjustment of the mA/kV was utilized to reduce the radiation dose to as low as reasonably achievable. COMPARISON: CT scan dated May 10, 2017 HISTORY: ORDERING SYSTEM PROVIDED HISTORY: flank pain TECHNOLOGIST PROVIDED HISTORY: Reason for exam:->flank pain Is the patient pregnant?->No Reason for Exam: right flank pain FINDINGS: Lower Chest: The lung bases are clear. Organs: The liver, spleen, pancreas, and adrenal glands demonstrate no acute abnormality. The gallbladder is surgically absent. Multiple bilateral intrarenal calculi are present. Largest calculus measures 12 mm, within the upper pole collecting system of the right kidney. 10 mm calculus is present within right renal pelvis. A mild degree of right hydronephrosis and hydroureter is present, secondary to a 4 mm calculus within the proximal ureter. Left perinephric stranding is present related to obstructive uropathy. GI/Bowel: Stomach appears normal.  Small bowel appears normal without evidence of obstruction. Patient is status post prior appendectomy. No focal inflammatory change or wall thickening is present involving the large bowel. Pelvis: Urinary bladder is incompletely distended. Uterus appears grossly normal.  Right ovarian cyst is again noted, measuring 6.5 x 4 cm in AP and transverse dimension, previously measured 7.2 x 6.2 cm. . Peritoneum/Retroperitoneum: Trace amount of free fluid is present within the left pelvis. No free air is present. No aneurysm formation noted. No pathological adenopathy is present. Bones/Soft Tissues: No acute osseous abnormality is present. 1. Mild degree of right hydronephrosis and hydroureter, secondary to a 4 mm calculus within the proximal right ureter. Right perinephric stranding is present, and may be related to obstructive uropathy or infection. Multiple additional right intrarenal calculi are present, largest measuring 12 mm 2.  Nonobstructing left intrarenal calculi largest measuring 4 mm 3. 6.5 x 4 cm right adnexal cyst, decreased since the prior study. This was evaluated previously with ultrasound 4. Prior cholecystectomy and appendectomy     CT HEAD WO CONTRAST    Result Date: 7/7/2022  EXAMINATION: CT OF THE HEAD WITHOUT CONTRAST  7/7/2022 10:53 pm TECHNIQUE: CT of the head was performed without the administration of intravenous contrast. Automated exposure control, iterative reconstruction, and/or weight based adjustment of the mA/kV was utilized to reduce the radiation dose to as low as reasonably achievable. COMPARISON: CT head without contrast June 22, 2022. HISTORY: ORDERING SYSTEM PROVIDED HISTORY: head pain TECHNOLOGIST PROVIDED HISTORY: Has a \"code stroke\" or \"stroke alert\" been called? ->No Reason for exam:->head pain Is the patient pregnant?->No Reason for Exam: head pain FINDINGS: BRAIN/VENTRICLES: There is no acute intracranial hemorrhage, mass effect or midline shift. No abnormal extra-axial fluid collection. The gray-white differentiation is maintained without evidence of an acute infarct. There is no evidence of hydrocephalus. ORBITS: The visualized portion of the orbits demonstrate no acute abnormality. SINUSES: The visualized paranasal sinuses and mastoid air cells demonstrate no acute abnormality. SOFT TISSUES/SKULL:  No acute abnormality of the visualized skull or soft tissues. No acute intracranial abnormality.        Electronically signed by Vinnie Bowers MD, MD on 7/8/2022 at 3:42 PM

## 2022-07-08 NOTE — ED NOTES
Dr Jonathan Oviedo talks with Hospitalist to get pt admitted.      Davi Tejeda, RN  07/08/22 [facility]

## 2022-07-08 NOTE — CONSULTS
Department of Urology   Consult Note  [address] 1 2 3 4 5      Date: 2022   Patient: Eugenia Aguilera   : 1985   DOA: 2022   MRN: [id number]   ROOM#: [id number]     Reason for Consult:    4mm proximal right ureter stone with obstruction  Urinary tract infection  Fevers  Acute right flank pain    Requesting Physician:  Hospitalist    CHIEF COMPLAINT:  Acute right flank pain secondary to stone in proximal right ureter- additional right renal stones                                        UTI with fevers    History Obtained From:  patient    HISTORY OF PRESENT ILLNESS:                The patient is a 43 y.o. female with significant past medical history of kidney stones developed acute right flank pain and nausea. Diagnosed with 4mm proximal right ureter stone with obstruction on CT Scan. Suspected UTI with fevers. Prior kidney stones treated by Dr. Titus Suárez. She has not been able to pass stones in the past.  Delivered baby 3 weeks ago. Pregnancy with eclampsia. Currently she is feeling well without fevers. On Zosyn. Cultures are pending. I discussed risks, benefits, and alterative therapies. I recommended cystoscopy and right ureter stent placement today with outpatient right ureteroscopy and stone manipulation of right ureter and renal stones. Stent placement only due to suspected UTI. She elected to proceed.   She has had stents in the past.     Past Medical History:        Diagnosis Date    Acute appendicitis 2012    Chemical pregnancy     Irregular menses     Kidney stones     Obesity     Pregnant     Prior pregnancy with fetal demise and current pregnancy 2007    36Wks     Past Surgical History:        Procedure Laterality Date    APPENDECTOMY      CHOLECYSTECTOMY      LAPAROSCOPIC APPENDECTOMY  04/10/12    OTHER SURGICAL HISTORY  2010    lump removed from right axilla    OTHER SURGICAL HISTORY  2010    lump removed from groin area    OTHER SURGICAL HISTORY Right 4/11/2013    Excision of lesion to R axilla    OTHER SURGICAL HISTORY Right 4/29/2013    Closure right axillary wound     Current Medications:   Current Facility-Administered Medications: sodium chloride flush 0.9 % injection 10 mL, 10 mL, IntraVENous, 2 times per day  sodium chloride flush 0.9 % injection 10 mL, 10 mL, IntraVENous, PRN  0.9 % sodium chloride infusion, , IntraVENous, PRN  enoxaparin (LOVENOX) injection 40 mg, 40 mg, SubCUTAneous, Daily  ondansetron (ZOFRAN-ODT) disintegrating tablet 4 mg, 4 mg, Oral, Q8H PRN **OR** ondansetron (ZOFRAN) injection 4 mg, 4 mg, IntraVENous, Q6H PRN  bisacodyl (DULCOLAX) EC tablet 5 mg, 5 mg, Oral, Daily PRN  acetaminophen (TYLENOL) tablet 650 mg, 650 mg, Oral, Q6H PRN **OR** acetaminophen (TYLENOL) suppository 650 mg, 650 mg, Rectal, Q6H PRN  0.9 % sodium chloride infusion, , IntraVENous, Continuous  piperacillin-tazobactam (ZOSYN) 3,375 mg in dextrose 5 % 50 mL IVPB (mini-bag), 3,375 mg, IntraVENous, Q8H    Allergies:  Patient has no known allergies. Social History:   TOBACCO:   reports that she quit smoking about 4 years ago. Her smoking use included cigarettes. She has a 15.00 pack-year smoking history. She has never used smokeless tobacco.  ETOH:   reports no history of alcohol use. DRUGS:   reports no history of drug use.   MARITAL STATUS:    OCCUPATION:      Family History:         Problem Relation Age of Onset    High Blood Pressure Father     High Cholesterol Father     Cancer Sister         Lymphoma    High Cholesterol Mother     Diabetes Paternal Aunt     Cancer Maternal Grandfather     Cancer Paternal Grandmother     Stroke Paternal Grandmother     Arthritis Paternal Grandfather        REVIEW OF SYSTEMS:        PHYSICAL EXAM:    VITALS:  BP (!) 102/55   Pulse 73   Temp 97.9 °F (36.6 °C) (Oral)   Resp 18   Ht 5' 9\" (1.753 m)   Wt 210 lb (95.3 kg)   LMP 09/14/2021   SpO2 95%   BMI 31.01 kg/m²     TEMPERATURE:  Current - Temp: 97.9 °F (36.6 °C); Max - Temp  Av.3 °F (37.9 °C)  Min: 97.9 °F (36.6 °C)  Max: 103.9 °F (39.9 °C)  24HR BLOOD PRESSURE RANGE:  Systolic (81FIF), OWB:200 , Min:102 , IOZ:083   ; Diastolic (03RLL), NDR:14, Min:55, Max:81    8HR INTAKE OUTPUT:  No intake/output data recorded. URINARY CATHETER OUTPUT (Cota):     DRAIN/TUBE OUTPUT:        CONSTITUTIONAL:  awake, alert, cooperative, no apparent distress, and appears stated age  Soft, nd/nt, obese  Mild right CVAT    Data:    WBC:    Lab Results   Component Value Date/Time    WBC 11.5 2022 09:15 PM     Hemoglobin/Hematocrit:    Lab Results   Component Value Date/Time    HGB 12.5 2022 09:15 PM    HCT 39.5 2022 09:15 PM     BMP:    Lab Results   Component Value Date/Time     2022 09:15 PM    K 4.0 2022 09:15 PM     2022 09:15 PM    CO2 24 2022 09:15 PM    BUN 9 2022 09:15 PM    LABALBU 4.3 2022 09:15 PM    CREATININE 0.9 2022 09:15 PM    CALCIUM 9.2 2022 09:15 PM    GFRAA >60 2022 09:15 PM    LABGLOM >60 2022 09:15 PM     PT/INR:  No results found for: PROTIME, INR        Imaging: [unfilled]    CT Scan a/p 2022 reviewed      Impression:   Acute right flank pain due to 4mm proximal right ureter stone- additional right renal stones  Fevers and suspected UTI  On zosyn- urine culture pending      Recommendation:   1. Cystoscopy and right ureter stent placement today  2. Continue zosyn and await urine culture results  3. Outpatient right ureteroscopy and stone manipulation of right renal and ureter stones      Patient seen and examined, chart reviewed.      Electronically signed by Catarino Briceno MD on 2022 at 6:55 AM

## 2022-07-08 NOTE — H&P
History and Physical      Name:  Rosaura Parker /Age/Sex: 1985  (43 y.o. female)   MRN & CSN:  [id number] & [id number] Encounter Date/Time: 2022 3:19 AM EDT   Location:  [facility]5/3105-A PCP: Ramin Ireland, [address] [place] [address] Crossing Day: 1    Assessment and Plan:   Rosaura Parker is a 43 y.o. female who presents with     Sepsis secondary to UTI/?pyelonephritis  right hydronephrosis and hydroureter, secondary to a 4 mm  calculus within the proximal right ureter.  Right perinephric stranding is  present, and may be related to obstructive uropathy or infection.  Multiple  additional right intrarenal calculi are present, largest measuring 12 mm  - urology consulted by ED  - NPO  - IVF  - Continue Zosyn  - Follow-up urine culture  - pain mgmt  - antiemetic      Elampsia- no seizures  - was given labetalol in ED, however BP is wnl here  -OB/GYN consulted by ED provider, recommended labetalol for the hypertension, patient's hypertension has resolved  As needed labetalol for blood pressure greater than 957 mmHg systolic, and called OB/GYN as appropriate. H/o prior cholecystectomy and appendectomy    D/w ED provider  Code status Full  DVT PPx Lovenox       History of Present Illness:     Rosaura Parker is a 43 y.o. female p/w hypertension, headaches, not feeling well. Patient start that this was related to her eclampsia so she came to the hospital.  On intake she was noted to have a fever of 103.9, with rigors, after Tylenol temperature did improve, she was given labetalol after consultation with OB/GYN for the hypertension which improved. On septic work-up she was found to have UTI with possible pyelonephritis and nephrolithiasis. Patient otherwise does not complain of any abdominal pain or flank pain, states that she currently still has a headache however she does not have high blood pressure anymore. Patient states that she does get migraines.             Review of Systems: Need 10 Elements       Pt otherwise has no complaints of CP, SOB, dizziness, N/V/C/D, abdominal pain, dysuria, joint pains, rash/boils, or cough. Objective:   No intake or output data in the 24 hours ending 07/08/22 0319   Vitals:   Vitals:    07/08/22 0309   BP: (!) 102/55   Pulse: 73   Resp: 18   Temp: 97.9 °F (36.6 °C)   TempSrc: Oral   SpO2: 95%   Weight: 210 lb (95.3 kg)   Height: 5' 9\" (1.753 m)       Medications Prior to Admission     Prior to Admission medications    Medication Sig Start Date End Date Taking? Authorizing Provider   acetaminophen (TYLENOL) 500 MG tablet Take 1,000 mg by mouth every 6 hours as needed for Pain    Historical Provider, MD   docusate sodium (COLACE) 100 MG capsule Take 1 capsule by mouth 2 times daily 6/17/22   CHELSEA Conway CNM   ibuprofen (ADVIL;MOTRIN) 800 MG tablet Take 1 tablet by mouth every 8 hours 6/17/22   CHELSEA Conway CNM   blood glucose monitor strips Test 4 times a day & as needed for symptoms of irregular blood glucose. Dispense sufficient amount for indicated testing frequency plus additional to accommodate PRN testing needs. 4/15/22   Blake Adkins MD   glucose monitoring (FREESTYLE FREEDOM) kit 1 kit by Does not apply route daily 4/15/22   Regulo Trevino MD   Lancets MISC 1 each by Does not apply route 4 times daily 4/15/22   Regulo Trevino MD   Prenatal MV-Min-FA-Omega-3 (PRENATAL GUMMIES/DHA & FA) 0.4-32.5 MG CHEW Take 1 tablet by mouth daily 11/11/21   Regulo Trevino MD       Physical Exam: Need 8 Elements   General: NAD   Eyes: EOMI  ENT: neck supple  Cardiovascular: Regular rate. NO edema  Respiratory: Symmetrical expansion, CTA  Gastrointestinal: Soft, non tender  Genitourinary: no suprapubic tenderness, no flank pain or tenderness  Skin: warm, dry  Neuro: Alert. Oriented  Psych: Mood appropriate.        Past Medical History:   PMHx   Past Medical History:   Diagnosis Date    Acute appendicitis 4/11/2012    Chemical pregnancy     Irregular menses     Kidney stones     Obesity     Pregnant     Prior pregnancy with fetal demise and current pregnancy 2007    36Wks     PSHX:  has a past surgical history that includes laparoscopic appendectomy (04/10/12); other surgical history (2010); other surgical history (2010); other surgical history (Right, 2013); Cholecystectomy; other surgical history (Right, 2013); and Appendectomy. Allergies: No Known Allergies  Fam HX:  family history includes Arthritis in her paternal grandfather; Cancer in her maternal grandfather, paternal grandmother, and sister; Diabetes in her paternal aunt; High Blood Pressure in her father; High Cholesterol in her father and mother; Stroke in her paternal grandmother. Soc HX:   Social History     Socioeconomic History    Marital status:      Spouse name: Sobeida Cantu Number of children: 1    Years of education: Not on file    Highest education level: Not on file   Occupational History    Occupation: unemployed   Tobacco Use    Smoking status: Former Smoker     Packs/day: 1.00     Years: 15.00     Pack years: 15.00     Types: Cigarettes     Quit date: 2017     Years since quittin.6    Smokeless tobacco: Never Used    Tobacco comment: quit when she found out she was pregnant   Vaping Use    Vaping Use: Never used   Substance and Sexual Activity    Alcohol use: No    Drug use: No    Sexual activity: Yes     Partners: Male   Other Topics Concern    Not on file   Social History Narrative    Do you donate blood or plasma? No    Caffeine intake? 3 can of pop    Advance directive? No    Is blood transfusion acceptable in an emergency?  Yes             Social Determinants of Health     Financial Resource Strain: Low Risk     Difficulty of Paying Living Expenses: Not very hard   Food Insecurity: Food Insecurity Present    Worried About Running Out of Food in the Last Year: Sometimes true    Eleuterio of Food in the Last Year: Sometimes true   Transportation Needs: No Transportation Needs    Lack of Transportation (Medical): No    Lack of Transportation (Non-Medical): No   Physical Activity:     Days of Exercise per Week: Not on file    Minutes of Exercise per Session: Not on file   Stress:     Feeling of Stress : Not on file   Social Connections:     Frequency of Communication with Friends and Family: Not on file    Frequency of Social Gatherings with Friends and Family: Not on file    Attends Taoism Services: Not on file    Active Member of [address] [place] [address] [place] [employer] [place] or Organizations: Not on file    Attends Club or Organization Meetings: Not on file    Marital Status: Not on file   Intimate Partner Violence:     Fear of Current or Ex-Partner: Not on file    Emotionally Abused: Not on file    Physically Abused: Not on file    Sexually Abused: Not on file   Housing Stability: High Risk    Unable to Pay for Housing in the Last Year: Yes    Number of [place] in the Last Year: Not on file    Unstable Housing in the Last Year: Yes       Medications:   Medications:    Infusions:   PRN Meds:     Labs      CBC:   Recent Labs     07/07/22 2115   WBC 11.5*   HGB 12.5        BMP:    Recent Labs     07/07/22 2115      K 4.0      CO2 24   BUN 9   CREATININE 0.9   GLUCOSE 113*     Hepatic:   Recent Labs     07/07/22 2115   AST 14*   ALT 22   BILITOT 0.5   ALKPHOS 88     Lipids: No results found for: CHOL, HDL, TRIG  Hemoglobin A1C:   Lab Results   Component Value Date/Time    LABA1C 5.3 05/10/2022 11:20 AM     TSH: No results found for: TSH  Troponin: No results found for: TROPONINT  Lactic Acid: No results for input(s): LACTA in the last 72 hours. BNP: No results for input(s): PROBNP in the last 72 hours.   UA:  Lab Results   Component Value Date/Time    NITRU POSITIVE 07/07/2022 09:15 PM    COLORU YELLOW 07/07/2022 09:15 PM    WBCUA 40 07/07/2022 09:15 PM    RBCUA 8 07/07/2022 09:15 PM    MUCUS RARE 07/26/2018 09:40 AM    TRICHOMONAS NONE SEEN 07/26/2018 09:40 AM    YEAST RARE 04/29/2018 12:04 PM    BACTERIA 2+ 07/07/2022 09:15 PM    CLARITYU SLIGHTLY CLOUDY 07/07/2022 09:15 PM    SPECGRAV 1.015 07/07/2022 09:15 PM    LEUKOCYTESUR MODERATE 07/07/2022 09:15 PM    UROBILINOGEN 0.2 07/07/2022 09:15 PM    BILIRUBINUR NEGATIVE 07/07/2022 09:15 PM    BLOODU LARGE 07/07/2022 09:15 PM    KETUA NEGATIVE 07/07/2022 09:15 PM     Urine Cultures:   Lab Results   Component Value Date/Time    LABURIN >100,000 CFU/ml 01/13/2022 10:53 AM     Blood Cultures: No results found for: BC  No results found for: BLOODCULT2  Organism:   Lab Results   Component Value Date/Time    ORG BHS Group B (Strep agalacticae) 05/24/2022 11:18 AM       Imaging/Diagnostics Last 24 Hours   CT ABDOMEN PELVIS WO CONTRAST    Result Date: 7/7/2022  EXAMINATION: CT OF THE ABDOMEN AND PELVIS WITHOUT CONTRAST 7/7/2022 7:56 pm TECHNIQUE: CT of the abdomen and pelvis was performed without the administration of intravenous contrast. Multiplanar reformatted images are provided for review. Automated exposure control, iterative reconstruction, and/or weight based adjustment of the mA/kV was utilized to reduce the radiation dose to as low as reasonably achievable. COMPARISON: CT scan dated May 10, 2017 HISTORY: ORDERING SYSTEM PROVIDED HISTORY: flank pain TECHNOLOGIST PROVIDED HISTORY: Reason for exam:->flank pain Is the patient pregnant?->No Reason for Exam: right flank pain FINDINGS: Lower Chest: The lung bases are clear. Organs: The liver, spleen, pancreas, and adrenal glands demonstrate no acute abnormality. The gallbladder is surgically absent. Multiple bilateral intrarenal calculi are present. Largest calculus measures 12 mm, within the upper pole collecting system of the right kidney. 10 mm calculus is present within right renal pelvis.   A mild degree of right hydronephrosis and hydroureter is present, secondary to a 4 mm calculus within the proximal ureter. Left perinephric stranding is present related to obstructive uropathy. GI/Bowel: Stomach appears normal.  Small bowel appears normal without evidence of obstruction. Patient is status post prior appendectomy. No focal inflammatory change or wall thickening is present involving the large bowel. Pelvis: Urinary bladder is incompletely distended. Uterus appears grossly normal.  Right ovarian cyst is again noted, measuring 6.5 x 4 cm in AP and transverse dimension, previously measured 7.2 x 6.2 cm. . Peritoneum/Retroperitoneum: Trace amount of free fluid is present within the left pelvis. No free air is present. No aneurysm formation noted. No pathological adenopathy is present. Bones/Soft Tissues: No acute osseous abnormality is present. 1. Mild degree of right hydronephrosis and hydroureter, secondary to a 4 mm calculus within the proximal right ureter. Right perinephric stranding is present, and may be related to obstructive uropathy or infection. Multiple additional right intrarenal calculi are present, largest measuring 12 mm 2. Nonobstructing left intrarenal calculi largest measuring 4 mm 3. 6.5 x 4 cm right adnexal cyst, decreased since the prior study. This was evaluated previously with ultrasound 4. Prior cholecystectomy and appendectomy     CT HEAD WO CONTRAST    Result Date: 7/7/2022  EXAMINATION: CT OF THE HEAD WITHOUT CONTRAST  7/7/2022 10:53 pm TECHNIQUE: CT of the head was performed without the administration of intravenous contrast. Automated exposure control, iterative reconstruction, and/or weight based adjustment of the mA/kV was utilized to reduce the radiation dose to as low as reasonably achievable. COMPARISON: CT head without contrast June 22, 2022. HISTORY: ORDERING SYSTEM PROVIDED HISTORY: head pain TECHNOLOGIST PROVIDED HISTORY: Has a \"code stroke\" or \"stroke alert\" been called? ->No Reason for exam:->head pain Is the patient pregnant?->No Reason for Exam: head pain FINDINGS: BRAIN/VENTRICLES: There is no acute intracranial hemorrhage, mass effect or midline shift. No abnormal extra-axial fluid collection. The gray-white differentiation is maintained without evidence of an acute infarct. There is no evidence of hydrocephalus. ORBITS: The visualized portion of the orbits demonstrate no acute abnormality. SINUSES: The visualized paranasal sinuses and mastoid air cells demonstrate no acute abnormality. SOFT TISSUES/SKULL:  No acute abnormality of the visualized skull or soft tissues. No acute intracranial abnormality.            Electronically signed by Heidy Pandya MD on 7/8/2022 at 3:19 AM

## 2022-07-08 NOTE — ANESTHESIA POSTPROCEDURE EVALUATION
Department of Anesthesiology  Postprocedure Note    Patient: Laurey Dancer  MRN: [id number]  YOB: 1985  Date of evaluation: 7/8/2022      Procedure Summary     Date: 07/08/22 Room / Location: [address]    Anesthesia Start: 3610 Anesthesia Stop: 7710    Procedure: CYSTOSCOPY RIGHT  RETROGRADE PYELOGRAM STENT INSERTION (Right ) Diagnosis:       Pyelonephritis      (Pyelonephritis [N12])    Surgeons: Warren Abdul MD Responsible Provider: Peter Farah MD    Anesthesia Type: MAC ASA Status: 2          Anesthesia Type: MAC    Veronica Phase I:      Veronica Phase II:        Anesthesia Post Evaluation    Patient location during evaluation: bedside  Patient participation: complete - patient participated  Level of consciousness: sleepy but conscious and awake  Pain score: 1  Airway patency: patent  Nausea & Vomiting: no nausea and no vomiting  Complications: no  Cardiovascular status: blood pressure returned to baseline and hemodynamically stable  Respiratory status: acceptable  Hydration status: stable  Multimodal analgesia pain management approach

## 2022-07-08 NOTE — DISCHARGE INSTR - DIET

## 2022-07-08 NOTE — PLAN OF CARE
Problem: Pain  Goal: Verbalizes/displays adequate comfort level or baseline comfort level  Outcome: Progressing     Problem: Chronic Conditions and Co-morbidities  Goal: Patient's chronic conditions and co-morbidity symptoms are monitored and maintained or improved  Outcome: Progressing  Flowsheets (Taken 7/8/2022 0407)  Care Plan - Patient's Chronic Conditions and Co-Morbidity Symptoms are Monitored and Maintained or Improved:   Monitor and assess patient's chronic conditions and comorbid symptoms for stability, deterioration, or improvement   Collaborate with multidisciplinary team to address chronic and comorbid conditions and prevent exacerbation or deterioration   Update acute care plan with appropriate goals if chronic or comorbid symptoms are exacerbated and prevent overall improvement and discharge

## 2022-07-08 NOTE — PROGRESS NOTES
Outpatient Pharmacy Progress Note for Meds-to-Beds    Total number of Prescriptions Filled: 2  The following medications were dispensed to the patient during the discharge process:   Ondansetron 4mg ODT   Sulfamethoxazole/Trimethorprim 800-160mg    Additional Documentation:   Medications delivered to patient's RN Cameron Acosta. Thank you for letting us serve your patients.   [facility]    [address], [address] [facility]    Phone: [phone]    Fax: [phone]

## 2022-07-08 NOTE — DISCHARGE SUMMARY
V2.0  Discharge Summary    Name:  Leticia Villalba /Age/Sex: 1985 (43 y.o. female)   Admit Date: 2022  Discharge Date: 22    MRN & CSN:  [id number] & [id number] Encounter Date and Time 22 12:59 PM EDT    Attending:  Nayely Gilman MD Discharging Provider: Nayely Gilman MD, MD       Hospital Course:     Brief HPI: Leticia Villalba is a 43 y.o. female who presented with right-sided flank pain with concern for pyelonephritis and nephrolithiasis. Brief Problem Based Course:   1. Sepsis secondary to right-sided pyelonephritis and obstructive uropathy: CAT scan showed right-sided hydronephrosis with hydroureter secondary to a 4 mm calculus likely obstructive in the proximal right ureter associated with perinephric stranding. Urology consult in place. Urology took the patient for cystoscopy with stent placement of the right ureter. Received Toradol for appropriate pain. Urology plan is outpatient right-sided ureteroscopy and stone manipulation of the right renal and ureteral stones. Await urine cultures. Will need outpatient Bactrim for 8 more days to complete 10 days of antibiotics. Also will need NSAID for anti-inflammatory effects along with pain control effects. 2. Elevated blood pressure: Pain related      The patient expressed appropriate understanding of, and agreement with the discharge recommendations, medications, and plan.      Consults this admission:  Urology      Discharge Diagnosis:   Pyelonephritis    Acute pyelonephritis  Obstructive uropathy    Discharge Instruction:   Follow up appointments: Urology and primary care physician  Primary care physician: TERI Haynes within 2 weeks  Diet: regular diet   Activity: activity as tolerated  Disposition: Discharged to:   [x]Home, []C, []SNF, []Acute Rehab, []Hospice   Condition on discharge: Stable  Labs and Tests to be Followed up as an outpatient by PCP or Specialist: Urine culture    Discharge Medications:        Medication List      START taking these medications    ondansetron 4 MG disintegrating tablet  Commonly known as: ZOFRAN-ODT  Take 1 tablet by mouth every 8 hours as needed for Nausea or Vomiting     sulfamethoxazole-trimethoprim 800-160 MG per tablet  Commonly known as: BACTRIM DS;SEPTRA DS  Take 1 tablet by mouth 2 times daily for 8 days        CONTINUE taking these medications    acetaminophen 500 MG tablet  Commonly known as: TYLENOL     blood glucose test strips  Test 4 times a day & as needed for symptoms of irregular blood glucose. Dispense sufficient amount for indicated testing frequency plus additional to accommodate PRN testing needs. docusate sodium 100 MG capsule  Commonly known as: COLACE  Take 1 capsule by mouth 2 times daily     glucose monitoring kit  1 kit by Does not apply route daily     ibuprofen 800 MG tablet  Commonly known as: ADVIL;MOTRIN  Take 1 tablet by mouth every 8 hours     Lancets Misc  1 each by Does not apply route 4 times daily     Prenatal Gummies/DHA & FA 0.4-32.5 MG Chew  Take 1 tablet by mouth daily           Where to Get Your Medications      These medications were sent to [address] [place] [address], [address] [facility] [address]    Phone: [phone]   · ondansetron 4 MG disintegrating tablet  · sulfamethoxazole-trimethoprim 800-160 MG per tablet        Objective Findings at Discharge:   /78   Pulse 95   Temp (!) 101.3 °F (38.5 °C) (Oral)   Resp 13   Ht 5' 9\" (1.753 m)   Wt 210 lb (95.3 kg)   LMP 09/14/2021   SpO2 95%   BMI 31.01 kg/m²       Physical Exam:   Physical Exam  Vitals reviewed. Constitutional:       Appearance: Normal appearance. She is normal weight. HENT:      Head: Normocephalic.       Nose: Nose normal.      Mouth/Throat:      Mouth: Mucous membranes are moist.   Eyes:      Conjunctiva/sclera: Conjunctivae normal. Pupils: Pupils are equal, round, and reactive to light. Cardiovascular:      Rate and Rhythm: Normal rate and regular rhythm. Pulses: Normal pulses. Heart sounds: Normal heart sounds. No murmur heard. Pulmonary:      Effort: Pulmonary effort is normal.      Breath sounds: Normal breath sounds. No wheezing, rhonchi or rales. Abdominal:      General: Abdomen is flat. Bowel sounds are normal. There is no distension. Palpations: Abdomen is soft. Tenderness: There is no abdominal tenderness. There is right CVA tenderness. Musculoskeletal:         General: No deformity. Normal range of motion. Cervical back: Normal range of motion and neck supple. Right lower leg: No edema. Left lower leg: No edema. Skin:     Coloration: Skin is not jaundiced or pale. Neurological:      General: No focal deficit present. Mental Status: She is alert and oriented to person, place, and time. Mental status is at baseline. Labs and Imaging   No results found. CBC:   Recent Labs     07/07/22 2115   WBC 11.5*   HGB 12.5        BMP:    Recent Labs     07/07/22 2115      K 4.0      CO2 24   BUN 9   CREATININE 0.9   GLUCOSE 113*     Hepatic:   Recent Labs     07/07/22 2115   AST 14*   ALT 22   BILITOT 0.5   ALKPHOS 88     Lipids: No results found for: CHOL, HDL, TRIG  Hemoglobin A1C:   Lab Results   Component Value Date/Time    LABA1C 5.3 05/10/2022 11:20 AM     TSH: No results found for: TSH  Troponin: No results found for: TROPONINT  Lactic Acid: No results for input(s): LACTA in the last 72 hours. BNP: No results for input(s): PROBNP in the last 72 hours.   UA:  Lab Results   Component Value Date/Time    NITRU POSITIVE 07/07/2022 09:15 PM    COLORU YELLOW 07/07/2022 09:15 PM    WBCUA 40 07/07/2022 09:15 PM    RBCUA 8 07/07/2022 09:15 PM    MUCUS RARE 07/26/2018 09:40 AM    TRICHOMONAS NONE SEEN 07/26/2018 09:40 AM    YEAST RARE 04/29/2018 12:04 PM BACTERIA 2+ 07/07/2022 09:15 PM    CLARITYU SLIGHTLY CLOUDY 07/07/2022 09:15 PM    SPECGRAV 1.015 07/07/2022 09:15 PM    LEUKOCYTESUR MODERATE 07/07/2022 09:15 PM    UROBILINOGEN 0.2 07/07/2022 09:15 PM    BILIRUBINUR NEGATIVE 07/07/2022 09:15 PM    BLOODU LARGE 07/07/2022 09:15 PM    KETUA NEGATIVE 07/07/2022 09:15 PM     Urine Cultures:   Lab Results   Component Value Date/Time    LABURIN >100,000 CFU/ml 01/13/2022 10:53 AM     Blood Cultures: No results found for: BC  No results found for: BLOODCULT2  Organism:   Lab Results   Component Value Date/Time    ORG BHS Group B (Strep agalacticae) 05/24/2022 11:18 AM       Time Spent Discharging patient 35 minutes    Electronically signed by Yahir De La Torre MD, MD on 7/8/2022 at 12:59 PM

## 2022-07-08 NOTE — PLAN OF CARE
Problem: Discharge Planning  Goal: Discharge to home or other facility with appropriate resources  Outcome: Progressing  Flowsheets  Taken 7/8/2022 0407 by Simone Merino RN  Discharge to home or other facility with appropriate resources:   Identify barriers to discharge with patient and caregiver   Arrange for needed discharge resources and transportation as appropriate   Identify discharge learning needs (meds, wound care, etc)   Refer to discharge planning if patient needs post-hospital services based on physician order or complex needs related to functional status, cognitive ability or social support system  Taken 7/8/2022 0402 by Simone Merino RN  Discharge to home or other facility with appropriate resources:   Identify barriers to discharge with patient and caregiver   Identify discharge learning needs (meds, wound care, etc)   Refer to discharge planning if patient needs post-hospital services based on physician order or complex needs related to functional status, cognitive ability or social support system   Arrange for needed discharge resources and transportation as appropriate   Arrange for interpreters to assist at discharge as needed     Problem: Pain  Goal: Verbalizes/displays adequate comfort level or baseline comfort level  7/8/2022 0918 by Milton Castano RN  Outcome: Progressing  7/8/2022 0419 by Simone Merino RN  Outcome: Progressing     Problem: Chronic Conditions and Co-morbidities  Goal: Patient's chronic conditions and co-morbidity symptoms are monitored and maintained or improved  7/8/2022 0918 by Milton Castano RN  Outcome: Progressing  7/8/2022 0419 by Simone Merino RN  Outcome: Progressing  Flowsheets (Taken 7/8/2022 0407)  Care Plan - Patient's Chronic Conditions and Co-Morbidity Symptoms are Monitored and Maintained or Improved:   Monitor and assess patient's chronic conditions and comorbid symptoms for stability, deterioration, or improvement   Collaborate with multidisciplinary team to address chronic and comorbid conditions and prevent exacerbation or deterioration   Update acute care plan with appropriate goals if chronic or comorbid symptoms are exacerbated and prevent overall improvement and discharge

## 2022-07-08 NOTE — ANESTHESIA PRE PROCEDURE
Department of Anesthesiology  Preprocedure Note       Name:  Jeannine Vivar   Age:  43 y.o.  :  1985                                          MRN:  [id number]         Date:  2022      Surgeon: Royer Valdivia):  Jorge Bellamy MD    Procedure: Procedure(s):  CYSTOSCOPY RIGHT  RETROGRADE PYELOGRAM STENT INSERTION    Medications prior to admission:   Prior to Admission medications    Medication Sig Start Date End Date Taking? Authorizing Provider   ondansetron (ZOFRAN-ODT) 4 MG disintegrating tablet Take 1 tablet by mouth every 8 hours as needed for Nausea or Vomiting 22  aCrroll Bland MD   sulfamethoxazole-trimethoprim (BACTRIM DS;SEPTRA DS) 800-160 MG per tablet Take 1 tablet by mouth 2 times daily for 8 days 22  Carroll Bland MD   acetaminophen (TYLENOL) 500 MG tablet Take 1,000 mg by mouth every 6 hours as needed for Pain    Historical Provider, MD   docusate sodium (COLACE) 100 MG capsule Take 1 capsule by mouth 2 times daily 22   Nonda Ng, APRN - CNM   ibuprofen (ADVIL;MOTRIN) 800 MG tablet Take 1 tablet by mouth every 8 hours 22   Nonda Ng, APRN - CNM   blood glucose monitor strips Test 4 times a day & as needed for symptoms of irregular blood glucose. Dispense sufficient amount for indicated testing frequency plus additional to accommodate PRN testing needs. 4/15/22   Blake Zamudio MD   glucose monitoring (FREESTYLE FREEDOM) kit 1 kit by Does not apply route daily 4/15/22   Leslee Peñaloza MD   Lancets MISC 1 each by Does not apply route 4 times daily 4/15/22   Leslee Peñaloza MD   Prenatal MV-Min-FA-Omega-3 (PRENATAL GUMMIES/DHA & FA) 0.4-32.5 MG CHEW Take 1 tablet by mouth daily 21   Leslee Peñaloza MD       Current medications:    No current facility-administered medications for this visit.      Current Outpatient Medications   Medication Sig Dispense Refill    ondansetron (ZOFRAN-ODT) 4 MG disintegrating tablet Take 1 tablet by mouth every 8 hours as needed for Nausea or Vomiting 90 tablet 0    sulfamethoxazole-trimethoprim (BACTRIM DS;SEPTRA DS) 800-160 MG per tablet Take 1 tablet by mouth 2 times daily for 8 days 16 tablet 0     Facility-Administered Medications Ordered in Other Visits   Medication Dose Route Frequency Provider Last Rate Last Admin    sodium chloride flush 0.9 % injection 10 mL  10 mL IntraVENous 2 times per day Hallie CORONADO MD   10 mL at 22 0809    sodium chloride flush 0.9 % injection 10 mL  10 mL IntraVENous PRN Ger Awan MD        0.9 % sodium chloride infusion   IntraVENous PRN Ger Awan MD        enoxaparin (LOVENOX) injection 40 mg  40 mg SubCUTAneous Daily Ger CORONADO MD   40 mg at 22 0809    ondansetron (ZOFRAN-ODT) disintegrating tablet 4 mg  4 mg Oral Q8H PRN Ger Awan MD        Or    ondansetron (ZOFRAN) injection 4 mg  4 mg IntraVENous Q6H PRN Ger Awan MD        bisacodyl (DULCOLAX) EC tablet 5 mg  5 mg Oral Daily PRN Ger Awan MD        acetaminophen (TYLENOL) tablet 650 mg  650 mg Oral Q6H PRN Ger Awan MD   650 mg at 22 1258    Or    acetaminophen (TYLENOL) suppository 650 mg  650 mg Rectal Q6H PRN Ger Awan MD        0.9 % sodium chloride infusion   IntraVENous Continuous Gersadie Awan  mL/hr at 22 0345 New Bag at 22 0345    piperacillin-tazobactam (ZOSYN) 3,375 mg in dextrose 5 % 50 mL IVPB (mini-bag)  3,375 mg IntraVENous Q8H Modesto Vera MD   Stopped at 22 1230    ketorolac (TORADOL) injection 30 mg  30 mg IntraVENous Q6H PRN Carroll Bland MD   30 mg at 22 1134       Allergies:  No Known Allergies    Problem List:    Patient Active Problem List   Diagnosis Code    Acute appendicitis K35.80    Dehiscence of surgical wound T81. 31XA    Chronic hypertension complicating or reason for care during pregnancy, second trimester O10.912     death in prior pregnancy, currently pregnant, second trimester O09.292    Labor and delivery indication for care or intervention O75.9    Obesity affecting pregnancy in third trimester, antepartum O99.213    History of stillbirth in currently pregnant patient, third trimester O0.36    GDM, class A1 O24.410    Gestational diabetes mellitus (GDM), delivered O22.26    Elderly multigravida delivered O12.46    Single liveborn, born in hospital, delivered by vaginal delivery Z38.00    44 weeks gestation of pregnancy Z3A.39    Encounter for elective induction of labor Z34.90    Preeclampsia in postpartum period O14.95    Pre-eclampsia in third trimester O14.93    Pyelonephritis N12    Nephrolithiasis N20.0       Past Medical History:        Diagnosis Date    Acute appendicitis 2012    Chemical pregnancy     Irregular menses     Kidney stones     Obesity     Pregnant     Prior pregnancy with fetal demise and current pregnancy 2007    36Wks       Past Surgical History:        Procedure Laterality Date    APPENDECTOMY      CHOLECYSTECTOMY      LAPAROSCOPIC APPENDECTOMY  04/10/12    OTHER SURGICAL HISTORY  2010    lump removed from right axilla    OTHER SURGICAL HISTORY  2010    lump removed from groin area    OTHER SURGICAL HISTORY Right 2013    Excision of lesion to R axilla    OTHER SURGICAL HISTORY Right 2013    Closure right axillary wound       Social History:    Social History     Tobacco Use    Smoking status: Former Smoker     Packs/day: 1.00     Years: 15.00     Pack years: 15.00     Types: Cigarettes     Quit date: 2017     Years since quittin.6    Smokeless tobacco: Never Used    Tobacco comment: quit when she found out she was pregnant   Substance Use Topics    Alcohol use: No                                Counseling given: Not Answered  Comment: quit when she found out she was pregnant      Vital Signs (Current): There were no vitals filed for this visit. BP Readings from Last 3 Encounters:   07/08/22 127/78   06/23/22 (!) 141/90   06/22/22 129/81       NPO Status:                                                                                 BMI:   Wt Readings from Last 3 Encounters:   07/08/22 210 lb (95.3 kg)   06/22/22 210 lb (95.3 kg)   06/16/22 230 lb (104.3 kg)     There is no height or weight on file to calculate BMI.    CBC:   Lab Results   Component Value Date/Time    WBC 11.5 07/07/2022 09:15 PM    RBC 4.88 07/07/2022 09:15 PM    HGB 12.5 07/07/2022 09:15 PM    HCT 39.5 07/07/2022 09:15 PM    MCV 80.9 07/07/2022 09:15 PM    RDW 16.0 07/07/2022 09:15 PM     07/07/2022 09:15 PM       CMP:   Lab Results   Component Value Date/Time     07/07/2022 09:15 PM    K 4.0 07/07/2022 09:15 PM     07/07/2022 09:15 PM    CO2 24 07/07/2022 09:15 PM    BUN 9 07/07/2022 09:15 PM    CREATININE 0.9 07/07/2022 09:15 PM    GFRAA >60 07/07/2022 09:15 PM    LABGLOM >60 07/07/2022 09:15 PM    GLUCOSE 113 07/07/2022 09:15 PM    PROT 8.1 07/07/2022 09:15 PM    CALCIUM 9.2 07/07/2022 09:15 PM    BILITOT 0.5 07/07/2022 09:15 PM    ALKPHOS 88 07/07/2022 09:15 PM    AST 14 07/07/2022 09:15 PM    ALT 22 07/07/2022 09:15 PM       POC Tests: No results for input(s): POCGLU, POCNA, POCK, POCCL, POCBUN, POCHEMO, POCHCT in the last 72 hours.     Coags: No results found for: PROTIME, INR, APTT    HCG (If Applicable):   Lab Results   Component Value Date    PREGTESTUR positive 11/11/2021        ABGs: No results found for: PHART, PO2ART, COC9DZE, LUS0DFZ, BEART, Q3QGNRRB     Type & Screen (If Applicable):  No results found for: LABABO, LABRH    Drug/Infectious Status (If Applicable):  No results found for: HIV, HEPCAB    COVID-19 Screening (If Applicable): No results found for: COVID19        Anesthesia Evaluation  Patient summary reviewed no history of anesthetic complications:   Airway: Mallampati: I  TM distance: >3 FB   Neck ROM: full  Mouth opening: > = 3 FB   Dental: normal exam         Pulmonary:normal exam                               Cardiovascular:  Exercise tolerance: good (>4 METS),   (+) hypertension:,         Rhythm: regular  Rate: abnormal                    Neuro/Psych:               GI/Hepatic/Renal:   (+) renal disease: kidney stones,           Endo/Other:    (+) DiabetesType II DM, , .                 Abdominal:             Vascular: Other Findings:             Anesthesia Plan      MAC     ASA 2     ( Pre Anesthesia Assessment complete. Chart reviewed on 7/8/2022)        Anesthetic plan and risks discussed with patient.                         Rai Hurst MD   7/8/2022

## 2022-07-08 NOTE — CARE COORDINATION
CM met with pt to initiate discharge planning. Role of CM explained. Pt has insurance and is able to afford medication. Pt has PCP. She is independent, drives and has reliable transportation. Plan home. No needs at this time. CM contact information given to pt. CM team available as needs arise.

## 2022-07-08 NOTE — ED NOTES
Report given to Payal SCHROEDER for [phone] at [facility].      Kacie Patton RN  07/08/22 7136

## 2022-07-08 NOTE — BRIEF OP NOTE
Brief Postoperative Note      Patient: Pawel Gleason  YOB: 1985  MRN: [id number]    Date of Procedure: 7/8/2022    Pre-Op Diagnosis: Pyelonephritis [N12]    Post-Op Diagnosis: Same       Procedure(s):  CYSTOSCOPY RIGHT  RETROGRADE PYELOGRAM STENT INSERTION    Surgeon(s):  Olivia Roberson MD    Assistant:  * No surgical staff found *    Anesthesia: General    Estimated Blood Loss (mL): Minimal    Complications: None    Specimens:   * No specimens in log *    Implants:  Implant Name Type Inv.  Item Serial No.  Lot No. LRB No. Used Action   STENT URET 4.8FR [id number] PERCFLX HYDR+ SFT PGTL TAPR TIP - [id number]  STENT URET 4.8FR [id number] PERCFLX HYDR+ SFT PGTL TAPR TIP  [employer]- [id number] Right 1 Implanted         Drains:   [REMOVED] Urinary Catheter Coude (Removed)   $ Urethral catheter insertion $ Not inserted for procedure 06/16/22 0002   Catheter Indications Urinary retention (acute or chronic), continuous bladder irrigation or bladder outlet obstruction 06/16/22 0002   Urine Color Yellow 06/16/22 0002   Urine Appearance Clear 06/16/22 0002   Output (mL) 250 mL 06/16/22 0002       Findings: 4mm right UPJ stone with pyohydronephrosis    Electronically signed by Olivia Roberson MD on 7/8/2022 at 3:18 PM

## 2022-07-09 LAB
ANION GAP SERPL CALCULATED.3IONS-SCNC: 10 MMOL/L (ref 4–16)
BUN BLDV-MCNC: 11 MG/DL (ref 6–23)
CALCIUM SERPL-MCNC: 8.1 MG/DL (ref 8.3–10.6)
CHLORIDE BLD-SCNC: 103 MMOL/L (ref 99–110)
CO2: 23 MMOL/L (ref 21–32)
CREAT SERPL-MCNC: 0.7 MG/DL (ref 0.6–1.1)
GFR AFRICAN AMERICAN: >60 ML/MIN/1.73M2
GFR NON-AFRICAN AMERICAN: >60 ML/MIN/1.73M2
GLUCOSE BLD-MCNC: 106 MG/DL (ref 70–99)
HCT VFR BLD CALC: 32.5 % (ref 37–47)
HEMOGLOBIN: 9.9 GM/DL (ref 12.5–16)
LACTATE DEHYDROGENASE: 141 IU/L (ref 120–246)
MCH RBC QN AUTO: 25.3 PG (ref 27–31)
MCHC RBC AUTO-ENTMCNC: 30.5 % (ref 32–36)
MCV RBC AUTO: 83.1 FL (ref 78–100)
PDW BLD-RTO: 15.9 % (ref 11.7–14.9)
PLATELET # BLD: 139 K/CU MM (ref 140–440)
PMV BLD AUTO: 10.3 FL (ref 7.5–11.1)
POTASSIUM SERPL-SCNC: 3.7 MMOL/L (ref 3.5–5.1)
PROCALCITONIN: 0.2
RBC # BLD: 3.91 M/CU MM (ref 4.2–5.4)
SODIUM BLD-SCNC: 136 MMOL/L (ref 135–145)
WBC # BLD: 8.3 K/CU MM (ref 4–10.5)

## 2022-07-09 PROCEDURE — 87635 SARS-COV-2 COVID-19 AMP PRB: CPT

## 2022-07-09 PROCEDURE — 85027 COMPLETE CBC AUTOMATED: CPT

## 2022-07-09 PROCEDURE — 2580000003 HC RX 258: Performed by: SURGERY

## 2022-07-09 PROCEDURE — 94761 N-INVAS EAR/PLS OXIMETRY MLT: CPT

## 2022-07-09 PROCEDURE — 80048 BASIC METABOLIC PNL TOTAL CA: CPT

## 2022-07-09 PROCEDURE — 6370000000 HC RX 637 (ALT 250 FOR IP)

## 2022-07-09 PROCEDURE — 87040 BLOOD CULTURE FOR BACTERIA: CPT

## 2022-07-09 PROCEDURE — 6360000002 HC RX W HCPCS: Performed by: INTERNAL MEDICINE

## 2022-07-09 PROCEDURE — 2140000000 HC CCU INTERMEDIATE R&B

## 2022-07-09 PROCEDURE — 84145 PROCALCITONIN (PCT): CPT

## 2022-07-09 PROCEDURE — 36415 COLL VENOUS BLD VENIPUNCTURE: CPT

## 2022-07-09 PROCEDURE — 85025 COMPLETE CBC W/AUTO DIFF WBC: CPT

## 2022-07-09 PROCEDURE — 2580000003 HC RX 258: Performed by: INTERNAL MEDICINE

## 2022-07-09 PROCEDURE — 6360000002 HC RX W HCPCS: Performed by: SURGERY

## 2022-07-09 PROCEDURE — 83615 LACTATE (LD) (LDH) ENZYME: CPT

## 2022-07-09 PROCEDURE — 6370000000 HC RX 637 (ALT 250 FOR IP): Performed by: HOSPITALIST

## 2022-07-09 RX ORDER — IBUPROFEN 800 MG/1
800 TABLET ORAL EVERY 6 HOURS PRN
Status: DISCONTINUED | OUTPATIENT
Start: 2022-07-09 | End: 2022-07-10 | Stop reason: HOSPADM

## 2022-07-09 RX ORDER — IBUPROFEN 800 MG/1
TABLET ORAL
Status: COMPLETED
Start: 2022-07-09 | End: 2022-07-09

## 2022-07-09 RX ADMIN — VANCOMYCIN HYDROCHLORIDE 2000 MG: 1 INJECTION, POWDER, LYOPHILIZED, FOR SOLUTION INTRAVENOUS at 04:45

## 2022-07-09 RX ADMIN — BUTALBITAL, ACETAMINOPHEN AND CAFFEINE 1 TABLET: 50; 325; 40 TABLET ORAL at 23:51

## 2022-07-09 RX ADMIN — PIPERACILLIN AND TAZOBACTAM 3375 MG: 3; .375 INJECTION, POWDER, LYOPHILIZED, FOR SOLUTION INTRAVENOUS at 23:54

## 2022-07-09 RX ADMIN — IBUPROFEN 800 MG: 800 TABLET, FILM COATED ORAL at 02:10

## 2022-07-09 RX ADMIN — ENOXAPARIN SODIUM 40 MG: 100 INJECTION SUBCUTANEOUS at 08:40

## 2022-07-09 RX ADMIN — BUTALBITAL, ACETAMINOPHEN AND CAFFEINE 1 TABLET: 50; 325; 40 TABLET ORAL at 17:07

## 2022-07-09 RX ADMIN — SODIUM CHLORIDE: 9 INJECTION, SOLUTION INTRAVENOUS at 15:11

## 2022-07-09 RX ADMIN — SODIUM CHLORIDE, PRESERVATIVE FREE 10 ML: 5 INJECTION INTRAVENOUS at 20:18

## 2022-07-09 RX ADMIN — IBUPROFEN 800 MG: 800 TABLET, FILM COATED ORAL at 15:39

## 2022-07-09 RX ADMIN — VANCOMYCIN HYDROCHLORIDE 1000 MG: 1 INJECTION, POWDER, LYOPHILIZED, FOR SOLUTION INTRAVENOUS at 17:01

## 2022-07-09 RX ADMIN — PIPERACILLIN AND TAZOBACTAM 3375 MG: 3; .375 INJECTION, POWDER, LYOPHILIZED, FOR SOLUTION INTRAVENOUS at 08:40

## 2022-07-09 RX ADMIN — PIPERACILLIN AND TAZOBACTAM 3375 MG: 3; .375 INJECTION, POWDER, LYOPHILIZED, FOR SOLUTION INTRAVENOUS at 15:36

## 2022-07-09 RX ADMIN — SODIUM CHLORIDE, PRESERVATIVE FREE 10 ML: 5 INJECTION INTRAVENOUS at 08:40

## 2022-07-09 ASSESSMENT — ENCOUNTER SYMPTOMS
BACK PAIN: 0
NAUSEA: 0
SINUS PAIN: 0
CHEST TIGHTNESS: 0
VOICE CHANGE: 0
VOMITING: 0
DIARRHEA: 0
BLOOD IN STOOL: 0
EYE DISCHARGE: 0
COUGH: 0
SHORTNESS OF BREATH: 0
EYE PAIN: 0
ABDOMINAL PAIN: 0
SINUS PRESSURE: 0

## 2022-07-09 ASSESSMENT — PAIN DESCRIPTION - DESCRIPTORS
DESCRIPTORS: ACHING;THROBBING
DESCRIPTORS: ACHING

## 2022-07-09 ASSESSMENT — PAIN SCALES - GENERAL
PAINLEVEL_OUTOF10: 10
PAINLEVEL_OUTOF10: 7
PAINLEVEL_OUTOF10: 5
PAINLEVEL_OUTOF10: 7
PAINLEVEL_OUTOF10: 2

## 2022-07-09 ASSESSMENT — PAIN DESCRIPTION - ONSET
ONSET: PROGRESSIVE
ONSET: PROGRESSIVE

## 2022-07-09 ASSESSMENT — PAIN DESCRIPTION - LOCATION
LOCATION: HEAD

## 2022-07-09 ASSESSMENT — PAIN - FUNCTIONAL ASSESSMENT: PAIN_FUNCTIONAL_ASSESSMENT: ACTIVITIES ARE NOT PREVENTED

## 2022-07-09 ASSESSMENT — PAIN DESCRIPTION - PAIN TYPE
TYPE: ACUTE PAIN
TYPE: ACUTE PAIN

## 2022-07-09 ASSESSMENT — PAIN DESCRIPTION - ORIENTATION
ORIENTATION: MID
ORIENTATION: OTHER (COMMENT)

## 2022-07-09 ASSESSMENT — PAIN DESCRIPTION - FREQUENCY
FREQUENCY: INTERMITTENT
FREQUENCY: INTERMITTENT

## 2022-07-09 NOTE — PROGRESS NOTES
Patients temperature went to 102.7 oral. Patient was placed in a cool shower an a chair. Patients headache was minimal comparatively to earlier, prior to shower. When patient came out of the shower her headache was felling like it was \" gonna pop my head off \". Patient was returned to bed lying flat. Patients temperature was rechecked and found to be 103.1. MD was notified, see new orders.

## 2022-07-09 NOTE — PROGRESS NOTES
V2.0  [facility] Hospitalist Progress Note      Name:  Titus Vargas /Age/Sex: 1985  (43 y.o. female)   MRN & CSN:  [id number] & [id number] Encounter Date/Time: 2022 3:42 PM EDT    Location:  [address] [place] [address]-A PCP: Destiney Leon, [address] Day: 2    Assessment and Plan:   Titus Vargas is a 43 y.o. female who presented with right-sided flank pain with concern for pyelonephritis and nephrolithiasis. Plan:  1. Sepsis secondary to right-sided pyelonephritis and obstructive uropathy: CAT scan showed right-sided hydronephrosis with hydroureter secondary to a 4 mm calculus likely obstructive in the proximal right ureter associated with perinephric stranding. Urology consult in place. Urology took the patient for cystoscopy with stent placement of the right ureter. Received Toradol for appropriate pain. Urology plan is outpatient right-sided ureteroscopy and stone manipulation of the right renal and ureteral stones. Await urine cultures. Will need outpatient Bactrim for 7 more days to complete 10 days of antibiotics. Also will need NSAID for anti-inflammatory effects along with pain control effects. Doing well overall. As per urology await urine culture before discharge. 1 nonspecific temperature spike overnight. 2. Positive Staph blood culture: positive 1/ bottle will wait for final results. 3. Elevated blood pressure: Pain related, getting better      Diet ADULT DIET; Regular   DVT Prophylaxis [x] Lovenox, []  Heparin, [] SCDs, [] Ambulation,  [] Eliquis, [] Xarelto  [] Coumadin   Code Status Full Code   Disposition From: home  Expected Disposition: Home  Estimated Date of Discharge: tomorrow  Patient requires continued admission due to wait UCx   Surrogate Decision Maker/ POA      Subjective:     Chief Complaint: No chief complaint on file. Titus Vargas is a 43 y.o. female     The patient was seen at bedside.  S/p cystoscopy and stent placed in ureter. Doing well. Has one random temp spike overnight but no tachycardia chills associated with it to make an actual febrile spell. Will monitor and await urine culture as per urology prior to discharge. Doing well overall. Denies any acute symptoms. Had 1 BM overnight. Good urine output. Review of Systems:    Review of Systems   Constitutional: Negative for appetite change, chills, fever and unexpected weight change. HENT: Negative for ear pain, hearing loss, sinus pressure, sinus pain and voice change. Eyes: Negative for pain, discharge and visual disturbance. Respiratory: Negative for cough, chest tightness and shortness of breath. Cardiovascular: Negative for chest pain, palpitations and leg swelling. Gastrointestinal: Negative for abdominal pain, blood in stool, diarrhea, nausea and vomiting. Genitourinary: Positive for dysuria. Negative for flank pain and frequency. Musculoskeletal: Negative for arthralgias and back pain. Neurological: Positive for headaches. Negative for dizziness, weakness and light-headedness. Psychiatric/Behavioral: Negative for sleep disturbance. Objective: Intake/Output Summary (Last 24 hours) at 7/9/2022 1209  Last data filed at 7/9/2022 0100  Gross per 24 hour   Intake 850 ml   Output 1500 ml   Net -650 ml        Vitals:   Vitals:    07/09/22 1208   BP: 123/85   Pulse: 80   Resp: 18   Temp: 98 °F (36.7 °C)   SpO2:        Physical Exam:   Physical Exam  Vitals reviewed. Constitutional:       Appearance: Normal appearance. She is normal weight. HENT:      Head: Normocephalic. Nose: Nose normal.      Mouth/Throat:      Mouth: Mucous membranes are moist.   Eyes:      Conjunctiva/sclera: Conjunctivae normal.      Pupils: Pupils are equal, round, and reactive to light. Cardiovascular:      Rate and Rhythm: Normal rate and regular rhythm. Pulses: Normal pulses. Heart sounds: Normal heart sounds. No murmur heard.       Pulmonary: Effort: Pulmonary effort is normal.      Breath sounds: Normal breath sounds. No wheezing, rhonchi or rales. Abdominal:      General: Abdomen is flat. Bowel sounds are normal. There is no distension. Palpations: Abdomen is soft. Tenderness: There is no abdominal tenderness. There is no right CVA tenderness. Musculoskeletal:         General: No deformity. Normal range of motion. Cervical back: Normal range of motion and neck supple. Right lower leg: No edema. Left lower leg: No edema. Skin:     Coloration: Skin is not jaundiced or pale. Neurological:      General: No focal deficit present. Mental Status: She is alert and oriented to person, place, and time. Mental status is at baseline.             Medications:   Medications:    vancomycin  1,000 mg IntraVENous Q12H    sodium chloride flush  10 mL IntraVENous 2 times per day    enoxaparin  40 mg SubCUTAneous Daily    piperacillin-tazobactam  3,375 mg IntraVENous Q8H      Infusions:    sodium chloride      sodium chloride 100 mL/hr at 07/08/22 2011     PRN Meds: ibuprofen, 800 mg, Q6H PRN  sodium chloride flush, 10 mL, PRN  sodium chloride, , PRN  ondansetron, 4 mg, Q8H PRN   Or  ondansetron, 4 mg, Q6H PRN  bisacodyl, 5 mg, Daily PRN  acetaminophen, 650 mg, Q6H PRN   Or  acetaminophen, 650 mg, Q6H PRN  butalbital-acetaminophen-caffeine, 1 tablet, Q4H PRN        Labs      Recent Results (from the past 24 hour(s))   CBC    Collection Time: 07/09/22  3:36 AM   Result Value Ref Range    WBC 8.3 4.0 - 10.5 K/CU MM    RBC 3.91 (L) 4.2 - 5.4 M/CU MM    Hemoglobin 9.9 (L) 12.5 - 16.0 GM/DL    Hematocrit 32.5 (L) 37 - 47 %    MCV 83.1 78 - 100 FL    MCH 25.3 (L) 27 - 31 PG    MCHC 30.5 (L) 32.0 - 36.0 %    RDW 15.9 (H) 11.7 - 14.9 %    Platelets 183 (L) 463 - 440 K/CU MM    MPV 10.3 7.5 - 11.1 FL   Lactate Dehydrogenase    Collection Time: 07/09/22  3:36 AM   Result Value Ref Range     120 - 246 IU/L   Basic Metabolic Panel w/ Reflex to MG    Collection Time: 07/09/22  3:36 AM   Result Value Ref Range    Sodium 136 135 - 145 MMOL/L    Potassium 3.7 3.5 - 5.1 MMOL/L    Chloride 103 99 - 110 mMol/L    CO2 23 21 - 32 MMOL/L    Anion Gap 10 4 - 16    BUN 11 6 - 23 MG/DL    CREATININE 0.7 0.6 - 1.1 MG/DL    Glucose 106 (H) 70 - 99 MG/DL    Calcium 8.1 (L) 8.3 - 10.6 MG/DL    GFR Non-African American >60 >60 mL/min/1.73m2    GFR African American >60 >60 mL/min/1.73m2   Procalcitonin    Collection Time: 07/09/22  3:36 AM   Result Value Ref Range    Procalcitonin 0.205         Imaging/Diagnostics Last 24 Hours   CT ABDOMEN PELVIS WO CONTRAST    Result Date: 7/7/2022  EXAMINATION: CT OF THE ABDOMEN AND PELVIS WITHOUT CONTRAST 7/7/2022 7:56 pm TECHNIQUE: CT of the abdomen and pelvis was performed without the administration of intravenous contrast. Multiplanar reformatted images are provided for review. Automated exposure control, iterative reconstruction, and/or weight based adjustment of the mA/kV was utilized to reduce the radiation dose to as low as reasonably achievable. COMPARISON: CT scan dated May 10, 2017 HISTORY: ORDERING SYSTEM PROVIDED HISTORY: flank pain TECHNOLOGIST PROVIDED HISTORY: Reason for exam:->flank pain Is the patient pregnant?->No Reason for Exam: right flank pain FINDINGS: Lower Chest: The lung bases are clear. Organs: The liver, spleen, pancreas, and adrenal glands demonstrate no acute abnormality. The gallbladder is surgically absent. Multiple bilateral intrarenal calculi are present. Largest calculus measures 12 mm, within the upper pole collecting system of the right kidney. 10 mm calculus is present within right renal pelvis. A mild degree of right hydronephrosis and hydroureter is present, secondary to a 4 mm calculus within the proximal ureter. Left perinephric stranding is present related to obstructive uropathy. GI/Bowel: Stomach appears normal.  Small bowel appears normal without evidence of obstruction. Patient is status post prior appendectomy. No focal inflammatory change or wall thickening is present involving the large bowel. Pelvis: Urinary bladder is incompletely distended. Uterus appears grossly normal.  Right ovarian cyst is again noted, measuring 6.5 x 4 cm in AP and transverse dimension, previously measured 7.2 x 6.2 cm. . Peritoneum/Retroperitoneum: Trace amount of free fluid is present within the left pelvis. No free air is present. No aneurysm formation noted. No pathological adenopathy is present. Bones/Soft Tissues: No acute osseous abnormality is present. 1. Mild degree of right hydronephrosis and hydroureter, secondary to a 4 mm calculus within the proximal right ureter. Right perinephric stranding is present, and may be related to obstructive uropathy or infection. Multiple additional right intrarenal calculi are present, largest measuring 12 mm 2. Nonobstructing left intrarenal calculi largest measuring 4 mm 3. 6.5 x 4 cm right adnexal cyst, decreased since the prior study. This was evaluated previously with ultrasound 4. Prior cholecystectomy and appendectomy     CT HEAD WO CONTRAST    Result Date: 7/7/2022  EXAMINATION: CT OF THE HEAD WITHOUT CONTRAST  7/7/2022 10:53 pm TECHNIQUE: CT of the head was performed without the administration of intravenous contrast. Automated exposure control, iterative reconstruction, and/or weight based adjustment of the mA/kV was utilized to reduce the radiation dose to as low as reasonably achievable. COMPARISON: CT head without contrast June 22, 2022. HISTORY: ORDERING SYSTEM PROVIDED HISTORY: head pain TECHNOLOGIST PROVIDED HISTORY: Has a \"code stroke\" or \"stroke alert\" been called? ->No Reason for exam:->head pain Is the patient pregnant?->No Reason for Exam: head pain FINDINGS: BRAIN/VENTRICLES: There is no acute intracranial hemorrhage, mass effect or midline shift. No abnormal extra-axial fluid collection.   The gray-white differentiation is maintained without evidence of an acute infarct. There is no evidence of hydrocephalus. ORBITS: The visualized portion of the orbits demonstrate no acute abnormality. SINUSES: The visualized paranasal sinuses and mastoid air cells demonstrate no acute abnormality. SOFT TISSUES/SKULL:  No acute abnormality of the visualized skull or soft tissues. No acute intracranial abnormality.        Electronically signed by Brian Sun MD, MD on 7/9/2022 at 12:09 PM

## 2022-07-09 NOTE — OP NOTE
[address] [place] [address], [address] [place] [address]                                OPERATIVE REPORT    PATIENT NAME: Becca ALAN            :        1985  MED REC NO:   [id number]                          ROOM:       [id number]  ACCOUNT NO:   [de-identified]                           ADMIT DATE: 2022  PROVIDER:     Vadim Davey MD    DATE OF PROCEDURE:  2022    PREOPERATIVE DIAGNOSES:  1. Right ureteral calculus. 2.  Urinary tract infection, febrile. POSTOPERATIVE DIAGNOSES:  1. Right ureteral calculus. 2.  Urinary tract infection, febrile. OPERATION PERFORMED:  Cystoscopy, right retrograde pyelogram, right  ureteral stent placement. SURGEON:  Vadim Davey MD    ANESTHESIA:  Monitored anesthesia care. ESTIMATED BLOOD LOSS:  None. COMPLICATIONS:  None. BRIEF HISTORY:  A 26-year-old female with history of nephrolithiasis  presents with a 4 mm right proximal ureteral stone with high fevers of  103 and urinalysis positive for infection. She is here now for urgent  cysto and right stent placement. DESCRIPTION OF PROCEDURE:  The patient was identified in the holding  area, taken to procedure room, placed in a dorsal lithotomy position. The patient's genital region was prepped and draped in the usual sterile  fashion. A 21-[place] cystoscope sheath was introduced per urethra under  direct visualization. The patient's bladder had diffuse cystitis  cystica. Right ureteral orifice was identified. Retrograde pyelogram portion of the procedure: An 8-[place] cone-tip  catheter was inserted into the right ureteral orifice. Injection of  retrograde contrast material demonstrated a filling defect in the right  UPJ with right hydronephrosis. A sensor wire was passed into the right ureter into the kidney under  fluoroscopy.   A 4.8-[place] variable length double-J ureteral stent was  passed over a guidewire in a standard fashion. Position confirmed using  fluoroscopy and cystoscopy and deemed good. The patient's bladder was  emptied, taken to recovery room having tolerated the procedure well. DISCHARGE SUMMARY:  The patient was discharged back to the floor with IV  antibiotics. She will need to continue this until she is afebrile for  24 hours and in a couple of weeks we will make plans for cysto,  ureteroscopy and stone manipulation.         Veda Verdugo MD    D: 07/08/2022 15:22:11       T: 07/09/2022 2:57:53     [email or web address]  Job#: [id number]     Doc#: [id number]    CC:

## 2022-07-10 VITALS
HEART RATE: 75 BPM | HEIGHT: 69 IN | DIASTOLIC BLOOD PRESSURE: 77 MMHG | BODY MASS INDEX: 31.1 KG/M2 | RESPIRATION RATE: 16 BRPM | OXYGEN SATURATION: 96 % | SYSTOLIC BLOOD PRESSURE: 130 MMHG | WEIGHT: 210 LBS | TEMPERATURE: 100.5 F

## 2022-07-10 LAB
ANION GAP SERPL CALCULATED.3IONS-SCNC: 11 MMOL/L (ref 4–16)
BASOPHILS ABSOLUTE: 0 K/CU MM
BASOPHILS RELATIVE PERCENT: 0.3 % (ref 0–1)
BUN BLDV-MCNC: 8 MG/DL (ref 6–23)
CALCIUM SERPL-MCNC: 8.9 MG/DL (ref 8.3–10.6)
CHLORIDE BLD-SCNC: 102 MMOL/L (ref 99–110)
CO2: 26 MMOL/L (ref 21–32)
CREAT SERPL-MCNC: 0.8 MG/DL (ref 0.6–1.1)
CULTURE: ABNORMAL
CULTURE: ABNORMAL
DIFFERENTIAL TYPE: ABNORMAL
DOSE AMOUNT: NORMAL
DOSE TIME: NORMAL
EOSINOPHILS ABSOLUTE: 0.1 K/CU MM
EOSINOPHILS RELATIVE PERCENT: 1.5 % (ref 0–3)
GFR AFRICAN AMERICAN: >60 ML/MIN/1.73M2
GFR NON-AFRICAN AMERICAN: >60 ML/MIN/1.73M2
GLUCOSE BLD-MCNC: 85 MG/DL (ref 70–99)
HCT VFR BLD CALC: 37 % (ref 37–47)
HEMOGLOBIN: 11.2 GM/DL (ref 12.5–16)
IMMATURE NEUTROPHIL %: 0.3 % (ref 0–0.43)
LYMPHOCYTES ABSOLUTE: 1.5 K/CU MM
LYMPHOCYTES RELATIVE PERCENT: 23.7 % (ref 24–44)
Lab: ABNORMAL
MCH RBC QN AUTO: 25.1 PG (ref 27–31)
MCHC RBC AUTO-ENTMCNC: 30.3 % (ref 32–36)
MCV RBC AUTO: 83 FL (ref 78–100)
MONOCYTES ABSOLUTE: 0.8 K/CU MM
MONOCYTES RELATIVE PERCENT: 11.5 % (ref 0–4)
NUCLEATED RBC %: 0 %
PDW BLD-RTO: 15.9 % (ref 11.7–14.9)
PLATELET # BLD: 158 K/CU MM (ref 140–440)
PMV BLD AUTO: 9.5 FL (ref 7.5–11.1)
POTASSIUM SERPL-SCNC: 3.8 MMOL/L (ref 3.5–5.1)
RBC # BLD: 4.46 M/CU MM (ref 4.2–5.4)
SEGMENTED NEUTROPHILS ABSOLUTE COUNT: 4.1 K/CU MM
SEGMENTED NEUTROPHILS RELATIVE PERCENT: 62.7 % (ref 36–66)
SODIUM BLD-SCNC: 139 MMOL/L (ref 135–145)
SPECIMEN: ABNORMAL
TOTAL IMMATURE NEUTOROPHIL: 0.02 K/CU MM
TOTAL NUCLEATED RBC: 0 K/CU MM
VANCOMYCIN RANDOM: 5.6 UG/ML
WBC # BLD: 6.5 K/CU MM (ref 4–10.5)

## 2022-07-10 PROCEDURE — 85025 COMPLETE CBC W/AUTO DIFF WBC: CPT

## 2022-07-10 PROCEDURE — 80202 ASSAY OF VANCOMYCIN: CPT

## 2022-07-10 PROCEDURE — 6370000000 HC RX 637 (ALT 250 FOR IP): Performed by: HOSPITALIST

## 2022-07-10 PROCEDURE — 2580000003 HC RX 258: Performed by: INTERNAL MEDICINE

## 2022-07-10 PROCEDURE — 6360000002 HC RX W HCPCS: Performed by: SURGERY

## 2022-07-10 PROCEDURE — 6360000002 HC RX W HCPCS: Performed by: INTERNAL MEDICINE

## 2022-07-10 PROCEDURE — 80048 BASIC METABOLIC PNL TOTAL CA: CPT

## 2022-07-10 PROCEDURE — 2580000003 HC RX 258: Performed by: SURGERY

## 2022-07-10 PROCEDURE — 36415 COLL VENOUS BLD VENIPUNCTURE: CPT

## 2022-07-10 RX ORDER — ONDANSETRON 4 MG/1
4 TABLET, ORALLY DISINTEGRATING ORAL EVERY 8 HOURS PRN
Qty: 90 TABLET | Refills: 0 | Status: SHIPPED | OUTPATIENT
Start: 2022-07-10 | End: 2022-08-02 | Stop reason: ALTCHOICE

## 2022-07-10 RX ORDER — CEFUROXIME AXETIL 500 MG/1
500 TABLET ORAL 2 TIMES DAILY
Qty: 16 TABLET | Refills: 0 | Status: SHIPPED | OUTPATIENT
Start: 2022-07-10 | End: 2022-07-10 | Stop reason: SDUPTHER

## 2022-07-10 RX ORDER — CEFUROXIME AXETIL 500 MG/1
500 TABLET ORAL 2 TIMES DAILY
Qty: 16 TABLET | Refills: 0 | Status: SHIPPED | OUTPATIENT
Start: 2022-07-10 | End: 2022-07-18

## 2022-07-10 RX ADMIN — ENOXAPARIN SODIUM 40 MG: 100 INJECTION SUBCUTANEOUS at 08:32

## 2022-07-10 RX ADMIN — SODIUM CHLORIDE, PRESERVATIVE FREE 10 ML: 5 INJECTION INTRAVENOUS at 08:32

## 2022-07-10 RX ADMIN — VANCOMYCIN HYDROCHLORIDE 1000 MG: 1 INJECTION, POWDER, LYOPHILIZED, FOR SOLUTION INTRAVENOUS at 05:28

## 2022-07-10 RX ADMIN — PIPERACILLIN AND TAZOBACTAM 3375 MG: 3; .375 INJECTION, POWDER, LYOPHILIZED, FOR SOLUTION INTRAVENOUS at 08:38

## 2022-07-10 RX ADMIN — SODIUM CHLORIDE: 9 INJECTION, SOLUTION INTRAVENOUS at 05:26

## 2022-07-10 RX ADMIN — BUTALBITAL, ACETAMINOPHEN AND CAFFEINE 1 TABLET: 50; 325; 40 TABLET ORAL at 08:32

## 2022-07-10 ASSESSMENT — PAIN SCALES - GENERAL
PAINLEVEL_OUTOF10: 4
PAINLEVEL_OUTOF10: 5
PAINLEVEL_OUTOF10: 5
PAINLEVEL_OUTOF10: 2

## 2022-07-10 ASSESSMENT — PAIN DESCRIPTION - LOCATION: LOCATION: HEAD

## 2022-07-10 NOTE — DISCHARGE SUMMARY
V2.0  Discharge Summary    Name:  Ada Ron /Age/Sex: 1985 (43 y.o. female)   Admit Date: 2022  Discharge Date: 7/10/22    MRN & CSN:  [id number] & [id number] Encounter Date and Time 7/10/22 11:05 AM EDT    Attending:  Álvaro Valdes MD Discharging Provider: Álvaro Valdes MD, MD       Hospital Course:     Brief HPI: Ada Ron is a 43 y.o. female who presented with Obstructive uropathy    Brief Problem Based Course:   1. Sepsis secondary to right-sided pyelonephritis and obstructive uropathy: CAT scan showed right-sided hydronephrosis with hydroureter secondary to a 4 mm calculus likely obstructive in the proximal right ureter associated with perinephric stranding.  Urology consult in place.  Urology took the patient for cystoscopy with stent placement of the right ureter.  Received Toradol for appropriate pain.  Urology plan is outpatient right-sided ureteroscopy and stone manipulation of the right renal and ureteral stones.  Await urine cultures. Will need NSAID for anti-inflammatory effects along with pain control effects. Doing well overall. Urine culture grows pan-sensitive E Coli. Will be send home on Ceftin for 8 more days. Zofran PRN every 6 hrs for nausea for 8 days as well. Urology OPD folow up for OPD ureteroscopy and stent management. 2. Elevated blood pressure: Pain related, better now  3. Migraine: NSAID needs to be taken until this stent is removed due to the stress response trigger of the body getting activated by the presence f the foreign body as a stent. The patient expressed appropriate understanding of, and agreement with the discharge recommendations, medications, and plan. Consults this admission:  PHARMACY TO DOSE VANCOMYCIN    Discharge Diagnosis:   Pyelonephritis    Obstructive uroptahy s/p stent.     Discharge Instruction:   Follow up appointments: PCP, Urology   Primary care physician: TERI Christine within 2 weeks  Diet: regular diet   Activity: activity as tolerated  Disposition: Discharged to:   []Home, [][facility], []SNF, []Acute Rehab, []Hospice   Condition on discharge: Stable  Labs and Tests to be Followed up as an outpatient by PCP or Specialist: Urology for stent management    Discharge Medications:        Medication List      START taking these medications    ondansetron 4 MG disintegrating tablet  Commonly known as: ZOFRAN-ODT  Take 1 tablet by mouth every 8 hours as needed for Nausea or Vomiting     sulfamethoxazole-trimethoprim 800-160 MG per tablet  Commonly known as: BACTRIM DS;SEPTRA DS  Take 1 tablet by mouth 2 times daily for 8 days        CONTINUE taking these medications    acetaminophen 500 MG tablet  Commonly known as: TYLENOL     blood glucose test strips  Test 4 times a day & as needed for symptoms of irregular blood glucose. Dispense sufficient amount for indicated testing frequency plus additional to accommodate PRN testing needs.      docusate sodium 100 MG capsule  Commonly known as: COLACE  Take 1 capsule by mouth 2 times daily     glucose monitoring kit  1 kit by Does not apply route daily     ibuprofen 800 MG tablet  Commonly known as: ADVIL;MOTRIN  Take 1 tablet by mouth every 8 hours     Lancets Misc  1 each by Does not apply route 4 times daily     Prenatal Gummies/DHA & FA 0.4-32.5 MG Chew  Take 1 tablet by mouth daily           Where to Get Your Medications      These medications were sent to [address] [place] [address] 11, [facility]     Phone: [phone]   · ondansetron 4 MG disintegrating tablet  · sulfamethoxazole-trimethoprim 800-160 MG per tablet        Objective Findings at Discharge:   /77   Pulse 75   Temp (!) 100.5 °F (38.1 °C) (Oral)   Resp 16   Ht 5' 9\" (1.753 m)   Wt 210 lb (95.3 kg)   LMP 09/14/2021   SpO2 96%   BMI 31.01 kg/m²       Physical Exam:   Physical Exam  Vitals reviewed. Constitutional:       Appearance: Normal appearance. She is normal weight. HENT:      Head: Normocephalic. Nose: Nose normal.      Mouth/Throat:      Mouth: Mucous membranes are moist.   Eyes:      Conjunctiva/sclera: Conjunctivae normal.      Pupils: Pupils are equal, round, and reactive to light. Cardiovascular:      Rate and Rhythm: Normal rate and regular rhythm. Pulses: Normal pulses. Heart sounds: Normal heart sounds. No murmur heard. Pulmonary:      Effort: Pulmonary effort is normal.      Breath sounds: Normal breath sounds. No wheezing, rhonchi or rales. Abdominal:      General: Abdomen is flat. Bowel sounds are normal. There is no distension. Palpations: Abdomen is soft. Tenderness: There is no abdominal tenderness. Musculoskeletal:         General: No deformity. Normal range of motion. Cervical back: Normal range of motion and neck supple. Right lower leg: No edema. Left lower leg: No edema. Skin:     Coloration: Skin is not jaundiced or pale. Neurological:      General: No focal deficit present. Mental Status: She is alert and oriented to person, place, and time. Mental status is at baseline. Psychiatric:         Mood and Affect: Mood normal.         Behavior: Behavior normal.              Labs and Imaging   FL LESS THAN 1 HOUR    Result Date: 7/8/2022  EXAMINATION: SPOT FLUOROSCOPIC IMAGE 7/8/2022 3:13 pm TECHNIQUE: Fluoroscopy was provided by the radiology department for procedure. Radiologist was not present during examination. FLUOROSCOPY DOSE AND TYPE OR TIME AND EXPOSURES: 5.3 mGy. 0.3 minutes. COMPARISON: CT abdomen and pelvis 07/07/2022. HISTORY: ORDERING SYSTEM PROVIDED HISTORY: cysto TECHNOLOGIST PROVIDED HISTORY: Reason for exam:->cysto Reason for Exam: rt cysto Intraprocedural imaging. FINDINGS: A single spot image of the right urinary collecting system was obtained.  The image demonstrates partial contrast opacification of the mid to distal ureter. No definite filling defect or extravasation. Intraprocedural fluoroscopic spot images as above. See separate procedure report for more information. CBC:   Recent Labs     07/07/22  2115 07/09/22  0336 07/10/22  0408   WBC 11.5* 8.3 6.5   HGB 12.5 9.9* 11.2*    139* 158     BMP:    Recent Labs     07/07/22  2115 07/09/22  0336 07/10/22  0408    136 139   K 4.0 3.7 3.8    103 102   CO2 24 23 26   BUN 9 11 8   CREATININE 0.9 0.7 0.8   GLUCOSE 113* 106* 85     Hepatic:   Recent Labs     07/07/22 2115   AST 14*   ALT 22   BILITOT 0.5   ALKPHOS 88     Lipids: No results found for: CHOL, HDL, TRIG  Hemoglobin A1C:   Lab Results   Component Value Date/Time    LABA1C 5.3 05/10/2022 11:20 AM     TSH: No results found for: TSH  Troponin: No results found for: TROPONINT  Lactic Acid: No results for input(s): LACTA in the last 72 hours. BNP: No results for input(s): PROBNP in the last 72 hours.   UA:  Lab Results   Component Value Date/Time    NITRU POSITIVE 07/07/2022 09:15 PM    COLORU YELLOW 07/07/2022 09:15 PM    WBCUA 40 07/07/2022 09:15 PM    RBCUA 8 07/07/2022 09:15 PM    MUCUS RARE 07/26/2018 09:40 AM    TRICHOMONAS NONE SEEN 07/26/2018 09:40 AM    YEAST RARE 04/29/2018 12:04 PM    BACTERIA 2+ 07/07/2022 09:15 PM    CLARITYU SLIGHTLY CLOUDY 07/07/2022 09:15 PM    SPECGRAV 1.015 07/07/2022 09:15 PM    LEUKOCYTESUR MODERATE 07/07/2022 09:15 PM    UROBILINOGEN 0.2 07/07/2022 09:15 PM    BILIRUBINUR NEGATIVE 07/07/2022 09:15 PM    BLOODU LARGE 07/07/2022 09:15 PM    KETUA NEGATIVE 07/07/2022 09:15 PM     Urine Cultures:   Lab Results   Component Value Date/Time    LABURIN >100,000 CFU/ml 01/13/2022 10:53 AM     Blood Cultures: No results found for: BC  No results found for: BLOODCULT2  Organism:   Lab Results   Component Value Date/Time    ORG BHS Group B (Strep agalacticae) 05/24/2022 11:18 AM       Time Spent Discharging patient 40 minutes    Electronically signed by Mey Thompson MD, MD on 7/10/2022 at 11:05 AM

## 2022-07-10 NOTE — PLAN OF CARE
Problem: Discharge Planning  Goal: Discharge to home or other facility with appropriate resources  Outcome: Progressing     Problem: Pain  Goal: Verbalizes/displays adequate comfort level or baseline comfort level  Outcome: Progressing     Problem: Chronic Conditions and Co-morbidities  Goal: Patient's chronic conditions and co-morbidity symptoms are monitored and maintained or improved  Outcome: Progressing       Electronically signed by Earl Goldberg RN on 7/10/22 at 3:03 AM EDT

## 2022-07-10 NOTE — PROGRESS NOTES
[address]  consulted by Dr. Marlin Anderson for monitoring and adjustment. Indication for treatment: Fever of unknown origin  Goal trough: [] 10-15 mcg/mL or [x] 15-20 mcg/ml  AUC/DALILA: [] <500 or [x] 400-600    Pertinent Laboratory Values:   Temp Readings from Last 3 Encounters:   07/10/22 (!) 100.5 °F (38.1 °C) (Oral)   06/23/22 98.8 °F (37.1 °C) (Oral)   06/22/22 98.2 °F (36.8 °C) (Oral)     Recent Labs     07/07/22  2115 07/09/22  0336 07/10/22  0408   WBC 11.5* 8.3 6.5   LACTATE 1.5  --   --      Recent Labs     07/07/22  2115 07/09/22  0336 07/10/22  0408   BUN 9 11 8   CREATININE 0.9 0.7 0.8     Estimated Creatinine Clearance: 117 mL/min (based on SCr of 0.8 mg/dL). Intake/Output Summary (Last 24 hours) at 7/10/2022 0926  Last data filed at 7/9/2022 2018  Gross per 24 hour   Intake 10 ml   Output 500 ml   Net -490 ml       Pertinent Cultures:  Date    Source    Results  7/7   Urine    Pending  7/9   Covid-19   Ordered  7/9   Blood x 2   Ordered    Vancomycin level:   TROUGH:  No results for input(s): VANCOTROUGH in the last 72 hours. RANDOM:    Recent Labs     07/10/22  0408   VANCORANDOM 5.6       Assessment:  · SCr, BUN, and urine output: WNL  · Day(s) of therapy: 1  · Vancomycin concentration:   · 7/10:  5.6, 11 hours post-dose, sub-therapeutic     Plan:  · Random level sub-therapeutic @ 5.6  · Changed dose to Vancomycin 1500mg IV every 12 hours  · Predicted  and Trough 13.1  · Repeat level in 2 days  · Pharmacy will continue to monitor patient and adjust therapy as indicated    VANCOMYCIN CONCENTRATION SCHEDULED FOR 7/12 @0400    Thank you for the consult.   Myrna Boston, [facility]  7/10/2022 9:26 AM

## 2022-07-10 NOTE — PLAN OF CARE
Problem: Discharge Planning  Goal: Discharge to home or other facility with appropriate resources  7/10/2022 0925 by Mario England LPN  Outcome: Progressing  7/10/2022 0303 by Reagan Segal RN  Outcome: Progressing     Problem: Pain  Goal: Verbalizes/displays adequate comfort level or baseline comfort level  7/10/2022 0925 by Mario England LPN  Outcome: Progressing  7/10/2022 0303 by Reagan Segal RN  Outcome: Progressing     Problem: Chronic Conditions and Co-morbidities  Goal: Patient's chronic conditions and co-morbidity symptoms are monitored and maintained or improved  7/10/2022 0925 by Mario England LPN  Outcome: Progressing  7/10/2022 0303 by Reagan Segal RN  Outcome: Progressing

## 2022-07-11 LAB
CULTURE: ABNORMAL
CULTURE: ABNORMAL
Lab: ABNORMAL
SPECIMEN: ABNORMAL

## 2022-07-12 ENCOUNTER — HOSPITAL ENCOUNTER (EMERGENCY)
Age: 37
Discharge: ANOTHER HOSPITAL | End: 2022-07-08
Payer: MEDICARE

## 2022-07-12 ENCOUNTER — OFFICE VISIT (OUTPATIENT)
Dept: OBGYN | Age: 37
End: 2022-07-12
Payer: MEDICARE

## 2022-07-12 VITALS
WEIGHT: 204 LBS | BODY MASS INDEX: 30.21 KG/M2 | DIASTOLIC BLOOD PRESSURE: 88 MMHG | SYSTOLIC BLOOD PRESSURE: 136 MMHG | HEIGHT: 69 IN

## 2022-07-12 DIAGNOSIS — A49.8: ICD-10-CM

## 2022-07-12 DIAGNOSIS — N30.00 ACUTE CYSTITIS WITHOUT HEMATURIA: Primary | ICD-10-CM

## 2022-07-12 DIAGNOSIS — N10 ACUTE PYELONEPHRITIS: ICD-10-CM

## 2022-07-12 PROCEDURE — 1036F TOBACCO NON-USER: CPT | Performed by: OBSTETRICS & GYNECOLOGY

## 2022-07-12 PROCEDURE — 1111F DSCHRG MED/CURRENT MED MERGE: CPT | Performed by: OBSTETRICS & GYNECOLOGY

## 2022-07-12 PROCEDURE — G8417 CALC BMI ABV UP PARAM F/U: HCPCS | Performed by: OBSTETRICS & GYNECOLOGY

## 2022-07-12 PROCEDURE — 36415 COLL VENOUS BLD VENIPUNCTURE: CPT | Performed by: OBSTETRICS & GYNECOLOGY

## 2022-07-12 PROCEDURE — 99214 OFFICE O/P EST MOD 30 MIN: CPT | Performed by: OBSTETRICS & GYNECOLOGY

## 2022-07-12 PROCEDURE — G8427 DOCREV CUR MEDS BY ELIG CLIN: HCPCS | Performed by: OBSTETRICS & GYNECOLOGY

## 2022-07-12 ASSESSMENT — ENCOUNTER SYMPTOMS
RESPIRATORY NEGATIVE: 1
EYES NEGATIVE: 1
GASTROINTESTINAL NEGATIVE: 1
ALLERGIC/IMMUNOLOGIC NEGATIVE: 1

## 2022-07-12 NOTE — PROGRESS NOTES
22    Esther Benedict  1985    Chief Complaint   Patient presents with    Follow-up     pt here for ER f/u. states went to ER 22 due to headache and increased bp. states was told she has kidney stones and infection. instructed to f/u with ob/gyn for bp check. vaginal delivery 22. no c/o today. Esther Benedict is a 43 y.o. female who presents today for evaluation of preeclampsia, kidney stones, pyleonephritis. Feeling better.   No fevers since     Past Medical History:   Diagnosis Date    Acute appendicitis 2012    Chemical pregnancy     Irregular menses     Kidney infection     Kidney stone     Kidney stones     Obesity     Pregnant     Prior pregnancy with fetal demise and current pregnancy 2007    36Wks       Past Surgical History:   Procedure Laterality Date    APPENDECTOMY      BLADDER SURGERY Right 2022    CYSTOSCOPY RIGHT  RETROGRADE PYELOGRAM STENT INSERTION performed by Madelin Bundy MD at [address] [place] [address] Rd  04/10/12    OTHER SURGICAL HISTORY  2010    lump removed from right axilla    OTHER SURGICAL HISTORY  2010    lump removed from groin area    OTHER SURGICAL HISTORY Right 2013    Excision of lesion to R axilla    OTHER SURGICAL HISTORY Right 2013    Closure right axillary wound       Social History     Tobacco Use    Smoking status: Former Smoker     Packs/day: 1.00     Years: 15.00     Pack years: 15.00     Types: Cigarettes     Quit date: 2017     Years since quittin.6    Smokeless tobacco: Never Used    Tobacco comment: quit when she found out she was pregnant   Vaping Use    Vaping Use: Never used   Substance Use Topics    Alcohol use: No    Drug use: No       Family History   Problem Relation Age of Onset    High Blood Pressure Father     High Cholesterol Father     Cancer Sister         Lymphoma    High Cholesterol Mother     Diabetes Paternal Aunt     Cancer Maternal Grandfather     Cancer Paternal Grandmother     Stroke Paternal Grandmother     Arthritis Paternal Grandfather        Current Outpatient Medications   Medication Sig Dispense Refill    cefUROXime (CEFTIN) 500 MG tablet Take 1 tablet by mouth 2 times daily for 8 days 16 tablet 0    ondansetron (ZOFRAN-ODT) 4 MG disintegrating tablet Take 1 tablet by mouth every 8 hours as needed for Nausea or Vomiting 90 tablet 0    acetaminophen (TYLENOL) 500 MG tablet Take 1,000 mg by mouth every 6 hours as needed for Pain      docusate sodium (COLACE) 100 MG capsule Take 1 capsule by mouth 2 times daily 30 capsule 1    ibuprofen (ADVIL;MOTRIN) 800 MG tablet Take 1 tablet by mouth every 8 hours 120 tablet 3    blood glucose monitor strips Test 4 times a day & as needed for symptoms of irregular blood glucose. Dispense sufficient amount for indicated testing frequency plus additional to accommodate PRN testing needs. 200 strip 3    glucose monitoring (FREESTYLE FREEDOM) kit 1 kit by Does not apply route daily 1 kit 0    Lancets MISC 1 each by Does not apply route 4 times daily 200 each 5    Prenatal MV-Min-FA-Omega-3 (PRENATAL GUMMIES/DHA & FA) 0.4-32.5 MG CHEW Take 1 tablet by mouth daily 30 tablet 11     No current facility-administered medications for this visit. No Known Allergies    [id number]    Immunization History   Administered Date(s) Administered    Tdap (Boostrix, Adacel) 07/29/2018       Review of Systems   Constitutional: Negative. Eyes: Negative. Respiratory: Negative. Cardiovascular: Negative. Gastrointestinal: Negative. Endocrine: Negative. Genitourinary: Negative. Musculoskeletal: Negative. Skin: Negative. Allergic/Immunologic: Negative. Neurological: Negative. Hematological: Negative. Psychiatric/Behavioral: Negative.         /88   Ht 5' 9\" (1.753 m)   Wt 204 lb (92.5 kg)   LMP 09/14/2021   BMI 30.13 kg/m²     Physical Exam  Constitutional:       General: She is not in acute distress. Appearance: Normal appearance. She is not ill-appearing. HENT:      Head: Normocephalic. Nose: Nose normal.   Eyes:      General: No scleral icterus. Right eye: No discharge. Left eye: No discharge. Cardiovascular:      Pulses: Normal pulses. Pulmonary:      Effort: Pulmonary effort is normal.   Abdominal:      General: Abdomen is flat. There is no distension. Palpations: Abdomen is soft. There is no mass. Tenderness: There is no abdominal tenderness. Hernia: No hernia is present. Musculoskeletal:         General: Normal range of motion. Cervical back: Normal range of motion and neck supple. No rigidity. Skin:     General: Skin is warm and dry. Neurological:      General: No focal deficit present. Mental Status: She is alert and oriented to person, place, and time.    Psychiatric:         Mood and Affect: Mood normal.         Behavior: Behavior normal.       07/10/2022 0408 07/10/2022 0437 CBC auto differential [[id number]]   (Abnormal)    Component Value Units   WBC 6.5 K/CU MM   RBC 4.46 M/CU MM   Hemoglobin 11.2 Low  GM/DL   Hematocrit 37.0 %   MCV 83.0 FL   MCH 25.1 Low  PG   MCHC 30.3 Low  %   RDW 15.9 High  %   Platelets 318 K/CU MM   MPV 9.5 FL   Differential Type AUTOMATED DIFFERENTIAL    Segs Relative 62.7 %   Lymphocytes % 23.7 Low  %   Monocytes % 11.5 High  %   Eosinophils % 1.5 %   Basophils % 0.3 %   Segs Absolute 4.1 K/CU MM   Lymphocytes Absolute 1.5 K/CU MM   Monocytes Absolute 0.8 K/CU MM   Eosinophils Absolute 0.1 K/CU MM   Basophils Absolute 0.0 K/CU MM   Nucleated RBC % 0.0 %   Total Nucleated RBC 0.0 K/CU MM   Total Immature Neutrophil 0.02 K/CU MM   Immature Neutrophil % 0.3 %           07/10/2022 0408 07/10/2022 1345 Basic Metabolic Panel w/ Reflex to MG [[id number]] Component Value Units   Sodium 139 MMOL/L   Potassium 3.8 MMOL/L   Chloride 102 mMol/L   CO2 26 MMOL/L Anion Gap 11    BUN 8 MG/DL   CREATININE 0.8 MG/DL   Glucose 85 MG/DL   Calcium 8.9 MG/DL   GFR Non-African American >60 mL/min/1.73m2   GFR African American >60 mL/min/1.73m2           07/10/2022 0408 07/10/2022 0512 Vancomycin Level, Random [[id number]]   Blood    Component Value Units   Vancomycin Rm 5.6  UG/ML   DOSE AMOUNT DOSE AMT.  GIVEN - 1000 mg    DOSE TIME DOSE TIME GIVEN - 1700            07/09/2022 0336 07/09/2022 0449 Procalcitonin [[id number]]   Blood    Component Value   Procalcitonin 0.205            07/09/2022 0336 07/09/2022 8018 Basic Metabolic Panel w/ Reflex to MG [[id number]]   (Abnormal)   Blood    Component Value Units   Sodium 136 MMOL/L   Potassium 3.7 MMOL/L   Chloride 103 mMol/L   CO2 23 MMOL/L   Anion Gap 10    BUN 11 MG/DL   CREATININE 0.7 MG/DL   Glucose 106 High  MG/DL   Calcium 8.1 Low  MG/DL   GFR Non-African American >60 mL/min/1.73m2   GFR African American >60 mL/min/1.73m2           07/09/2022 0336 07/09/2022 0517 Lactate Dehydrogenase [[id number]]   Blood    Component Value Units    IU/L           07/09/2022 0336 07/09/2022 0406 CBC [[id number]]   (Abnormal)   Blood    Component Value Units   WBC 8.3 K/CU MM   RBC 3.91 Low  M/CU MM   Hemoglobin 9.9 Low  GM/DL   Hematocrit 32.5 Low  %   MCV 83.1 FL   MCH 25.3 Low  PG   MCHC 30.5 Low  %   RDW 15.9 High  %   Platelets 121 FUL  K/CU MM   MPV 10.3 FL           07/09/2022 0335 07/11/2022 1319 Culture, Blood 1 [[id number]]    Blood    Component Value   Specimen BLOOD P   Special Requests NONE P   Culture NO GROWTH AT 48 HOURS P           07/09/2022 0335 07/12/2022 0808 Culture, Blood 1 [[id number]]    Blood    Component Value   Specimen BLOOD P   Special Requests NONE P   Culture NO GROWTH AT 72 HOURS P           07/07/2022 2115 07/07/2022 2250 HCG Serum, Quantitative [[id number]]   Blood    Component Value Units   hCG Quant 0.5  UIU/ML           07/07/2022 2115 07/10/2022 0823 Culture, Urine [[id number]]     (Abnormal) Urine, clean catch    Component Value   Specimen URINE CLEAN CATCH   Special Requests NONE   Culture Final Report   Culture ESCHERICHIA COLI >100,000 CFU/ml Abnormal            07/07/2022 2115 07/07/2022 2250 Lactic Acid [[id number]]   Blood    Component Value Units   Lactate 1.5 mMOL/L           07/07/2022 2115 07/11/2022 1019 Culture, Blood 2 [[id number]]    (Abnormal)   Blood    Component Value   Specimen BLOOD   Special Requests STAPH SPP,ONE OF FOUR BOTTLES POSITIVE,CALLED TO N3 ALEXANDRE VAIL [id number] BY BEL MLT   PCR TESTING FOR IDENTIFICATION OF BLOOD CULTURE ISOLATE. TESTING FOR 24 TARGETS AND 3 GENES. Culture Final Report   Culture STAPHYLOCOCCUS COAGULASE-NEGATIVE POSITIVE for This organism was isolated in one set. Susceptibility testing is not routinely done as this organism frequently represents skin contamination.  Additional testing can be ordered by calling the Microbiology Department. Abnormal            07/07/2022 2115 07/11/2022 1318 Culture, Blood 1 [[id number]]    Blood    Component Value   Specimen BLOOD P   Special Requests NONE P   Culture NO GROWTH AT 72 HOURS P           07/07/2022 2115 07/07/2022 2300 Urinalysis [[id number]]   (Abnormal)   Urine, clean catch    Component Value Units   Color, UA YELLOW Abnormal     Clarity, UA SLIGHTLY CLOUDY Abnormal     Glucose, Urine NEGATIVE MG/DL   Bilirubin Urine NEGATIVE MG/DL   Ketones, Urine NEGATIVE MG/DL   Specific Gravity, UA 1.015    Blood, Urine LARGE Abnormal     pH, Urine 6.0    Protein, UA TRACE Abnormal  MG/DL   Urobilinogen, Urine 0.2 MG/DL   Nitrite Urine, Quantitative POSITIVE Abnormal     Leukocyte Esterase, Urine MODERATE Abnormal     RBC, UA 8 High  /HPF   WBC, UA 40 High  /HPF   Epithelial Cells, UA FEW /HPF   Cast Type UCFP /HPF   Bacteria, UA 2+ Abnormal  /HPF   Crystal Type NEGATIVE /HPF   Urinalysis Comments HIDE            07/07/2022 2115 07/07/2022 2233 CBC with Auto Differential [[id number]]   (Abnormal)   Blood Component Value Units   WBC 11.5 High  K/CU MM   RBC 4.88 M/CU MM   Hemoglobin 12.5 GM/DL   Hematocrit 39.5 %   MCV 80.9 FL   MCH 25.6 Low  PG   MCHC 31.6 Low  %   RDW 16.0 High  %   Platelets 220 K/CU MM   MPV 9.5 FL   Differential Type AUTOMATED DIFFERENTIAL    Segs Relative 82.6 High  %   Lymphocytes % 7.4 Low  %   Monocytes % 8.7 High  %   Eosinophils % 0.1 %   Basophils % 0.2 %   Segs Absolute 9.5 K/CU MM   Lymphocytes Absolute 0.9 K/CU MM   Monocytes Absolute 1.0 K/CU MM   Eosinophils Absolute 0.0 K/CU MM   Basophils Absolute 0.0 K/CU MM   Immature Neutrophil % 1.0 High  %   Total Immature Neutrophil 0.11 K/CU MM           07/07/2022 2115 07/07/2022 2254 Comprehensive Metabolic Panel [[id number]]   (Abnormal)   Blood    Component Value Units   Sodium 136 MMOL/L   Potassium 4.0 MMOL/L   Chloride 100 mMol/L   CO2 24 MMOL/L   BUN 9 MG/DL   CREATININE 0.9 MG/DL   Glucose 113 High  MG/DL   Calcium 9.2 MG/DL   Albumin 4.3 GM/DL   Total Protein 8.1 GM/DL   Total Bilirubin 0.5 MG/DL   ALT 22 U/L   AST 14 Low  IU/L   Alkaline Phosphatase 88 IU/L   GFR Non-African American >60 mL/min/1.73m2   GFR African American >60 mL/min/1.73m2   Anion Gap 12                No results found for this visit on 07/12/22. ASSESSMENT AND PLAN   Diagnosis Orders   1. Acute cystitis without hematuria  Culture, Urine    Urinalysis with Microscopic   2. E. coli infect  Culture, Urine    Urinalysis with Microscopic   3. Acute pyelonephritis     4. Gestational diabetes mellitus (GDM), delivered  Glucose, Fasting    Hemoglobin A1C   5. Postpartum hypertension       Finish ceftin    ua and culture when finished    BP OK today  Return in about 4 weeks (around 8/9/2022), or if symptoms worsen or fail to improve.     Henny Dalton MD

## 2022-07-13 LAB
CULTURE: NORMAL
Lab: NORMAL
SPECIMEN: NORMAL

## 2022-07-14 LAB
CULTURE: NORMAL
CULTURE: NORMAL
Lab: NORMAL
Lab: NORMAL
SPECIMEN: NORMAL
SPECIMEN: NORMAL

## 2022-07-25 ENCOUNTER — APPOINTMENT (OUTPATIENT)
Dept: CT IMAGING | Age: 37
End: 2022-07-25
Payer: MEDICARE

## 2022-07-25 ENCOUNTER — HOSPITAL ENCOUNTER (OUTPATIENT)
Age: 37
Setting detail: OBSERVATION
Discharge: HOME | End: 2022-07-27
Attending: EMERGENCY MEDICINE | Admitting: INTERNAL MEDICINE
Payer: MEDICARE

## 2022-07-25 DIAGNOSIS — N20.1 URETEROLITHIASIS: ICD-10-CM

## 2022-07-25 DIAGNOSIS — N12 PYELONEPHRITIS: Primary | ICD-10-CM

## 2022-07-25 PROBLEM — N39.0 UTI (URINARY TRACT INFECTION): Status: ACTIVE | Noted: 2022-07-25

## 2022-07-25 LAB
ALBUMIN SERPL-MCNC: 4.1 GM/DL (ref 3.4–5)
ALP BLD-CCNC: 82 IU/L (ref 40–129)
ALT SERPL-CCNC: 17 U/L (ref 10–40)
ANION GAP SERPL CALCULATED.3IONS-SCNC: 11 MMOL/L (ref 4–16)
AST SERPL-CCNC: 16 IU/L (ref 15–37)
BACTERIA: ABNORMAL /HPF
BASOPHILS ABSOLUTE: 0 K/CU MM
BASOPHILS RELATIVE PERCENT: 0.4 % (ref 0–1)
BILIRUB SERPL-MCNC: 0.6 MG/DL (ref 0–1)
BILIRUBIN URINE: NEGATIVE MG/DL
BLOOD, URINE: ABNORMAL
BUN BLDV-MCNC: 9 MG/DL (ref 6–23)
CALCIUM SERPL-MCNC: 8.9 MG/DL (ref 8.3–10.6)
CHLORIDE BLD-SCNC: 102 MMOL/L (ref 99–110)
CLARITY: CLEAR
CO2: 24 MMOL/L (ref 21–32)
COLOR: YELLOW
CREAT SERPL-MCNC: 0.8 MG/DL (ref 0.6–1.1)
DIFFERENTIAL TYPE: ABNORMAL
EOSINOPHILS ABSOLUTE: 0.1 K/CU MM
EOSINOPHILS RELATIVE PERCENT: 0.7 % (ref 0–3)
GFR AFRICAN AMERICAN: >60 ML/MIN/1.73M2
GFR NON-AFRICAN AMERICAN: >60 ML/MIN/1.73M2
GLUCOSE BLD-MCNC: 108 MG/DL (ref 70–99)
GLUCOSE, URINE: NEGATIVE MG/DL
HCG QUALITATIVE: NEGATIVE
HCT VFR BLD CALC: 39.1 % (ref 37–47)
HEMOGLOBIN: 11.7 GM/DL (ref 12.5–16)
IMMATURE NEUTROPHIL %: 0.4 % (ref 0–0.43)
INTERPRETATION: NORMAL
KETONES, URINE: NEGATIVE MG/DL
LACTIC ACID, SEPSIS: 1.5 MMOL/L (ref 0.5–1.9)
LEUKOCYTE ESTERASE, URINE: ABNORMAL
LIPASE: 22 IU/L (ref 13–60)
LYMPHOCYTES ABSOLUTE: 0.8 K/CU MM
LYMPHOCYTES RELATIVE PERCENT: 8.1 % (ref 24–44)
MCH RBC QN AUTO: 24.7 PG (ref 27–31)
MCHC RBC AUTO-ENTMCNC: 29.9 % (ref 32–36)
MCV RBC AUTO: 82.7 FL (ref 78–100)
MONOCYTES ABSOLUTE: 0.8 K/CU MM
MONOCYTES RELATIVE PERCENT: 7.9 % (ref 0–4)
MUCUS: ABNORMAL HPF
NITRITE URINE, QUANTITATIVE: POSITIVE
NUCLEATED RBC %: 0 %
PDW BLD-RTO: 14.8 % (ref 11.7–14.9)
PH, URINE: 7.5 (ref 5–8)
PLATELET # BLD: 214 K/CU MM (ref 140–440)
PMV BLD AUTO: 9.8 FL (ref 7.5–11.1)
POTASSIUM SERPL-SCNC: 4.6 MMOL/L (ref 3.5–5.1)
PREGNANCY, URINE: NEGATIVE
PROTEIN UA: 30 MG/DL
RBC # BLD: 4.73 M/CU MM (ref 4.2–5.4)
RBC URINE: 247 /HPF (ref 0–6)
SEGMENTED NEUTROPHILS ABSOLUTE COUNT: 8.2 K/CU MM
SEGMENTED NEUTROPHILS RELATIVE PERCENT: 82.5 % (ref 36–66)
SODIUM BLD-SCNC: 137 MMOL/L (ref 135–145)
SPECIFIC GRAVITY UA: 1.01 (ref 1–1.03)
SPECIFIC GRAVITY, URINE: 1.01 (ref 1–1.03)
SQUAMOUS EPITHELIAL: 2 /HPF
TOTAL IMMATURE NEUTOROPHIL: 0.04 K/CU MM
TOTAL NUCLEATED RBC: 0 K/CU MM
TOTAL PROTEIN: 7.1 GM/DL (ref 6.4–8.2)
TRICHOMONAS: ABNORMAL /HPF
UROBILINOGEN, URINE: 0.2 MG/DL (ref 0.2–1)
WBC # BLD: 9.9 K/CU MM (ref 4–10.5)
WBC UA: 51 /HPF (ref 0–5)

## 2022-07-25 PROCEDURE — 87086 URINE CULTURE/COLONY COUNT: CPT

## 2022-07-25 PROCEDURE — G0378 HOSPITAL OBSERVATION PER HR: HCPCS

## 2022-07-25 PROCEDURE — 87040 BLOOD CULTURE FOR BACTERIA: CPT

## 2022-07-25 PROCEDURE — 96365 THER/PROPH/DIAG IV INF INIT: CPT

## 2022-07-25 PROCEDURE — 2580000003 HC RX 258: Performed by: NURSE PRACTITIONER

## 2022-07-25 PROCEDURE — 6360000002 HC RX W HCPCS: Performed by: EMERGENCY MEDICINE

## 2022-07-25 PROCEDURE — 6370000000 HC RX 637 (ALT 250 FOR IP): Performed by: NURSE PRACTITIONER

## 2022-07-25 PROCEDURE — 87186 SC STD MICRODIL/AGAR DIL: CPT

## 2022-07-25 PROCEDURE — 81001 URINALYSIS AUTO W/SCOPE: CPT

## 2022-07-25 PROCEDURE — 2580000003 HC RX 258: Performed by: EMERGENCY MEDICINE

## 2022-07-25 PROCEDURE — 85025 COMPLETE CBC W/AUTO DIFF WBC: CPT

## 2022-07-25 PROCEDURE — 80053 COMPREHEN METABOLIC PANEL: CPT

## 2022-07-25 PROCEDURE — 81025 URINE PREGNANCY TEST: CPT

## 2022-07-25 PROCEDURE — 84703 CHORIONIC GONADOTROPIN ASSAY: CPT

## 2022-07-25 PROCEDURE — 96361 HYDRATE IV INFUSION ADD-ON: CPT

## 2022-07-25 PROCEDURE — 99285 EMERGENCY DEPT VISIT HI MDM: CPT

## 2022-07-25 PROCEDURE — 74177 CT ABD & PELVIS W/CONTRAST: CPT

## 2022-07-25 PROCEDURE — 83690 ASSAY OF LIPASE: CPT

## 2022-07-25 PROCEDURE — 83605 ASSAY OF LACTIC ACID: CPT

## 2022-07-25 PROCEDURE — 96376 TX/PRO/DX INJ SAME DRUG ADON: CPT

## 2022-07-25 PROCEDURE — 87088 URINE BACTERIA CULTURE: CPT

## 2022-07-25 PROCEDURE — 96375 TX/PRO/DX INJ NEW DRUG ADDON: CPT

## 2022-07-25 PROCEDURE — 6360000004 HC RX CONTRAST MEDICATION: Performed by: EMERGENCY MEDICINE

## 2022-07-25 RX ORDER — SWAB
1 SWAB, NON-MEDICATED MISCELLANEOUS DAILY
Status: DISCONTINUED | OUTPATIENT
Start: 2022-07-26 | End: 2022-07-27 | Stop reason: HOSPADM

## 2022-07-25 RX ORDER — POLYETHYLENE GLYCOL 3350 17 G/17G
17 POWDER, FOR SOLUTION ORAL DAILY
Status: DISCONTINUED | OUTPATIENT
Start: 2022-07-26 | End: 2022-07-27 | Stop reason: HOSPADM

## 2022-07-25 RX ORDER — ONDANSETRON 2 MG/ML
4 INJECTION INTRAMUSCULAR; INTRAVENOUS ONCE
Status: COMPLETED | OUTPATIENT
Start: 2022-07-25 | End: 2022-07-25

## 2022-07-25 RX ORDER — SODIUM CHLORIDE 0.9 % (FLUSH) 0.9 %
5-40 SYRINGE (ML) INJECTION EVERY 12 HOURS SCHEDULED
Status: DISCONTINUED | OUTPATIENT
Start: 2022-07-25 | End: 2022-07-27 | Stop reason: HOSPADM

## 2022-07-25 RX ORDER — MORPHINE SULFATE 2 MG/ML
2 INJECTION, SOLUTION INTRAMUSCULAR; INTRAVENOUS
Status: DISCONTINUED | OUTPATIENT
Start: 2022-07-25 | End: 2022-07-27 | Stop reason: HOSPADM

## 2022-07-25 RX ORDER — IBUPROFEN 400 MG/1
800 TABLET ORAL ONCE
Status: COMPLETED | OUTPATIENT
Start: 2022-07-25 | End: 2022-07-25

## 2022-07-25 RX ORDER — 0.9 % SODIUM CHLORIDE 0.9 %
1000 INTRAVENOUS SOLUTION INTRAVENOUS ONCE
Status: COMPLETED | OUTPATIENT
Start: 2022-07-25 | End: 2022-07-25

## 2022-07-25 RX ORDER — IBUPROFEN 800 MG/1
800 TABLET ORAL EVERY 8 HOURS
Status: DISCONTINUED | OUTPATIENT
Start: 2022-07-26 | End: 2022-07-27 | Stop reason: HOSPADM

## 2022-07-25 RX ORDER — 0.9 % SODIUM CHLORIDE 0.9 %
30 INTRAVENOUS SOLUTION INTRAVENOUS ONCE
Status: COMPLETED | OUTPATIENT
Start: 2022-07-25 | End: 2022-07-25

## 2022-07-25 RX ORDER — MORPHINE SULFATE 4 MG/ML
4 INJECTION, SOLUTION INTRAMUSCULAR; INTRAVENOUS ONCE
Status: COMPLETED | OUTPATIENT
Start: 2022-07-25 | End: 2022-07-25

## 2022-07-25 RX ORDER — SODIUM CHLORIDE 9 MG/ML
INJECTION, SOLUTION INTRAVENOUS PRN
Status: DISCONTINUED | OUTPATIENT
Start: 2022-07-25 | End: 2022-07-27 | Stop reason: HOSPADM

## 2022-07-25 RX ORDER — SODIUM CHLORIDE 0.9 % (FLUSH) 0.9 %
5-40 SYRINGE (ML) INJECTION PRN
Status: DISCONTINUED | OUTPATIENT
Start: 2022-07-25 | End: 2022-07-27 | Stop reason: HOSPADM

## 2022-07-25 RX ORDER — ONDANSETRON 4 MG/1
4 TABLET, ORALLY DISINTEGRATING ORAL EVERY 6 HOURS PRN
Status: DISCONTINUED | OUTPATIENT
Start: 2022-07-25 | End: 2022-07-27 | Stop reason: HOSPADM

## 2022-07-25 RX ORDER — ACETAMINOPHEN 325 MG/1
650 TABLET ORAL EVERY 4 HOURS PRN
Status: DISCONTINUED | OUTPATIENT
Start: 2022-07-25 | End: 2022-07-27 | Stop reason: HOSPADM

## 2022-07-25 RX ORDER — SODIUM CHLORIDE 9 MG/ML
INJECTION, SOLUTION INTRAVENOUS CONTINUOUS
Status: DISCONTINUED | OUTPATIENT
Start: 2022-07-25 | End: 2022-07-26

## 2022-07-25 RX ORDER — DOCUSATE SODIUM 100 MG/1
100 CAPSULE, LIQUID FILLED ORAL 2 TIMES DAILY
Status: DISCONTINUED | OUTPATIENT
Start: 2022-07-25 | End: 2022-07-27 | Stop reason: HOSPADM

## 2022-07-25 RX ORDER — MORPHINE SULFATE 4 MG/ML
4 INJECTION, SOLUTION INTRAMUSCULAR; INTRAVENOUS
Status: DISCONTINUED | OUTPATIENT
Start: 2022-07-25 | End: 2022-07-27 | Stop reason: HOSPADM

## 2022-07-25 RX ORDER — ONDANSETRON 2 MG/ML
4 INJECTION INTRAMUSCULAR; INTRAVENOUS EVERY 6 HOURS PRN
Status: DISCONTINUED | OUTPATIENT
Start: 2022-07-25 | End: 2022-07-27 | Stop reason: HOSPADM

## 2022-07-25 RX ADMIN — SODIUM CHLORIDE 1000 ML: 9 INJECTION, SOLUTION INTRAVENOUS at 17:57

## 2022-07-25 RX ADMIN — SODIUM CHLORIDE 1000 ML: 9 INJECTION, SOLUTION INTRAVENOUS at 19:49

## 2022-07-25 RX ADMIN — MORPHINE SULFATE 4 MG: 4 INJECTION, SOLUTION INTRAMUSCULAR; INTRAVENOUS at 19:49

## 2022-07-25 RX ADMIN — SODIUM CHLORIDE: 9 INJECTION, SOLUTION INTRAVENOUS at 22:28

## 2022-07-25 RX ADMIN — MORPHINE SULFATE 4 MG: 4 INJECTION, SOLUTION INTRAMUSCULAR; INTRAVENOUS at 17:59

## 2022-07-25 RX ADMIN — ONDANSETRON 4 MG: 2 INJECTION INTRAMUSCULAR; INTRAVENOUS at 17:58

## 2022-07-25 RX ADMIN — SODIUM CHLORIDE 1986 ML: 9 INJECTION, SOLUTION INTRAVENOUS at 23:41

## 2022-07-25 RX ADMIN — CEFTRIAXONE SODIUM 1000 MG: 1 INJECTION, POWDER, FOR SOLUTION INTRAMUSCULAR; INTRAVENOUS at 18:01

## 2022-07-25 RX ADMIN — IOPAMIDOL 80 ML: 755 INJECTION, SOLUTION INTRAVENOUS at 17:34

## 2022-07-25 RX ADMIN — IBUPROFEN 800 MG: 400 TABLET, FILM COATED ORAL at 20:41

## 2022-07-25 ASSESSMENT — PAIN SCALES - GENERAL
PAINLEVEL_OUTOF10: 6
PAINLEVEL_OUTOF10: 8
PAINLEVEL_OUTOF10: 7
PAINLEVEL_OUTOF10: 4

## 2022-07-25 ASSESSMENT — PAIN - FUNCTIONAL ASSESSMENT
PAIN_FUNCTIONAL_ASSESSMENT: 0-10
PAIN_FUNCTIONAL_ASSESSMENT: 0-10

## 2022-07-25 ASSESSMENT — PAIN DESCRIPTION - LOCATION
LOCATION: FLANK
LOCATION: HEAD
LOCATION: HEAD

## 2022-07-25 ASSESSMENT — ENCOUNTER SYMPTOMS
SHORTNESS OF BREATH: 0
COUGH: 0
DIARRHEA: 0
NAUSEA: 0
VOMITING: 0
ABDOMINAL PAIN: 1
EYE REDNESS: 0

## 2022-07-25 ASSESSMENT — PAIN DESCRIPTION - ORIENTATION: ORIENTATION: RIGHT

## 2022-07-25 NOTE — ED PROVIDER NOTES
EMERGENCY DEPARTMENT ENCOUNTER    Patient: Titus Vargas  MRN: [id number]  : 1985  Date of Evaluation: 2022  ED Provider:  Erma Bellamy MD    [address] [place] [address]  Chief Complaint   Patient presents with    Fever     Had stent placed in right kidney 10 days ago, believes infection is back     Abdominal Pain       HPI  Titus Vargas is a 43 y.o. female who presents with complaints of moderate to severe, constant, sharp and crampy right lower abdominal right flank pain and fever with onset within the last 24 hours. Abdominal pain worsened with palpation. No other known modifying factors. Does report some mild nausea without emesis. Denies any other associated symptoms or complaints or concerns. Of note, the patient had recent right-sided ureteral stones and did undergo right-sided ureteral stent placement over 2 weeks ago. She reports stents are still in place. She has also previously had both her gallbladder and appendix surgically removed.     REVIEW OF SYSTEMS    I have reviewed the nursing notes    Constitutional: negative for fever, chills, weight change, change in appetite  Neurological: negative for HA, lightheadedness, numbness, weakness  Ophthalmic: negative for vision change  ENT: negative for congestion, runny nose, sore throat  Cardiovascular: negative for chest pain, palpitations  Respiratory: negative for SOB, cough  GI: negative for vomiting, diarrhea, constipation, hematemesis, hematochezia, melena   : negative for dysuria, hematuria, vaginal bleeding or discharge  Musculoskeletal: negative for myalgias, decreased ROM, joint swelling  Dermatological: negative for rash, pruritis  Endocrine: negative for temperature intolerance, diaphoresis  Hematological: negative for anemia, bleeding      PAST MEDICAL HISTORY  Past Medical History:   Diagnosis Date    Acute appendicitis 2012    Chemical pregnancy     Irregular menses     Kidney infection     Kidney stone Kidney stones 2022    Obesity     Pregnant     Prior pregnancy with fetal demise and current pregnancy 2007    36Wks       CURRENT MEDICATIONS  [unfilled]    ALLERGIES  No Known Allergies    SURGICAL HISTORY  Past Surgical History:   Procedure Laterality Date    APPENDECTOMY      BLADDER SURGERY Right 2022    CYSTOSCOPY RIGHT  RETROGRADE PYELOGRAM STENT INSERTION performed by Faith Lopez MD at [facility]  04/10/12    OTHER SURGICAL HISTORY  2010    lump removed from right axilla    OTHER SURGICAL HISTORY  2010    lump removed from groin area    OTHER SURGICAL HISTORY Right 2013    Excision of lesion to R axilla    OTHER SURGICAL HISTORY Right 2013    Closure right axillary wound       FAMILY HISTORY  Family History   Problem Relation Age of Onset    High Blood Pressure Father     High Cholesterol Father     Cancer Sister         Lymphoma    High Cholesterol Mother     Diabetes Paternal Aunt     Cancer Maternal Grandfather     Cancer Paternal Grandmother     Stroke Paternal Grandmother     Arthritis Paternal Grandfather        SOCIAL HISTORY  Social History     Socioeconomic History    Marital status:      Spouse name: brendon    Number of children: 1    Years of education: None    Highest education level: None   Occupational History    Occupation: unemployed   Tobacco Use    Smoking status: Former     Packs/day: 1.00     Years: 15.00     Pack years: 15.00     Types: Cigarettes     Quit date: 2017     Years since quittin.6    Smokeless tobacco: Never    Tobacco comments:     quit when she found out she was pregnant   Vaping Use    Vaping Use: Never used   Substance and Sexual Activity    Alcohol use: No    Drug use: No    Sexual activity: Yes     Partners: Male   Social History Narrative    Do you donate blood or plasma? No    Caffeine intake? 3 can of pop    Advance directive?  No    Is blood transfusion acceptable in an emergency? Yes             Social Determinants of Health     Financial Resource Strain: Low Risk     Difficulty of Paying Living Expenses: Not very hard   Food Insecurity: Food Insecurity Present    Worried About Running Out of Food in the Last Year: Sometimes true    Ran Out of Food in the Last Year: Sometimes true   Transportation Needs: No Transportation Needs    Lack of Transportation (Medical): No    Lack of Transportation (Non-Medical): No   Housing Stability: High Risk    Unable to Pay for Housing in the Last Year: Yes    Unstable Housing in the Last Year: Yes           **Past medical, family and social histories reviewed and verified by me**      PHYSICAL EXAM  VITAL SIGNS:   ED Triage Vitals [07/25/22 1228]   Enc Vitals Group      BP (!) 152/89      Heart Rate (!) 118      Resp 16      Temp (!) 102.5 °F (39.2 °C)      Temp Source Oral      SpO2 98 %      Weight       Height       Head Circumference       Peak Flow       Pain Score       Pain Loc       Pain Edu? Excl. in [address]? Vitals during ED course were reviewed and are as charted. Constitutional: Minimal distress, Non-toxic appearance    Eyes: Conjunctiva normal, No discharge    HENT: Normocephalic, Atraumatic, Bilateral external ears normal, posterior oropharynx is nonerythematous and without exudate, uvula is midline, oropharynx moist    Neck: Supple, no stridor, no grossly visible or palpable masses    Cardiovascular: Regular rate and rhythm, No murmurs, No rubs, No gallops    Pulmonary/Chest:  Normal breath sounds, No respiratory distress or accessory muscle use, No wheezing, crackles or rhonchi.      Abdomen: Right upper and right lower quadrant abdominal tenderness to palpation without peritoneal signs, otherwise abdomen is soft, nondistended and nonrigid, No tenderness or peritoneal signs elsewhere, No masses, normal bowel sounds    Back: Right-sided CVA tenderness, no midline point tenderness, No paraspinous muscle American >60 >60 mL/min/1.73m2    GFR African American >60 >60 mL/min/1.73m2    Anion Gap 11 4 - 16   Urinalysis   Result Value Ref Range    Color, UA YELLOW YELLOW    Clarity, UA CLEAR CLEAR    Glucose, Urine NEGATIVE NEGATIVE MG/DL    Bilirubin Urine NEGATIVE NEGATIVE MG/DL    Ketones, Urine NEGATIVE NEGATIVE MG/DL    Specific Gravity, UA 1.015 1.001 - 1.035    Blood, Urine LARGE (A) NEGATIVE    pH, Urine 7.5 5.0 - 8.0    Protein, UA 30 (A) NEGATIVE MG/DL    Urobilinogen, Urine 0.2 0.2 - 1.0 MG/DL    Nitrite Urine, Quantitative POSITIVE (A) NEGATIVE    Leukocyte Esterase, Urine SMALL (A) NEGATIVE    RBC,  (H) 0 - 6 /HPF    WBC, UA 51 (H) 0 - 5 /HPF    Bacteria, UA RARE (A) NEGATIVE /HPF    Squam Epithel, UA 2 /HPF    Mucus, UA RARE (A) NEGATIVE HPF    Trichomonas, UA NONE SEEN NONE SEEN /HPF   HCG Serum, Qualitative   Result Value Ref Range    hCG Qual NEGATIVE    Lipase   Result Value Ref Range    Lipase 22 13 - 60 IU/L   Pregnancy, Urine   Result Value Ref Range    Pregnancy, Urine NEGATIVE NEGATIVE    Specific Gravity, Urine 1.015 1.001 - 1.035    Interpretation HCG METHOD LIMITATIONS:    Lactate, Sepsis   Result Value Ref Range    Lactic Acid, Sepsis 1.5 0.5 - 1.9 mMOL/L          ABNORMAL LABS:  Labs Reviewed   CBC WITH AUTO DIFFERENTIAL - Abnormal; Notable for the following components:       Result Value    Hemoglobin 11.7 (*)     MCH 24.7 (*)     MCHC 29.9 (*)     Segs Relative 82.5 (*)     Lymphocytes % 8.1 (*)     Monocytes % 7.9 (*)     All other components within normal limits   COMPREHENSIVE METABOLIC PANEL W/ REFLEX TO MG FOR LOW K - Abnormal; Notable for the following components:    Glucose 108 (*)     All other components within normal limits   URINALYSIS - Abnormal; Notable for the following components:    Blood, Urine LARGE (*)     Protein, UA 30 (*)     Nitrite Urine, Quantitative POSITIVE (*)     Leukocyte Esterase, Urine SMALL (*)     RBC,  (*)     WBC, UA 51 (*)     Bacteria, UA RARE (*)     Mucus, UA RARE (*)     All other components within normal limits   CULTURE, BLOOD 1   CULTURE, BLOOD 2   CULTURE, URINE   HCG, SERUM, QUALITATIVE   LIPASE   PREGNANCY, URINE   LACTATE, SEPSIS         IMAGING STUDIES ORDERED:  CT ABDOMEN PELVIS W IV CONTRAST  IP CONSULT TO UROLOGY  IP CONSULT TO HOSPITALIST  PLACE IN OBSERVATION SERVICE  VITAL SIGNS  PATIENT MAY SHOWER  INTAKE AND OUTPUT  DAILY WEIGHTS  INTAKE AND OUTPUT  INTAKE AND OUTPUT  INTAKE AND OUTPUT  FULL CODE  DIET NPO  ACTIVITY AS TOLERATED    I have personally viewed the imaging studies. The radiologist interpretation is:   CT ABDOMEN PELVIS W IV CONTRAST Additional Contrast? None   Final Result   1. Interval placement of a right double-J nephroureteral stent with the   proximal tip coiled within the right upper renal calices and the distal tip   curled within the lumen of the bladder. 2. Redemonstration of a 1 cm calculus within the dilated right renal pelvis. Largest calculus within the right upper pole collecting system as well as the   4 mm calculus in the right proximal ureter are no longer identified. 3. Additional bilateral nonobstructing nephrolithiasis. 4. Persistent stable mild right-sided hydroureteronephrosis and similar   degree of perinephric inflammatory/reactive changes. 5. Additional findings stable from prior.                MEDICATIONS ADMINISTERED:  Medications   prenatal vitamin 28-0.8 MG tablet 1 tablet (has no administration in time range)   docusate sodium (COLACE) capsule 100 mg (100 mg Oral Not Given 7/26/22 0024)   acetaminophen (TYLENOL) tablet 650 mg (650 mg Oral Given 7/26/22 0025)   0.9 % sodium chloride infusion ( IntraVENous New Bag 7/25/22 2228)   morphine (PF) injection 2 mg (has no administration in time range)     Or   morphine sulfate (PF) injection 4 mg (has no administration in time range)   ondansetron (ZOFRAN-ODT) disintegrating tablet 4 mg (has no administration in time range)     Or ondansetron (ZOFRAN) injection 4 mg (has no administration in time range)   polyethylene glycol (GLYCOLAX) packet 17 g (has no administration in time range)   bisacodyl (DULCOLAX) EC tablet 5 mg (has no administration in time range)   ibuprofen (ADVIL;MOTRIN) tablet 800 mg (has no administration in time range)   sodium chloride flush 0.9 % injection 5-40 mL (5 mLs IntraVENous Not Given 7/25/22 2341)   sodium chloride flush 0.9 % injection 5-40 mL (has no administration in time range)   0.9 % sodium chloride infusion (has no administration in time range)   cefTRIAXone (ROCEPHIN) 1,000 mg in dextrose 5 % 50 mL IVPB mini-bag (has no administration in time range)   cefTRIAXone (ROCEPHIN) 1000 mg IVPB in 50 mL D5W minibag (0 mg IntraVENous Stopped 7/25/22 1831)   morphine sulfate (PF) injection 4 mg (4 mg IntraVENous Given 7/25/22 1759)   ondansetron (ZOFRAN) injection 4 mg (4 mg IntraVENous Given 7/25/22 1758)   0.9 % sodium chloride bolus (0 mLs IntraVENous Stopped 7/25/22 1949)   iopamidol (ISOVUE-370) 76 % injection 80 mL (80 mLs IntraVENous Given 7/25/22 1734)   0.9 % sodium chloride bolus (1,000 mLs IntraVENous New Bag 7/25/22 1949)   morphine sulfate (PF) injection 4 mg (4 mg IntraVENous Given 7/25/22 1949)   ibuprofen (ADVIL;MOTRIN) tablet 800 mg (800 mg Oral Given 7/25/22 2041)   0.9 % sodium chloride IV bolus 1,986 mL (0 mLs IntraVENous Stopped 7/25/22 2343)         COURSE & MEDICAL DECISION MAKING    75-year-old female with right flank pain and fever appears to be secondary to pyelonephritis in the setting of a right ureteral stone and stent. Otherwise hemodynamically stable.     Differential diagnoses considered included, but were not limited to, acute appendicitis, acute cholecystitis/cholangitis, acute hepatitis, Ptim-Sadw-Khfcap Disease, acute pancreatitis, acute coronary syndrome, acute intra-abdominal catastrophe, acute vascular emergency, bowel obstruction, perforated bowel, incarcerated or strangulated hernia, colitis, diverticulitis, ischemic bowel, cystitis, pyelonephritis, kidney stone, acute ovarian torsion/pathology, salpingitis/TOA/pelvic inflammatory disease and acute ectopic pregnancy. I discussed case with Dr. Eliezer Hamman who advised hospitalization and keep n.p.o. after midnight tonight. Additional workup and treatment in the ED as documented above. Pt will be admitted for further evaluation and treatment. I discussed the case with the hospitalist, who agreed to admit the patient. Plan discussed with pt and/or family who expressed understanding and agreement with plan. Admitted in stable condition. Clinical Impression:  1. Pyelonephritis    2. Ureterolithiasis        Disposition referral (if applicable):  No follow-up provider specified. Disposition medications (if applicable):  Current Discharge Medication List          ED Provider Disposition Time  DISPOSITION Admitted 07/25/2022 08:04:08 PM        Electronically signed by: Franchesca Segal M.D., 7/26/2022  12:45 AM    This dictation was created with voice recognition software. While attempts have been made to review the dictation as it is transcribed, on occasion the spoken word can be misinterpreted by the technology leading to omissions or inappropriate words, phrases or sentences.         Norah Pearce MD  07/26/22 9828

## 2022-07-25 NOTE — CARE COORDINATION
- Room 14 --a possible Re-Admission candidate. The last Admission  (July 15 )or pyelonephritis and obstructive uropathy with Sepsis. Today she comes to ER with Fever and Right Flank/ abdominal pain and is Tachycardic.   She is currently being evaluated     --19:45--The decision was made to admit pt for reasons cited above and with results of the [facility] / CM

## 2022-07-26 PROCEDURE — 94761 N-INVAS EAR/PLS OXIMETRY MLT: CPT

## 2022-07-26 PROCEDURE — 6370000000 HC RX 637 (ALT 250 FOR IP): Performed by: NURSE PRACTITIONER

## 2022-07-26 PROCEDURE — G0378 HOSPITAL OBSERVATION PER HR: HCPCS

## 2022-07-26 PROCEDURE — 96367 TX/PROPH/DG ADDL SEQ IV INF: CPT

## 2022-07-26 PROCEDURE — 6360000002 HC RX W HCPCS: Performed by: NURSE PRACTITIONER

## 2022-07-26 PROCEDURE — 6370000000 HC RX 637 (ALT 250 FOR IP): Performed by: STUDENT IN AN ORGANIZED HEALTH CARE EDUCATION/TRAINING PROGRAM

## 2022-07-26 PROCEDURE — 6360000002 HC RX W HCPCS: Performed by: PHYSICIAN ASSISTANT

## 2022-07-26 PROCEDURE — 96361 HYDRATE IV INFUSION ADD-ON: CPT

## 2022-07-26 PROCEDURE — 96366 THER/PROPH/DIAG IV INF ADDON: CPT

## 2022-07-26 PROCEDURE — 2580000003 HC RX 258: Performed by: NURSE PRACTITIONER

## 2022-07-26 RX ORDER — ENOXAPARIN SODIUM 100 MG/ML
40 INJECTION SUBCUTANEOUS DAILY
Status: DISCONTINUED | OUTPATIENT
Start: 2022-07-26 | End: 2022-07-27 | Stop reason: HOSPADM

## 2022-07-26 RX ORDER — OXYCODONE HYDROCHLORIDE 5 MG/1
5 TABLET ORAL EVERY 4 HOURS PRN
Status: DISCONTINUED | OUTPATIENT
Start: 2022-07-26 | End: 2022-07-27 | Stop reason: HOSPADM

## 2022-07-26 RX ORDER — KETOROLAC TROMETHAMINE 30 MG/ML
30 INJECTION, SOLUTION INTRAMUSCULAR; INTRAVENOUS EVERY 6 HOURS PRN
Status: DISCONTINUED | OUTPATIENT
Start: 2022-07-26 | End: 2022-07-27 | Stop reason: HOSPADM

## 2022-07-26 RX ORDER — CIPROFLOXACIN 2 MG/ML
400 INJECTION, SOLUTION INTRAVENOUS EVERY 12 HOURS
Status: DISCONTINUED | OUTPATIENT
Start: 2022-07-26 | End: 2022-07-26

## 2022-07-26 RX ORDER — CIPROFLOXACIN 500 MG/1
500 TABLET, FILM COATED ORAL EVERY 12 HOURS SCHEDULED
Status: DISCONTINUED | OUTPATIENT
Start: 2022-07-26 | End: 2022-07-27 | Stop reason: HOSPADM

## 2022-07-26 RX ADMIN — IBUPROFEN 800 MG: 800 TABLET, FILM COATED ORAL at 12:07

## 2022-07-26 RX ADMIN — CEFTRIAXONE SODIUM 1000 MG: 1 INJECTION, POWDER, FOR SOLUTION INTRAMUSCULAR; INTRAVENOUS at 08:27

## 2022-07-26 RX ADMIN — CIPROFLOXACIN 400 MG: 2 INJECTION, SOLUTION INTRAVENOUS at 12:12

## 2022-07-26 RX ADMIN — ACETAMINOPHEN 650 MG: 325 TABLET ORAL at 08:35

## 2022-07-26 RX ADMIN — POLYETHYLENE GLYCOL (3350) 17 G: 17 POWDER, FOR SOLUTION ORAL at 08:29

## 2022-07-26 RX ADMIN — IBUPROFEN 800 MG: 800 TABLET, FILM COATED ORAL at 21:02

## 2022-07-26 RX ADMIN — SODIUM CHLORIDE: 9 INJECTION, SOLUTION INTRAVENOUS at 05:22

## 2022-07-26 RX ADMIN — SODIUM CHLORIDE: 9 INJECTION, SOLUTION INTRAVENOUS at 09:38

## 2022-07-26 RX ADMIN — DOCUSATE SODIUM 100 MG: 100 CAPSULE, LIQUID FILLED ORAL at 08:29

## 2022-07-26 RX ADMIN — CIPROFLOXACIN HYDROCHLORIDE 500 MG: 500 TABLET, FILM COATED ORAL at 21:02

## 2022-07-26 RX ADMIN — BISACODYL 5 MG: 5 TABLET, COATED ORAL at 08:29

## 2022-07-26 RX ADMIN — ACETAMINOPHEN 650 MG: 325 TABLET ORAL at 00:25

## 2022-07-26 ASSESSMENT — PAIN SCALES - WONG BAKER
WONGBAKER_NUMERICALRESPONSE: 0

## 2022-07-26 ASSESSMENT — PAIN SCALES - GENERAL
PAINLEVEL_OUTOF10: 1
PAINLEVEL_OUTOF10: 1
PAINLEVEL_OUTOF10: 4

## 2022-07-26 ASSESSMENT — PAIN DESCRIPTION - LOCATION
LOCATION: HEAD
LOCATION: HEAD

## 2022-07-26 ASSESSMENT — PAIN DESCRIPTION - DESCRIPTORS: DESCRIPTORS: ACHING

## 2022-07-26 ASSESSMENT — PAIN DESCRIPTION - ORIENTATION
ORIENTATION: RIGHT;LEFT
ORIENTATION: RIGHT;LEFT

## 2022-07-26 NOTE — H&P
V2.0  History and Physical      Name:  Lo Chester /Age/Sex: 1985  (43 y.o. female)   MRN & CSN:  [id number] & [id number] Encounter Date/Time: 2022 8:18 PM EDT   Location:  ED14/ED-14 PCP: Sanaz Larkin [address] [place] [address] Crossing Day: 1    Assessment and Plan:   Lo Chester is a 43 y.o. female with a pmh as noted below presents with abdominal pain, urolithiasis    Sepsis secondary to UTI  Urinary tract infection, febrile  Right ureteral calculus  Obstructive uropathy  CT abdomen pelvis interval placement of right double-J nephroureteral stent with proximal tip coiled within the right upper renal calculi stent distal tip curled within the lumen of the bladder. Redemonstration of 1 cm calculus noted as well as 4 mm calculus in the right proximal ureter  - UA with large blood, small leukocytes, positive for nitrates  Urine culture pending  -White count 9.9, T-max 102.5  HDS  - start rocephin  - BCx, UCx pending, follow sensitives   Received sepsis fluid bolus in ED, continue maintenance IV overnight, lactate within normal limits, trend per sepsis protocol    Chronic Conditions: continue home medication as ordered  . Migraine: Continue NSAIDS with fluid    All testing  and results reviewed with patient . All questions answered. Patient and family voiced understanding    This patient was seen and examined in conjunction with Dr. Alfie Johnson. He/She was agreeable with the plan and management as dictated above.     Disposition:   Expected Disposition: Home  Estimated D/C: 2 days     Diet No diet orders on file   GI Prophylaxis  [x] PPI,  [] H2 Blocker,  [] Carafate,  [] Diet/Tube Feeds   DVT Prophylaxis [] Lovenox, []  Heparin, [] SCDs, [x] Ambulation,  [] NOAC   Code Status Prior   Surrogate Decision Maker/ POA          History from:     patient, electronic medical record,     History of Present Illness:     Chief Complaint: UTI (urinary tract infection)  Lo Chester is a 43 y.o. female who presents with complaints of moderate to severe, constant, crampy right lower quadrant and right flank pain with fever acute onset last 24 hours context this patient has history of right-sided ureteral stones and did undergo right-sided stent placement over 2 weeks ago she reports the stents are still in place. Reports history of previously having within the gallbladder and appendix surgically removed. Review of Systems: Need 10 Elements   Review of Systems   Constitutional:  Positive for fatigue. Negative for activity change, appetite change and diaphoresis. HENT:  Negative for congestion and postnasal drip. Eyes:  Negative for redness and visual disturbance. Respiratory:  Negative for cough and shortness of breath. Cardiovascular:  Negative for chest pain and palpitations. Gastrointestinal:  Positive for abdominal pain. Negative for diarrhea, nausea and vomiting. Endocrine: Negative for cold intolerance and heat intolerance. Genitourinary:  Positive for dysuria. Negative for difficulty urinating. Musculoskeletal:  Negative for joint swelling and neck pain. Skin: Negative. Neurological:  Negative for dizziness and speech difficulty. Psychiatric/Behavioral:  Negative for agitation and confusion. Objective: Intake/Output Summary (Last 24 hours) at 7/25/2022 2018  Last data filed at 7/25/2022 1949  Gross per 24 hour   Intake 1000 ml   Output --   Net 1000 ml      Vitals:   Vitals:    07/25/22 1831 07/25/22 1832 07/25/22 1855 07/25/22 1947   BP:    (!) 143/96   Pulse: 97 (!) 101  (!) 112   Resp: 19 24  20   Temp:   99.4 °F (37.4 °C) 99.6 °F (37.6 °C)   TempSrc:    Oral   SpO2:    100%       Medications Prior to Admission     Prior to Admission medications    Medication Sig Start Date End Date Taking?  Authorizing Provider   ondansetron (ZOFRAN-ODT) 4 MG disintegrating tablet Take 1 tablet by mouth every 8 hours as needed for Nausea or Vomiting 7/10/22 8/9/22 Nayely Gilman MD   acetaminophen (TYLENOL) 500 MG tablet Take 1,000 mg by mouth every 6 hours as needed for Pain    Historical Provider, MD   docusate sodium (COLACE) 100 MG capsule Take 1 capsule by mouth 2 times daily 6/17/22   CHELSEA Hanks CNM   ibuprofen (ADVIL;MOTRIN) 800 MG tablet Take 1 tablet by mouth every 8 hours 6/17/22   CHELSEA Hanks CNM   blood glucose monitor strips Test 4 times a day & as needed for symptoms of irregular blood glucose. Dispense sufficient amount for indicated testing frequency plus additional to accommodate PRN testing needs. 4/15/22   Blake Hewitt MD   glucose monitoring (FREESTYLE FREEDOM) kit 1 kit by Does not apply route daily 4/15/22   Fang White MD   Lancets MISC 1 each by Does not apply route 4 times daily 4/15/22   Fang White MD   Prenatal MV-Min-FA-Omega-3 (PRENATAL GUMMIES/DHA & FA) 0.4-32.5 MG CHEW Take 1 tablet by mouth daily 11/11/21   Fang White MD       Physical Exam: Need 8 Elements   Physical Exam  Vitals and nursing note reviewed. Constitutional:       General: She is not in acute distress. Appearance: Normal appearance. HENT:      Head: Normocephalic. Nose: Nose normal.      Mouth/Throat:      Pharynx: Oropharynx is clear. Eyes:      Pupils: Pupils are equal, round, and reactive to light. Cardiovascular:      Rate and Rhythm: Normal rate and regular rhythm. Pulses: Normal pulses. Pulmonary:      Effort: Pulmonary effort is normal. No respiratory distress. Breath sounds: No wheezing or rhonchi. Abdominal:      General: Abdomen is flat. Bowel sounds are normal. There is no distension. Tenderness: There is abdominal tenderness in the right upper quadrant and right lower quadrant. Musculoskeletal:         General: Normal range of motion. Cervical back: Normal range of motion. Skin:     General: Skin is warm and dry.       Capillary Refill: Capillary refill takes less than 2 seconds. Neurological:      General: No focal deficit present. Mental Status: She is alert and oriented to person, place, and time. Psychiatric:         Mood and Affect: Mood normal.          Past Medical History:   PMHx   Past Medical History:   Diagnosis Date    Acute appendicitis 2012    Chemical pregnancy     Irregular menses     Kidney infection     Kidney stone     Kidney stones     Obesity     Pregnant     Prior pregnancy with fetal demise and current pregnancy 2007    36Wks     PSHX:  has a past surgical history that includes laparoscopic appendectomy (04/10/12); other surgical history (2010); other surgical history (2010); other surgical history (Right, 2013); Cholecystectomy; other surgical history (Right, 2013); Appendectomy; and Bladder surgery (Right, 2022). Allergies: No Known Allergies  Fam HX:  family history includes Arthritis in her paternal grandfather; Cancer in her maternal grandfather, paternal grandmother, and sister; Diabetes in her paternal aunt; High Blood Pressure in her father; High Cholesterol in her father and mother; Stroke in her paternal grandmother. Soc HX:   Social History     Socioeconomic History    Marital status:      Spouse name: brendon    Number of children: 1    Years of education: None    Highest education level: None   Occupational History    Occupation: unemployed   Tobacco Use    Smoking status: Former     Packs/day: 1.00     Years: 15.00     Pack years: 15.00     Types: Cigarettes     Quit date: 2017     Years since quittin.6    Smokeless tobacco: Never    Tobacco comments:     quit when she found out she was pregnant   Vaping Use    Vaping Use: Never used   Substance and Sexual Activity    Alcohol use: No    Drug use: No    Sexual activity: Yes     Partners: Male   Social History Narrative    Do you donate blood or plasma? No    Caffeine intake? 3 can of pop    Advance directive?  No    Is blood transfusion acceptable in an emergency? Yes             Social Determinants of Health     Financial Resource Strain: Low Risk     Difficulty of Paying Living Expenses: Not very hard   Food Insecurity: Food Insecurity Present    Worried About [address] in the Last Year: Sometimes true    Ran Out of Food in the Last Year: Sometimes true   Transportation Needs: No Transportation Needs    Lack of Transportation (Medical): No    Lack of Transportation (Non-Medical): No   Housing Stability: High Risk    Unable to Pay for Housing in the Last Year: Yes    Unstable Housing in the Last Year: Yes       Medications:   Medications:    sodium chloride  1,000 mL IntraVENous Once    ibuprofen  800 mg Oral Once      Infusions:   PRN Meds:      Labs      CBC:   Recent Labs     07/25/22  1224   WBC 9.9   HGB 11.7*        BMP:    Recent Labs     07/25/22  1224      K 4.6      CO2 24   BUN 9   CREATININE 0.8   GLUCOSE 108*     Hepatic:   Recent Labs     07/25/22  1224   AST 16   ALT 17   BILITOT 0.6   ALKPHOS 82     Lipids: No results found for: CHOL, HDL, TRIG  Hemoglobin A1C:   Lab Results   Component Value Date/Time    LABA1C 5.3 05/10/2022 11:20 AM     TSH: No results found for: TSH  Troponin: No results found for: TROPONINT  Lactic Acid: No results for input(s): LACTA in the last 72 hours. BNP: No results for input(s): PROBNP in the last 72 hours.   UA:  Lab Results   Component Value Date/Time    NITRU POSITIVE 07/25/2022 12:24 PM    COLORU YELLOW 07/25/2022 12:24 PM    WBCUA 51 07/25/2022 12:24 PM    RBCUA 247 07/25/2022 12:24 PM    MUCUS RARE 07/25/2022 12:24 PM    TRICHOMONAS NONE SEEN 07/25/2022 12:24 PM    YEAST RARE 04/29/2018 12:04 PM    BACTERIA RARE 07/25/2022 12:24 PM    CLARITYU CLEAR 07/25/2022 12:24 PM    SPECGRAV 1.015 07/25/2022 12:24 PM    LEUKOCYTESUR SMALL 07/25/2022 12:24 PM    UROBILINOGEN 0.2 07/25/2022 12:24 PM    BILIRUBINUR NEGATIVE 07/25/2022 12:24 PM    BLOODU LARGE 07/25/2022 12:24 PM    KETUA NEGATIVE 07/25/2022 12:24 PM     Urine Cultures:   Lab Results   Component Value Date/Time    LABURIN >100,000 CFU/ml 01/13/2022 10:53 AM     Blood Cultures: No results found for: BC  No results found for: BLOODCULT2  Organism:   Lab Results   Component Value Date/Time    ORG BHS Group B (Strep agalacticae) 05/24/2022 11:18 AM       Imaging/Diagnostics Last 24 Hours   CT ABDOMEN PELVIS W IV CONTRAST Additional Contrast? None    Result Date: 7/25/2022  EXAMINATION: CT OF THE ABDOMEN AND PELVIS WITH CONTRAST 7/25/2022 5:12 pm TECHNIQUE: CT of the abdomen and pelvis was performed with the administration of intravenous contrast. Multiplanar reformatted images are provided for review. Automated exposure control, iterative reconstruction, and/or weight based adjustment of the mA/kV was utilized to reduce the radiation dose to as low as reasonably achievable. COMPARISON: CT abdomen pelvis from July 7, 2022 HISTORY: ORDERING SYSTEM PROVIDED HISTORY: right flank pain fever TECHNOLOGIST PROVIDED HISTORY: Reason for exam:->right flank pain fever Additional Contrast?->None Decision Support Exception - unselect if not a suspected or confirmed emergency medical condition->Emergency Medical Condition (MA) Reason for Exam: right flank pain fever Relevant Medical/Surgical History: 80 ML ISOVUE 370 FINDINGS: Lower Chest: Visualized lung bases are clear. Liver: Normal morphology. No acute findings. Gallbladder: Cholecystectomy. No significant bile duct dilation. Spleen: Unremarkable. Pancreas: No focal parenchymal lesions. No duct dilation. Adrenal glands: Unremarkable. Kidneys: Interval placement of a right double-J nephroureteral stent with the proximal tip curled within right upper renal calices and the distal tip curled within the lumen of the bladder. There is redemonstration of a 1 cm calculus within the dilated right renal pelvis. Additional bilateral nonobstructing nephrolithiasis.   Largest calculus previously seen within the right upper pole collecting system no longer identified. In addition, the previously seen 4 mm calculus in the right proximal ureter is no longer identified. . Persistent stable mild right-sided hydroureteronephrosis. Similar degree of right kidney edema and surrounding reactive changes. No left-sided hydronephrosis. Bladder: No acute findings. GI tract: No significant wall thickening or bowel dilation. Status post appendectomy. No acute findings. Peritoneum/mesentery/retroperitoneum: No free air or free fluid. No masses or lymphadenopathy. Pelvis: Stable right adnexal cyst.  No free air or free fluid. No masses or lymphadenopathy. Vasculature: Scattered arterial atherosclerotic disease. No acute findings. Bones/Soft Tissues: No acute fracture or subluxation. No destructive osseous lesions. Soft tissues are unremarkable. 1. Interval placement of a right double-J nephroureteral stent with the proximal tip coiled within the right upper renal calices and the distal tip curled within the lumen of the bladder. 2. Redemonstration of a 1 cm calculus within the dilated right renal pelvis. Largest calculus within the right upper pole collecting system as well as the 4 mm calculus in the right proximal ureter are no longer identified. 3. Additional bilateral nonobstructing nephrolithiasis. 4. Persistent stable mild right-sided hydroureteronephrosis and similar degree of perinephric inflammatory/reactive changes. 5. Additional findings stable from prior. Electronically signed by CHELSEA Armas CNP on 7/25/2022 at 8:18 PM          This dictation was created with voice recognition software. While attempts have been made to review the dictation as it is transcribed, on occasion the spoken word can be misinterpreted by the technology leading to omissions or inappropriate words, phrases or sentences.      Electronically signed by CHELSEA Armas CNP on 7/25/2022 at 8:18 PM

## 2022-07-26 NOTE — CONSULTS
Pregnant     Prior pregnancy with fetal demise and current pregnancy 02/2007    36Wks     Past Surgical History:        Procedure Laterality Date    APPENDECTOMY      BLADDER SURGERY Right 7/8/2022    CYSTOSCOPY RIGHT  RETROGRADE PYELOGRAM STENT INSERTION performed by Kenneth Marin MD at [place] [address]  04/10/12    OTHER SURGICAL HISTORY  7/2010    lump removed from right axilla    OTHER SURGICAL HISTORY  9/2010    lump removed from groin area    OTHER SURGICAL HISTORY Right 4/11/2013    Excision of lesion to R axilla    OTHER SURGICAL HISTORY Right 4/29/2013    Closure right axillary wound     Current Medications:   Current Facility-Administered Medications: prenatal vitamin 28-0.8 MG tablet 1 tablet, 1 tablet, Oral, Daily  docusate sodium (COLACE) capsule 100 mg, 100 mg, Oral, BID  acetaminophen (TYLENOL) tablet 650 mg, 650 mg, Oral, Q4H PRN  0.9 % sodium chloride infusion, , IntraVENous, Continuous  morphine (PF) injection 2 mg, 2 mg, IntraVENous, Q2H PRN **OR** morphine sulfate (PF) injection 4 mg, 4 mg, IntraVENous, Q2H PRN  ondansetron (ZOFRAN-ODT) disintegrating tablet 4 mg, 4 mg, Oral, Q6H PRN **OR** ondansetron (ZOFRAN) injection 4 mg, 4 mg, IntraVENous, Q6H PRN  polyethylene glycol (GLYCOLAX) packet 17 g, 17 g, Oral, Daily  bisacodyl (DULCOLAX) EC tablet 5 mg, 5 mg, Oral, Daily  ibuprofen (ADVIL;MOTRIN) tablet 800 mg, 800 mg, Oral, Q8H  sodium chloride flush 0.9 % injection 5-40 mL, 5-40 mL, IntraVENous, 2 times per day  sodium chloride flush 0.9 % injection 5-40 mL, 5-40 mL, IntraVENous, PRN  0.9 % sodium chloride infusion, , IntraVENous, PRN  cefTRIAXone (ROCEPHIN) 1,000 mg in dextrose 5 % 50 mL IVPB mini-bag, 1,000 mg, IntraVENous, Q24H    Allergies:  Patient has no known allergies. Social History:   TOBACCO:   reports that she quit smoking about 4 years ago. Her smoking use included cigarettes. She has a 15.00 pack-year smoking history.  She has never used smokeless tobacco.  ETOH:   reports no history of alcohol use. DRUGS:   reports no history of drug use. Family History:       Problem Relation Age of Onset    High Blood Pressure Father     High Cholesterol Father     Cancer Sister         Lymphoma    High Cholesterol Mother     Diabetes Paternal Aunt     Cancer Maternal Grandfather     Cancer Paternal Grandmother     Stroke Paternal Grandmother     Arthritis Paternal Grandfather        REVIEW OF SYSTEMS:     CONSTITUTIONAL:  positive for  fevers and fatigue  RESPIRATORY:  negative  CARDIOVASCULAR:  negative  GASTROINTESTINAL:  positive for nausea and abdominal pain  GENITOURINARY:  positive for urgency    PHYSICAL EXAM:      VITALS:  /64   Pulse 79   Temp 97.3 °F (36.3 °C) (Oral)   Resp 16   Ht 5' 9\" (1.753 m)   Wt 200 lb 6 oz (90.9 kg)   LMP 2021   SpO2 99%   BMI 29.59 kg/m²      TEMPERATURE:  Current - Temp: 97.3 °F (36.3 °C);  Max - Temp  Av.7 °F (38.2 °C)  Min: 97.3 °F (36.3 °C)  Max: 102.5 °F (39.2 °C)  24HR BLOOD PRESSURE RANGE:  Systolic (72EWN), IXQ:051 , Min:101 , XXX:074   ; Diastolic (59IZG), ZDI:23, Min:60, Max:96    Physical Exam:  General appearance: alert, appears stated age, cooperative, fatigued, mild distress, and mildly obese  Head: Normocephalic, without obvious abnormality, atraumatic  Back:  Mild right CVA tenderness  Abdomen:  Soft, non-distended, mild TTP in RLQ    DATA:    WBC:    Lab Results   Component Value Date/Time    WBC 9.9 2022 12:24 PM     Hemoglobin/Hematocrit:    Lab Results   Component Value Date/Time    HGB 11.7 2022 12:24 PM    HCT 39.1 2022 12:24 PM     BMP:    Lab Results   Component Value Date/Time     2022 12:24 PM    K 4.6 2022 12:24 PM     2022 12:24 PM    CO2 24 2022 12:24 PM    BUN 9 2022 12:24 PM    LABALBU 4.1 2022 12:24 PM    CREATININE 0.8 2022 12:24 PM    CALCIUM 8.9 2022 12:24 PM    GFRAA >60 07/25/2022 12:24 PM    LABGLOM >60 07/25/2022 12:24 PM     Blood Culture: pending  Urine Culture: pending    Imaging:  CT ABDOMEN PELVIS WO CONTRAST    Result Date: 7/7/2022  EXAMINATION: CT OF THE ABDOMEN AND PELVIS WITHOUT CONTRAST 7/7/2022 7:56 pm TECHNIQUE: CT of the abdomen and pelvis was performed without the administration of intravenous contrast. Multiplanar reformatted images are provided for review. Automated exposure control, iterative reconstruction, and/or weight based adjustment of the mA/kV was utilized to reduce the radiation dose to as low as reasonably achievable. COMPARISON: CT scan dated May 10, 2017 HISTORY: ORDERING SYSTEM PROVIDED HISTORY: flank pain TECHNOLOGIST PROVIDED HISTORY: Reason for exam:->flank pain Is the patient pregnant?->No Reason for Exam: right flank pain FINDINGS: Lower Chest: The lung bases are clear. Organs: The liver, spleen, pancreas, and adrenal glands demonstrate no acute abnormality. The gallbladder is surgically absent. Multiple bilateral intrarenal calculi are present. Largest calculus measures 12 mm, within the upper pole collecting system of the right kidney. 10 mm calculus is present within right renal pelvis. A mild degree of right hydronephrosis and hydroureter is present, secondary to a 4 mm calculus within the proximal ureter. Left perinephric stranding is present related to obstructive uropathy. GI/Bowel: Stomach appears normal.  Small bowel appears normal without evidence of obstruction. Patient is status post prior appendectomy. No focal inflammatory change or wall thickening is present involving the large bowel. Pelvis: Urinary bladder is incompletely distended. Uterus appears grossly normal.  Right ovarian cyst is again noted, measuring 6.5 x 4 cm in AP and transverse dimension, previously measured 7.2 x 6.2 cm. . Peritoneum/Retroperitoneum: Trace amount of free fluid is present within the left pelvis. No free air is present.   No aneurysm formation noted. No pathological adenopathy is present. Bones/Soft Tissues: No acute osseous abnormality is present. 1. Mild degree of right hydronephrosis and hydroureter, secondary to a 4 mm calculus within the proximal right ureter. Right perinephric stranding is present, and may be related to obstructive uropathy or infection. Multiple additional right intrarenal calculi are present, largest measuring 12 mm 2. Nonobstructing left intrarenal calculi largest measuring 4 mm 3. 6.5 x 4 cm right adnexal cyst, decreased since the prior study. This was evaluated previously with ultrasound 4. Prior cholecystectomy and appendectomy     CT HEAD WO CONTRAST    Result Date: 7/7/2022  EXAMINATION: CT OF THE HEAD WITHOUT CONTRAST  7/7/2022 10:53 pm TECHNIQUE: CT of the head was performed without the administration of intravenous contrast. Automated exposure control, iterative reconstruction, and/or weight based adjustment of the mA/kV was utilized to reduce the radiation dose to as low as reasonably achievable. COMPARISON: CT head without contrast June 22, 2022. HISTORY: ORDERING SYSTEM PROVIDED HISTORY: head pain TECHNOLOGIST PROVIDED HISTORY: Has a \"code stroke\" or \"stroke alert\" been called? ->No Reason for exam:->head pain Is the patient pregnant?->No Reason for Exam: head pain FINDINGS: BRAIN/VENTRICLES: There is no acute intracranial hemorrhage, mass effect or midline shift. No abnormal extra-axial fluid collection. The gray-white differentiation is maintained without evidence of an acute infarct. There is no evidence of hydrocephalus. ORBITS: The visualized portion of the orbits demonstrate no acute abnormality. SINUSES: The visualized paranasal sinuses and mastoid air cells demonstrate no acute abnormality. SOFT TISSUES/SKULL:  No acute abnormality of the visualized skull or soft tissues. No acute intracranial abnormality.      CT ABDOMEN PELVIS W IV CONTRAST Additional Contrast? None    Result Date: 7/25/2022  EXAMINATION: CT OF THE ABDOMEN AND PELVIS WITH CONTRAST 7/25/2022 5:12 pm TECHNIQUE: CT of the abdomen and pelvis was performed with the administration of intravenous contrast. Multiplanar reformatted images are provided for review. Automated exposure control, iterative reconstruction, and/or weight based adjustment of the mA/kV was utilized to reduce the radiation dose to as low as reasonably achievable. COMPARISON: CT abdomen pelvis from July 7, 2022 HISTORY: ORDERING SYSTEM PROVIDED HISTORY: right flank pain fever TECHNOLOGIST PROVIDED HISTORY: Reason for exam:->right flank pain fever Additional Contrast?->None Decision Support Exception - unselect if not a suspected or confirmed emergency medical condition->Emergency Medical Condition (MA) Reason for Exam: right flank pain fever Relevant Medical/Surgical History: 80 ML ISOVUE 370 FINDINGS: Lower Chest: Visualized lung bases are clear. Liver: Normal morphology. No acute findings. Gallbladder: Cholecystectomy. No significant bile duct dilation. Spleen: Unremarkable. Pancreas: No focal parenchymal lesions. No duct dilation. Adrenal glands: Unremarkable. Kidneys: Interval placement of a right double-J nephroureteral stent with the proximal tip curled within right upper renal calices and the distal tip curled within the lumen of the bladder. There is redemonstration of a 1 cm calculus within the dilated right renal pelvis. Additional bilateral nonobstructing nephrolithiasis. Largest calculus previously seen within the right upper pole collecting system no longer identified. In addition, the previously seen 4 mm calculus in the right proximal ureter is no longer identified. . Persistent stable mild right-sided hydroureteronephrosis. Similar degree of right kidney edema and surrounding reactive changes. No left-sided hydronephrosis. Bladder: No acute findings. GI tract: No significant wall thickening or bowel dilation. Status post appendectomy.   No acute findings. Peritoneum/mesentery/retroperitoneum: No free air or free fluid. No masses or lymphadenopathy. Pelvis: Stable right adnexal cyst.  No free air or free fluid. No masses or lymphadenopathy. Vasculature: Scattered arterial atherosclerotic disease. No acute findings. Bones/Soft Tissues: No acute fracture or subluxation. No destructive osseous lesions. Soft tissues are unremarkable. 1. Interval placement of a right double-J nephroureteral stent with the proximal tip coiled within the right upper renal calices and the distal tip curled within the lumen of the bladder. 2. Redemonstration of a 1 cm calculus within the dilated right renal pelvis. Largest calculus within the right upper pole collecting system as well as the 4 mm calculus in the right proximal ureter are no longer identified. 3. Additional bilateral nonobstructing nephrolithiasis. 4. Persistent stable mild right-sided hydroureteronephrosis and similar degree of perinephric inflammatory/reactive changes. 5. Additional findings stable from prior. FL LESS THAN 1 HOUR    Result Date: 7/8/2022  EXAMINATION: SPOT FLUOROSCOPIC IMAGE 7/8/2022 3:13 pm TECHNIQUE: Fluoroscopy was provided by the radiology department for procedure. Radiologist was not present during examination. FLUOROSCOPY DOSE AND TYPE OR TIME AND EXPOSURES: 5.3 mGy. 0.3 minutes. COMPARISON: CT abdomen and pelvis 07/07/2022. HISTORY: ORDERING SYSTEM PROVIDED HISTORY: cysto TECHNOLOGIST PROVIDED HISTORY: Reason for exam:->cysto Reason for Exam: rt cysto Intraprocedural imaging. FINDINGS: A single spot image of the right urinary collecting system was obtained. The image demonstrates partial contrast opacification of the mid to distal ureter. No definite filling defect or extravasation. Intraprocedural fluoroscopic spot images as above. See separate procedure report for more information.        Assessment & Plan:      Esther Benedict is a 43y.o. year old female admitted 7/25/2022 for UTI, fevers. 1) Right Renal Calculi w Mild Hydronephrosis: S/p cystoscopy, right retrograde pyelogram, right ureteral stent placement 7/8/22. CT a/p 7/25/22: Interval placement of a right double-J nephroureteral stent with the proximal tip coiled within the right upper renal calices and the distal tip curled within the lumen of the bladder. Redemonstration of a 1 cm calculus within the dilated right renal pelvis. Largest calculus within the right upper pole collecting system as well as the 4 mm calculus in the right proximal ureter are no longer identified. Additional bilateral nonobstructing nephrolithiasis. Persistent stable mild right-sided hydroureteronephrosis and similar degree of perinephric inflammatory/reactive changes. Cr 0.8   No plan for cystoscopy, right ureteral stent exchange at this time. We will proceed with a right ureteroscopy/laser lithotripsy once her urine is clear of infection. 2) Febrile UTI/Pyelonephritis: UA w pos nit, small leuks, and large blood; urine cx pending   WBC 9.9, T-max of 101.8 overnight. On IV Rocephin; adjust as necessary per urine c&s    Will follow. Patient seen and examined, chart reviewed.      Electronically signed by Susan Davis PA-C on 7/26/2022 at 8:16 AM

## 2022-07-26 NOTE — PROGRESS NOTES
V2.0  [facility] [place]      Name:  Renu Gotti /Age/Sex: 1985  (43 y.o. female)   MRN & CSN:  [id number] & [id number] Encounter Date/Time: 2022 7:50 AM EDT    [facility]  [facility] PCP: Bety Miller, [address] Day: 2    Assessment and Plan:   Renu Gotti is a 43 y.o. female with past medical history of  kidney stones who presents with UTI (urinary tract infection)    R pyelonephritis, UTI  Sepsis  Right ureteral calculus  - sepsis evidenced by T max 102.5, tachycardia; HDS, LA WNL, no leukocytosis  -CT abdomen pelvis with interval placement of right double-J nephroureteral stent with proximal tip coiled within the right upper renal calculi stent distal tip curled within the lumen of the bladder. Redemonstration of 1 cm calculus noted as well as 4 mm calculus in the right proximal ureter  - UA with large blood, small leukocytes, 51 WBCs, positive for nitrites  - Ucx 22 with pansensitive E coli  - initially started on Rocephin, will switch to cipro given she has recurrent infection on cephalosporins  - BCx, UCx pending, follow sensitives   - continue IVF, PRN PO/IV narcotics, PRN toradol   - urology following, no plans for cystoscopy or ureteral stent exchange at this time, planning to proceed with right ureteroscopy/laser lithotripsy once urine is clear infection    Migraines   - continue PRN NSAIDs    This patient was seen and examined in conjunction/discussed with Dr. Courtney Reese. He was agreeable with the plan and management as dictated above. Current Living situation: home  Expected Disposition: same  Estimated D/C: 2-3 days    Diet ADULT DIET;  Regular   DVT Prophylaxis [x] Lovenox, []  Heparin, [] SCDs, [] Ambulation,  [] Eliquis, [] Xarelto []Coumadin   Code Status Full Code   Disposition Patient requires continued admission due to sepsis, UTI   Surrogate Decision Maker/ POA      Subjective:     Chief Complaint: Fever (Had stent Q2H PRN  ondansetron, 4 mg, Q6H PRN   Or  ondansetron, 4 mg, Q6H PRN  sodium chloride flush, 5-40 mL, PRN  sodium chloride, , PRN      Labs      Recent Results (from the past 24 hour(s))   CBC with Auto Differential    Collection Time: 07/25/22 12:24 PM   Result Value Ref Range    WBC 9.9 4.0 - 10.5 K/CU MM    RBC 4.73 4.2 - 5.4 M/CU MM    Hemoglobin 11.7 (L) 12.5 - 16.0 GM/DL    Hematocrit 39.1 37 - 47 %    MCV 82.7 78 - 100 FL    MCH 24.7 (L) 27 - 31 PG    MCHC 29.9 (L) 32.0 - 36.0 %    RDW 14.8 11.7 - 14.9 %    Platelets 706 002 - 670 K/CU MM    MPV 9.8 7.5 - 11.1 FL    Differential Type AUTOMATED DIFFERENTIAL     Segs Relative 82.5 (H) 36 - 66 %    Lymphocytes % 8.1 (L) 24 - 44 %    Monocytes % 7.9 (H) 0 - 4 %    Eosinophils % 0.7 0 - 3 %    Basophils % 0.4 0 - 1 %    Segs Absolute 8.2 K/CU MM    Lymphocytes Absolute 0.8 K/CU MM    Monocytes Absolute 0.8 K/CU MM    Eosinophils Absolute 0.1 K/CU MM    Basophils Absolute 0.0 K/CU MM    Nucleated RBC % 0.0 %    Total Nucleated RBC 0.0 K/CU MM    Total Immature Neutrophil 0.04 K/CU MM    Immature Neutrophil % 0.4 0 - 0.43 %   Comprehensive Metabolic Panel w/ Reflex to MG    Collection Time: 07/25/22 12:24 PM   Result Value Ref Range    Sodium 137 135 - 145 MMOL/L    Potassium 4.6 3.5 - 5.1 MMOL/L    Chloride 102 99 - 110 mMol/L    CO2 24 21 - 32 MMOL/L    BUN 9 6 - 23 MG/DL    Creatinine 0.8 0.6 - 1.1 MG/DL    Glucose 108 (H) 70 - 99 MG/DL    Calcium 8.9 8.3 - 10.6 MG/DL    Albumin 4.1 3.4 - 5.0 GM/DL    Total Protein 7.1 6.4 - 8.2 GM/DL    Total Bilirubin 0.6 0.0 - 1.0 MG/DL    ALT 17 10 - 40 U/L    AST 16 15 - 37 IU/L    Alkaline Phosphatase 82 40 - 129 IU/L    GFR Non-African American >60 >60 mL/min/1.73m2    GFR African American >60 >60 mL/min/1.73m2    Anion Gap 11 4 - 16   Urinalysis    Collection Time: 07/25/22 12:24 PM   Result Value Ref Range    Color, UA YELLOW YELLOW    Clarity, UA CLEAR CLEAR    Glucose, Urine NEGATIVE NEGATIVE MG/DL    Bilirubin Urine NEGATIVE NEGATIVE MG/DL    Ketones, Urine NEGATIVE NEGATIVE MG/DL    Specific Gravity, UA 1.015 1.001 - 1.035    Blood, Urine LARGE (A) NEGATIVE    pH, Urine 7.5 5.0 - 8.0    Protein, UA 30 (A) NEGATIVE MG/DL    Urobilinogen, Urine 0.2 0.2 - 1.0 MG/DL    Nitrite Urine, Quantitative POSITIVE (A) NEGATIVE    Leukocyte Esterase, Urine SMALL (A) NEGATIVE    RBC,  (H) 0 - 6 /HPF    WBC, UA 51 (H) 0 - 5 /HPF    Bacteria, UA RARE (A) NEGATIVE /HPF    Squam Epithel, UA 2 /HPF    Mucus, UA RARE (A) NEGATIVE HPF    Trichomonas, UA NONE SEEN NONE SEEN /HPF   HCG Serum, Qualitative    Collection Time: 07/25/22 12:24 PM   Result Value Ref Range    hCG Qual NEGATIVE    Lipase    Collection Time: 07/25/22 12:24 PM   Result Value Ref Range    Lipase 22 13 - 60 IU/L   Pregnancy, Urine    Collection Time: 07/25/22 12:24 PM   Result Value Ref Range    Pregnancy, Urine NEGATIVE NEGATIVE    Specific Gravity, Urine 1.015 1.001 - 1.035    Interpretation HCG METHOD LIMITATIONS:    Lactate, Sepsis    Collection Time: 07/25/22  7:03 PM   Result Value Ref Range    Lactic Acid, Sepsis 1.5 0.5 - 1.9 mMOL/L        Imaging/Diagnostics Last 24 Hours   CT ABDOMEN PELVIS W IV CONTRAST Additional Contrast? None    Result Date: 7/25/2022  EXAMINATION: CT OF THE ABDOMEN AND PELVIS WITH CONTRAST 7/25/2022 5:12 pm TECHNIQUE: CT of the abdomen and pelvis was performed with the administration of intravenous contrast. Multiplanar reformatted images are provided for review. Automated exposure control, iterative reconstruction, and/or weight based adjustment of the mA/kV was utilized to reduce the radiation dose to as low as reasonably achievable.  COMPARISON: CT abdomen pelvis from July 7, 2022 HISTORY: ORDERING SYSTEM PROVIDED HISTORY: right flank pain fever TECHNOLOGIST PROVIDED HISTORY: Reason for exam:->right flank pain fever Additional Contrast?->None Decision Support Exception - unselect if not a suspected or confirmed emergency medical condition->Emergency Medical Condition (MA) Reason for Exam: right flank pain fever Relevant Medical/Surgical History: 80 ML ISOVUE 370 FINDINGS: Lower Chest: Visualized lung bases are clear. Liver: Normal morphology. No acute findings. Gallbladder: Cholecystectomy. No significant bile duct dilation. Spleen: Unremarkable. Pancreas: No focal parenchymal lesions. No duct dilation. Adrenal glands: Unremarkable. Kidneys: Interval placement of a right double-J nephroureteral stent with the proximal tip curled within right upper renal calices and the distal tip curled within the lumen of the bladder. There is redemonstration of a 1 cm calculus within the dilated right renal pelvis. Additional bilateral nonobstructing nephrolithiasis. Largest calculus previously seen within the right upper pole collecting system no longer identified. In addition, the previously seen 4 mm calculus in the right proximal ureter is no longer identified. . Persistent stable mild right-sided hydroureteronephrosis. Similar degree of right kidney edema and surrounding reactive changes. No left-sided hydronephrosis. Bladder: No acute findings. GI tract: No significant wall thickening or bowel dilation. Status post appendectomy. No acute findings. Peritoneum/mesentery/retroperitoneum: No free air or free fluid. No masses or lymphadenopathy. Pelvis: Stable right adnexal cyst.  No free air or free fluid. No masses or lymphadenopathy. Vasculature: Scattered arterial atherosclerotic disease. No acute findings. Bones/Soft Tissues: No acute fracture or subluxation. No destructive osseous lesions. Soft tissues are unremarkable. 1. Interval placement of a right double-J nephroureteral stent with the proximal tip coiled within the right upper renal calices and the distal tip curled within the lumen of the bladder. 2. Redemonstration of a 1 cm calculus within the dilated right renal pelvis.  Largest calculus within the right upper pole collecting system as well as the 4 mm calculus in the right proximal ureter are no longer identified. 3. Additional bilateral nonobstructing nephrolithiasis. 4. Persistent stable mild right-sided hydroureteronephrosis and similar degree of perinephric inflammatory/reactive changes. 5. Additional findings stable from prior.        Electronically signed by Amber Altamirano PA-C on 7/26/2022 at 11:38 AM

## 2022-07-27 VITALS
BODY MASS INDEX: 28.94 KG/M2 | HEART RATE: 75 BPM | OXYGEN SATURATION: 98 % | RESPIRATION RATE: 17 BRPM | HEIGHT: 69 IN | TEMPERATURE: 98.1 F | WEIGHT: 195.4 LBS | SYSTOLIC BLOOD PRESSURE: 102 MMHG | DIASTOLIC BLOOD PRESSURE: 67 MMHG

## 2022-07-27 LAB
ANION GAP SERPL CALCULATED.3IONS-SCNC: 10 MMOL/L (ref 4–16)
BASOPHILS ABSOLUTE: 0 K/CU MM
BASOPHILS RELATIVE PERCENT: 0.3 % (ref 0–1)
BUN BLDV-MCNC: 12 MG/DL (ref 6–23)
CALCIUM SERPL-MCNC: 8.7 MG/DL (ref 8.3–10.6)
CHLORIDE BLD-SCNC: 102 MMOL/L (ref 99–110)
CO2: 26 MMOL/L (ref 21–32)
CREAT SERPL-MCNC: 0.7 MG/DL (ref 0.6–1.1)
DIFFERENTIAL TYPE: ABNORMAL
EOSINOPHILS ABSOLUTE: 0.1 K/CU MM
EOSINOPHILS RELATIVE PERCENT: 1.7 % (ref 0–3)
GFR AFRICAN AMERICAN: >60 ML/MIN/1.73M2
GFR NON-AFRICAN AMERICAN: >60 ML/MIN/1.73M2
GLUCOSE BLD-MCNC: 161 MG/DL (ref 70–99)
HCT VFR BLD CALC: 34.1 % (ref 37–47)
HEMOGLOBIN: 10.6 GM/DL (ref 12.5–16)
IMMATURE NEUTROPHIL %: 0.5 % (ref 0–0.43)
LYMPHOCYTES ABSOLUTE: 1.1 K/CU MM
LYMPHOCYTES RELATIVE PERCENT: 13.7 % (ref 24–44)
MCH RBC QN AUTO: 24.7 PG (ref 27–31)
MCHC RBC AUTO-ENTMCNC: 31.1 % (ref 32–36)
MCV RBC AUTO: 79.5 FL (ref 78–100)
MONOCYTES ABSOLUTE: 0.6 K/CU MM
MONOCYTES RELATIVE PERCENT: 8.2 % (ref 0–4)
NUCLEATED RBC %: 0 %
PDW BLD-RTO: 15 % (ref 11.7–14.9)
PLATELET # BLD: 184 K/CU MM (ref 140–440)
PMV BLD AUTO: 9.6 FL (ref 7.5–11.1)
POTASSIUM SERPL-SCNC: 3.7 MMOL/L (ref 3.5–5.1)
RBC # BLD: 4.29 M/CU MM (ref 4.2–5.4)
SEGMENTED NEUTROPHILS ABSOLUTE COUNT: 5.8 K/CU MM
SEGMENTED NEUTROPHILS RELATIVE PERCENT: 75.6 % (ref 36–66)
SODIUM BLD-SCNC: 138 MMOL/L (ref 135–145)
TOTAL IMMATURE NEUTOROPHIL: 0.04 K/CU MM
TOTAL NUCLEATED RBC: 0 K/CU MM
WBC # BLD: 7.7 K/CU MM (ref 4–10.5)

## 2022-07-27 PROCEDURE — 80048 BASIC METABOLIC PNL TOTAL CA: CPT

## 2022-07-27 PROCEDURE — G0378 HOSPITAL OBSERVATION PER HR: HCPCS

## 2022-07-27 PROCEDURE — 6370000000 HC RX 637 (ALT 250 FOR IP): Performed by: NURSE PRACTITIONER

## 2022-07-27 PROCEDURE — 94761 N-INVAS EAR/PLS OXIMETRY MLT: CPT

## 2022-07-27 PROCEDURE — 96361 HYDRATE IV INFUSION ADD-ON: CPT

## 2022-07-27 PROCEDURE — 36415 COLL VENOUS BLD VENIPUNCTURE: CPT

## 2022-07-27 PROCEDURE — 6370000000 HC RX 637 (ALT 250 FOR IP): Performed by: STUDENT IN AN ORGANIZED HEALTH CARE EDUCATION/TRAINING PROGRAM

## 2022-07-27 PROCEDURE — 85025 COMPLETE CBC W/AUTO DIFF WBC: CPT

## 2022-07-27 RX ORDER — CIPROFLOXACIN 500 MG/1
500 TABLET, FILM COATED ORAL EVERY 12 HOURS SCHEDULED
Qty: 18 TABLET | Refills: 0 | Status: SHIPPED | OUTPATIENT
Start: 2022-07-27 | End: 2022-08-05

## 2022-07-27 RX ADMIN — IBUPROFEN 800 MG: 800 TABLET, FILM COATED ORAL at 06:19

## 2022-07-27 RX ADMIN — CIPROFLOXACIN HYDROCHLORIDE 500 MG: 500 TABLET, FILM COATED ORAL at 09:49

## 2022-07-27 RX ADMIN — Medication 1 TABLET: at 09:49

## 2022-07-27 ASSESSMENT — PAIN SCALES - WONG BAKER
WONGBAKER_NUMERICALRESPONSE: 0

## 2022-07-27 ASSESSMENT — PAIN SCALES - GENERAL: PAINLEVEL_OUTOF10: 0

## 2022-07-27 NOTE — PROGRESS NOTES
[facility] [address]   Progress Note  [facility] 0 1 2      Date: 2022   Patient: Salvatore Oliveira   : 1985   DOA: 2022   MRN: [id number]   ROOM#: [id number]     Admit Date: 2022     Collaborating Urologist on Call at time of admission: Dr. Tripp Shipman  CC: Abdominal pain and fevers  Reason for Consult: UTI, fever, recent ureteral stent placement    Subjective:     Pain: none, no nausea and no vomiting,   Bowel Movement/Flatus: Yes  Voiding: easily    Pt resting in bed, states she is feeling well and is ready to go home.     Objective:    Vitals:    /67   Pulse 75   Temp 98.1 °F (36.7 °C) (Oral)   Resp 17   Ht 5' 9\" (1.753 m)   Wt 195 lb 6.4 oz (88.6 kg)   LMP 2021   SpO2 98%   BMI 28.86 kg/m²    Temp  Av.8 °F (37.1 °C)  Min: 97.8 °F (36.6 °C)  Max: 101.3 °F (38.5 °C)     Intake/Output Summary (Last 24 hours) at 2022 0859  Last data filed at 2022 1605  Gross per 24 hour   Intake --   Output 400 ml   Net -400 ml     Physical Exam:  General appearance: alert, appears stated age, cooperative, no distress, and mildly obese  Head: Normocephalic, without obvious abnormality, atraumatic  Back: No CVA tenderness  Abdomen:  Soft, non-distended, non-tender    Labs:   WBC:    Lab Results   Component Value Date/Time    WBC 9.9 2022 12:24 PM      Hemoglobin/Hematocrit:    Lab Results   Component Value Date/Time    HGB 11.7 2022 12:24 PM    HCT 39.1 2022 12:24 PM      BMP:   Lab Results   Component Value Date/Time     2022 12:24 PM    K 4.6 2022 12:24 PM     2022 12:24 PM    CO2 24 2022 12:24 PM    BUN 9 2022 12:24 PM    LABALBU 4.1 2022 12:24 PM    CREATININE 0.8 2022 12:24 PM    CALCIUM 8.9 2022 12:24 PM    GFRAA >60 2022 12:24 PM    LABGLOM >60 2022 12:24 PM     Blood Culture: NGTD  Urine Culture: +E.coli >100K CFU/ml, sensitivities pending    Imaging:   CT ABDOMEN PELVIS WO CONTRAST    Result Date: 7/7/2022  EXAMINATION: CT OF THE ABDOMEN AND PELVIS WITHOUT CONTRAST 7/7/2022 7:56 pm TECHNIQUE: CT of the abdomen and pelvis was performed without the administration of intravenous contrast. Multiplanar reformatted images are provided for review. Automated exposure control, iterative reconstruction, and/or weight based adjustment of the mA/kV was utilized to reduce the radiation dose to as low as reasonably achievable. COMPARISON: CT scan dated May 10, 2017 HISTORY: ORDERING SYSTEM PROVIDED HISTORY: flank pain TECHNOLOGIST PROVIDED HISTORY: Reason for exam:->flank pain Is the patient pregnant?->No Reason for Exam: right flank pain FINDINGS: Lower Chest: The lung bases are clear. Organs: The liver, spleen, pancreas, and adrenal glands demonstrate no acute abnormality. The gallbladder is surgically absent. Multiple bilateral intrarenal calculi are present. Largest calculus measures 12 mm, within the upper pole collecting system of the right kidney. 10 mm calculus is present within right renal pelvis. A mild degree of right hydronephrosis and hydroureter is present, secondary to a 4 mm calculus within the proximal ureter. Left perinephric stranding is present related to obstructive uropathy. GI/Bowel: Stomach appears normal.  Small bowel appears normal without evidence of obstruction. Patient is status post prior appendectomy. No focal inflammatory change or wall thickening is present involving the large bowel. Pelvis: Urinary bladder is incompletely distended. Uterus appears grossly normal.  Right ovarian cyst is again noted, measuring 6.5 x 4 cm in AP and transverse dimension, previously measured 7.2 x 6.2 cm. . Peritoneum/Retroperitoneum: Trace amount of free fluid is present within the left pelvis. No free air is present. No aneurysm formation noted. No pathological adenopathy is present. Bones/Soft Tissues: No acute osseous abnormality is present.      1. Mild degree of right hydronephrosis and hydroureter, secondary to a 4 mm calculus within the proximal right ureter. Right perinephric stranding is present, and may be related to obstructive uropathy or infection. Multiple additional right intrarenal calculi are present, largest measuring 12 mm 2. Nonobstructing left intrarenal calculi largest measuring 4 mm 3. 6.5 x 4 cm right adnexal cyst, decreased since the prior study. This was evaluated previously with ultrasound 4. Prior cholecystectomy and appendectomy     CT HEAD WO CONTRAST    Result Date: 7/7/2022  EXAMINATION: CT OF THE HEAD WITHOUT CONTRAST  7/7/2022 10:53 pm TECHNIQUE: CT of the head was performed without the administration of intravenous contrast. Automated exposure control, iterative reconstruction, and/or weight based adjustment of the mA/kV was utilized to reduce the radiation dose to as low as reasonably achievable. COMPARISON: CT head without contrast June 22, 2022. HISTORY: ORDERING SYSTEM PROVIDED HISTORY: head pain TECHNOLOGIST PROVIDED HISTORY: Has a \"code stroke\" or \"stroke alert\" been called? ->No Reason for exam:->head pain Is the patient pregnant?->No Reason for Exam: head pain FINDINGS: BRAIN/VENTRICLES: There is no acute intracranial hemorrhage, mass effect or midline shift. No abnormal extra-axial fluid collection. The gray-white differentiation is maintained without evidence of an acute infarct. There is no evidence of hydrocephalus. ORBITS: The visualized portion of the orbits demonstrate no acute abnormality. SINUSES: The visualized paranasal sinuses and mastoid air cells demonstrate no acute abnormality. SOFT TISSUES/SKULL:  No acute abnormality of the visualized skull or soft tissues. No acute intracranial abnormality.      CT ABDOMEN PELVIS W IV CONTRAST Additional Contrast? None    Result Date: 7/25/2022  EXAMINATION: CT OF THE ABDOMEN AND PELVIS WITH CONTRAST 7/25/2022 5:12 pm TECHNIQUE: CT of the abdomen and pelvis was performed with the administration of intravenous contrast. Multiplanar reformatted images are provided for review. Automated exposure control, iterative reconstruction, and/or weight based adjustment of the mA/kV was utilized to reduce the radiation dose to as low as reasonably achievable. COMPARISON: CT abdomen pelvis from July 7, 2022 HISTORY: ORDERING SYSTEM PROVIDED HISTORY: right flank pain fever TECHNOLOGIST PROVIDED HISTORY: Reason for exam:->right flank pain fever Additional Contrast?->None Decision Support Exception - unselect if not a suspected or confirmed emergency medical condition->Emergency Medical Condition (MA) Reason for Exam: right flank pain fever Relevant Medical/Surgical History: 80 ML ISOVUE 370 FINDINGS: Lower Chest: Visualized lung bases are clear. Liver: Normal morphology. No acute findings. Gallbladder: Cholecystectomy. No significant bile duct dilation. Spleen: Unremarkable. Pancreas: No focal parenchymal lesions. No duct dilation. Adrenal glands: Unremarkable. Kidneys: Interval placement of a right double-J nephroureteral stent with the proximal tip curled within right upper renal calices and the distal tip curled within the lumen of the bladder. There is redemonstration of a 1 cm calculus within the dilated right renal pelvis. Additional bilateral nonobstructing nephrolithiasis. Largest calculus previously seen within the right upper pole collecting system no longer identified. In addition, the previously seen 4 mm calculus in the right proximal ureter is no longer identified. . Persistent stable mild right-sided hydroureteronephrosis. Similar degree of right kidney edema and surrounding reactive changes. No left-sided hydronephrosis. Bladder: No acute findings. GI tract: No significant wall thickening or bowel dilation. Status post appendectomy. No acute findings. Peritoneum/mesentery/retroperitoneum: No free air or free fluid. No masses or lymphadenopathy.  Pelvis: Stable right adnexal cyst.  No free air or free fluid. No masses or lymphadenopathy. Vasculature: Scattered arterial atherosclerotic disease. No acute findings. Bones/Soft Tissues: No acute fracture or subluxation. No destructive osseous lesions. Soft tissues are unremarkable. 1. Interval placement of a right double-J nephroureteral stent with the proximal tip coiled within the right upper renal calices and the distal tip curled within the lumen of the bladder. 2. Redemonstration of a 1 cm calculus within the dilated right renal pelvis. Largest calculus within the right upper pole collecting system as well as the 4 mm calculus in the right proximal ureter are no longer identified. 3. Additional bilateral nonobstructing nephrolithiasis. 4. Persistent stable mild right-sided hydroureteronephrosis and similar degree of perinephric inflammatory/reactive changes. 5. Additional findings stable from prior. FL LESS THAN 1 HOUR    Result Date: 7/8/2022  EXAMINATION: SPOT FLUOROSCOPIC IMAGE 7/8/2022 3:13 pm TECHNIQUE: Fluoroscopy was provided by the radiology department for procedure. Radiologist was not present during examination. FLUOROSCOPY DOSE AND TYPE OR TIME AND EXPOSURES: 5.3 mGy. 0.3 minutes. COMPARISON: CT abdomen and pelvis 07/07/2022. HISTORY: ORDERING SYSTEM PROVIDED HISTORY: cysto TECHNOLOGIST PROVIDED HISTORY: Reason for exam:->cysto Reason for Exam: rt cysto Intraprocedural imaging. FINDINGS: A single spot image of the right urinary collecting system was obtained. The image demonstrates partial contrast opacification of the mid to distal ureter. No definite filling defect or extravasation. Intraprocedural fluoroscopic spot images as above. See separate procedure report for more information. Assessment & Plan:      Esther Benedict is a 43 y.o. female admitted 7/25/2022 for UTI, fevers.      1) Right Renal Calculi w Mild Hydronephrosis: S/p cystoscopy, right retrograde pyelogram, right ureteral stent placement 7/8/22. CT a/p 7/25/22: Interval placement of a right double-J nephroureteral stent with the proximal tip coiled within the right upper renal calices and the distal tip curled within the lumen of the bladder. Redemonstration of a 1 cm calculus within the dilated right renal pelvis. Largest calculus within the right upper pole collecting system as well as the 4 mm calculus in the right proximal ureter are no longer identified. Additional bilateral nonobstructing nephrolithiasis. Persistent stable mild right-sided hydroureteronephrosis and similar degree of perinephric inflammatory/reactive changes. Cr 0.8              No plan for cystoscopy, right ureteral stent exchange at this time. We will proceed with a right ureteroscopy/laser lithotripsy once her urine is clear of infection, hopefully next week. 2) Febrile UTI/Pyelonephritis: Urine cx +E.coli >100K CFU/ml, sensitivities pending              WBC 9.9 7/25, T-max of 101.3 last night. Labs pending for today. On po Cipro 500mg BID; recommend discharge on appropriate oral abx x10 days. Pt instructed to return to the ED if she develops fevers/chills. Pt stable from a  standpoint. Will sign off, please call with any questions. Pt to follow up in our office Monday, 8/1/22, to drop off a urine specimen for culture. Will try to arrange right ureteroscopy next week. Patient seen and examined, chart reviewed.      Electronically signed by Juan M Bobby PA-C on 7/27/2022 at 8:59 AM

## 2022-07-27 NOTE — PROGRESS NOTES
Pt requested to have IV line removed and medications changed to oral. This was discussed with DR. Farah. Pt is eating and drinking adequately.

## 2022-07-27 NOTE — PROGRESS NOTES
Reviewed discharge instructions with patient including medications and follow up appointments. Education material given to patient. Pt verbalized understanding and had no questions. Meds to Bed for Cipro brought up from pharmacy. Pt did not have IV to remove. Pt left with all belongings. Mother here to  patient to transport home.

## 2022-07-27 NOTE — DISCHARGE SUMMARY
V2.0  Discharge Summary    Name:  Rakan Petit /Age/Sex: 1985 (43 y.o. female)   Admit Date: 2022  Discharge Date: 22    MRN & CSN:  [id number] & [id number] Encounter Date and Time 22 10:10 AM EDT    Attending:  Harish Pfeiffer MD Discharging [facility]:     Brief HPI: Rakan Petit is a 43 y.o. female with past medical history of  kidney stones who presents with UTI (urinary tract infection)     Problems addressed during this hospitalization:     R pyelonephritis, UTI  Sepsis - resolved  Right ureteral calculus  - sepsis evidenced by T max 102.5, tachycardia; HDS, LA WNL, no leukocytosis  -CT abdomen pelvis with interval placement of right double-J nephroureteral stent with proximal tip coiled within the right upper renal calculi stent distal tip curled within the lumen of the bladder. Redemonstration of 1 cm calculus noted as well as 4 mm calculus in the right proximal ureter  - BCx NGTD  - UCx 22 with pansensitive E coli, repeat UCx with E coli, sensitivities pending on discharge  -On day of discharge, patient afebrile, well-appearing  - initially started on Rocephin, discharged on oral cipro x 10 day course per urology recs   - urology followed, no plans for cystoscopy or ureteral stent exchange at this time, planning to proceed with right ureteroscopy/laser lithotripsy once urine is clear infection, follow-up next week as outpatient   - continue PRN NSAIDs for pain      Migraines  - continue PRN NSAIDs    Anemia   - mild, slightly downtrended 11.7-->10.6 , likely hemodilutional   - reports intermittent vaginal bleeding since giving birth in   -Has outpatient OB/GYN follow-up scheduled next week  -repeat CBC in 1 week    This patient was discussed with Dr. Nuria Hassan. He was agreeable with patient's plan at discharge as dictated above.      The patient expressed appropriate understanding of, and agreement with the discharge recommendations, medications, and plan.      Consults this admission:  IP CONSULT TO UROLOGY  IP CONSULT TO HOSPITALIST  IP CONSULT TO UROLOGY    Discharge Diagnosis:   UTI (urinary tract infection)        Discharge Instruction:   Follow up appointments: urology, PCP   Primary care physician: TERI Huff within 2 weeks  Diet: regular diet   Activity: activity as tolerated  Disposition: Discharged to:   [x]Home, [][facility], []SNF, []Acute Rehab, []Hospice   Condition on discharge: Stable  Labs and Tests to be Followed up as an outpatient by PCP or Specialist: CBC in 1 week    Discharge Medications:        Medication List        START taking these medications      ciprofloxacin 500 MG tablet  Commonly known as: CIPRO  Take 1 tablet by mouth every 12 hours for 9 days            CONTINUE taking these medications      acetaminophen 500 MG tablet  Commonly known as: TYLENOL     docusate sodium 100 MG capsule  Commonly known as: COLACE  Take 1 capsule by mouth 2 times daily     glucose monitoring kit  1 kit by Does not apply route daily     ibuprofen 800 MG tablet  Commonly known as: ADVIL;MOTRIN  Take 1 tablet by mouth every 8 hours     Lancets Misc  1 each by Does not apply route 4 times daily     ondansetron 4 MG disintegrating tablet  Commonly known as: ZOFRAN-ODT  Take 1 tablet by mouth every 8 hours as needed for Nausea or Vomiting     Prenatal Gummies/DHA & FA 0.4-32.5 MG Chew  Take 1 tablet by mouth daily            STOP taking these medications      blood glucose test strips               Where to Get Your Medications        These medications were sent to [address] [place] [address] 25, [address]      Phone: [phone]   ciprofloxacin 500 MG tablet        Objective Findings at Discharge:   /67   Pulse 75   Temp 98.1 °F (36.7 °C) (Oral)   Resp 17   Ht 5' 9\" (1.753 m) Wt 195 lb 6.4 oz (88.6 kg)   LMP 09/14/2021   SpO2 98%   BMI 28.86 kg/m²       Physical Exam:   General: NAD  Eyes: EOMI  ENT: neck supple  Cardiovascular: Regular rate. Respiratory: Clear to auscultation bilaterally, respirations even and unlabored on RA   Gastrointestinal: Abdomen soft, non tender  Genitourinary: no suprapubic tenderness, no CVA tenderness  Musculoskeletal: No edema  Skin: warm, dry  Neuro: Alert. Psych: Mood appropriate. Labs and Imaging   CT ABDOMEN PELVIS W IV CONTRAST Additional Contrast? None    Result Date: 7/25/2022  EXAMINATION: CT OF THE ABDOMEN AND PELVIS WITH CONTRAST 7/25/2022 5:12 pm TECHNIQUE: CT of the abdomen and pelvis was performed with the administration of intravenous contrast. Multiplanar reformatted images are provided for review. Automated exposure control, iterative reconstruction, and/or weight based adjustment of the mA/kV was utilized to reduce the radiation dose to as low as reasonably achievable. COMPARISON: CT abdomen pelvis from July 7, 2022 HISTORY: ORDERING SYSTEM PROVIDED HISTORY: right flank pain fever TECHNOLOGIST PROVIDED HISTORY: Reason for exam:->right flank pain fever Additional Contrast?->None Decision Support Exception - unselect if not a suspected or confirmed emergency medical condition->Emergency Medical Condition (MA) Reason for Exam: right flank pain fever Relevant Medical/Surgical History: 80 ML ISOVUE 370 FINDINGS: Lower Chest: Visualized lung bases are clear. Liver: Normal morphology. No acute findings. Gallbladder: Cholecystectomy. No significant bile duct dilation. Spleen: Unremarkable. Pancreas: No focal parenchymal lesions. No duct dilation. Adrenal glands: Unremarkable. Kidneys: Interval placement of a right double-J nephroureteral stent with the proximal tip curled within right upper renal calices and the distal tip curled within the lumen of the bladder.   There is redemonstration of a 1 cm calculus within the dilated right renal pelvis. Additional bilateral nonobstructing nephrolithiasis. Largest calculus previously seen within the right upper pole collecting system no longer identified. In addition, the previously seen 4 mm calculus in the right proximal ureter is no longer identified. . Persistent stable mild right-sided hydroureteronephrosis. Similar degree of right kidney edema and surrounding reactive changes. No left-sided hydronephrosis. Bladder: No acute findings. GI tract: No significant wall thickening or bowel dilation. Status post appendectomy. No acute findings. Peritoneum/mesentery/retroperitoneum: No free air or free fluid. No masses or lymphadenopathy. Pelvis: Stable right adnexal cyst.  No free air or free fluid. No masses or lymphadenopathy. Vasculature: Scattered arterial atherosclerotic disease. No acute findings. Bones/Soft Tissues: No acute fracture or subluxation. No destructive osseous lesions. Soft tissues are unremarkable. 1. Interval placement of a right double-J nephroureteral stent with the proximal tip coiled within the right upper renal calices and the distal tip curled within the lumen of the bladder. 2. Redemonstration of a 1 cm calculus within the dilated right renal pelvis. Largest calculus within the right upper pole collecting system as well as the 4 mm calculus in the right proximal ureter are no longer identified. 3. Additional bilateral nonobstructing nephrolithiasis. 4. Persistent stable mild right-sided hydroureteronephrosis and similar degree of perinephric inflammatory/reactive changes. 5. Additional findings stable from prior.        CBC:   Recent Labs     07/25/22  1224 07/27/22  0920   WBC 9.9 7.7   HGB 11.7* 10.6*    184     BMP:    Recent Labs     07/25/22  1224 07/27/22  0920    138   K 4.6 3.7    102   CO2 24 26   BUN 9 12   CREATININE 0.8 0.7   GLUCOSE 108* 161*     Hepatic:   Recent Labs     07/25/22  1224   AST 16   ALT 17   BILITOT 0.6   ALKPHOS 82 Lipids: No results found for: CHOL, HDL, TRIG  Hemoglobin A1C:   Lab Results   Component Value Date/Time    LABA1C 5.3 05/10/2022 11:20 AM     TSH: No results found for: TSH  Troponin: No results found for: TROPONINT  Lactic Acid: No results for input(s): LACTA in the last 72 hours. BNP: No results for input(s): PROBNP in the last 72 hours.   UA:  Lab Results   Component Value Date/Time    NITRU POSITIVE 07/25/2022 12:24 PM    COLORU YELLOW 07/25/2022 12:24 PM    WBCUA 51 07/25/2022 12:24 PM    RBCUA 247 07/25/2022 12:24 PM    MUCUS RARE 07/25/2022 12:24 PM    TRICHOMONAS NONE SEEN 07/25/2022 12:24 PM    YEAST RARE 04/29/2018 12:04 PM    BACTERIA RARE 07/25/2022 12:24 PM    CLARITYU CLEAR 07/25/2022 12:24 PM    SPECGRAV 1.015 07/25/2022 12:24 PM    LEUKOCYTESUR SMALL 07/25/2022 12:24 PM    UROBILINOGEN 0.2 07/25/2022 12:24 PM    BILIRUBINUR NEGATIVE 07/25/2022 12:24 PM    BLOODU LARGE 07/25/2022 12:24 PM    KETUA NEGATIVE 07/25/2022 12:24 PM     Urine Cultures:   Lab Results   Component Value Date/Time    LABURIN >100,000 CFU/ml 01/13/2022 10:53 AM     Blood Cultures: No results found for: BC  No results found for: BLOODCULT2  Organism:   Lab Results   Component Value Date/Time    ORG BHS Group B (Strep agalacticae) 05/24/2022 11:18 AM       Time Spent Discharging patient 35 minutes    Electronically signed by Nic Boyle PA-C on 7/27/2022 at 10:10 AM

## 2022-07-27 NOTE — PLAN OF CARE
Problem: Pain  Goal: Verbalizes/displays adequate comfort level or baseline comfort level  Outcome: Resolved/Not Met

## 2022-07-27 NOTE — PROGRESS NOTES
Outpatient Pharmacy Progress Note for Meds-to-Beds    Total number of Prescriptions Filled: 1  The following medications were dispensed to the patient during the discharge process:  Ciprofloxacin    Additional Documentation:  Medication(s) were delivered to the patient's room prior to discharge      Thank you for letting us serve your patients.   [address]    [address], [address] [facility]    Phone: [phone]    Fax: [phone]

## 2022-07-27 NOTE — PLAN OF CARE
Discharge instructions and teaching completed with pt. Pt discharging home. Pt verbalized understanding, no needs.

## 2022-07-27 NOTE — DISCHARGE INSTRUCTIONS
Please see your PCP in 1-2 weeks to have your labwork repeated. Your hemoglobin is mildly low (anemia).

## 2022-07-27 NOTE — PLAN OF CARE
Problem: Discharge Planning  Goal: Discharge to home or other facility with appropriate resources  7/26/2022 2024 by Princess Ferreira RN  Outcome: Progressing Towards Goal  7/26/2022 2023 by Princess Ferreira RN  Outcome: Progressing Towards Goal     Problem: Pain  Goal: Verbalizes/displays adequate comfort level or baseline comfort level  7/26/2022 2024 by Princess Ferreira RN  Outcome: Progressing Towards Goal  7/26/2022 2023 by Princess Ferreira RN  Outcome: Progressing Towards Goal

## 2022-07-28 LAB
CULTURE: ABNORMAL
CULTURE: ABNORMAL
Lab: ABNORMAL
SPECIMEN: ABNORMAL

## 2022-07-30 LAB
CULTURE: NORMAL
CULTURE: NORMAL
Lab: NORMAL
Lab: NORMAL
SPECIMEN: NORMAL
SPECIMEN: NORMAL

## 2022-08-02 NOTE — PROGRESS NOTES
8/2/22 - . LM concerning  surgery @ [facility] on  8/8/22. Please call the PAT Nurse for a phone assessment and surgery instructions.

## 2022-08-02 NOTE — PROGRESS NOTES
.Surgery @ [facility] on 8/8/22 you will be called 8/5/22 with times               1. Do not eat or drink anything after midnight - unless instructed by your doctor prior to surgery. This includes                   no water, chewing gum or mints. 2. Follow your directions as prescribed by the doctor for your procedure and medications. 3. Check with your Doctor regarding stopping vitamins, supplements, blood thinners (Plavix, Coumadin, Lovenox, Effient, Pradaxa, Xarelto, Fragmin or                   other blood thinners) and follow their instructions. No vitamins, supplements   4. Do not smoke, vape or use chewing tobacco morning of surgery. Do not drink any alcoholic beverages 24 hours prior to surgery. This includes NA Beer. No street drugs 7 days prior to surgery. 5. You may brush your teeth and gargle the morning of surgery. DO NOT SWALLOW WATER   6. You MUST make arrangements for a responsible adult to take you home after your surgery and be able to check on you every couple                   hours for the day. You will not be allowed to leave alone or drive yourself home. It is strongly suggested someone stay with you the first 24                   hrs. Your surgery will be cancelled if you do not have a ride home. 7. Please wear simple, loose fitting clothing to the hospital.  Hyla Dense not bring valuables (money, credit cards, checkbooks, etc.) Do not wear any                   makeup (including no eye makeup) or nail polish on your fingers or toes. 8. DO NOT wear any jewelry or piercings on day of surgery. All body piercing jewelry must be removed. 9. If you have dentures, they will be removed before going to the OR; we will provide you a container. If you wear contact lenses or glasses,                  they will be removed; please bring a case for them.            10. If you  have a Living Will and Durable Power of  for Healthcare, please bring in a copy.           11. Please bring picture ID,  insurance card, paperwork from the doctors office    (H & P, Consent, & card for implantable devices). 12. Take a shower the morning of your procedure. Do not apply any make-up, deodorant, lotion, oil or powder. 13.  Enter thru the main entrance wearing a mask on the day of surgery.

## 2022-08-05 ENCOUNTER — ANESTHESIA EVENT (OUTPATIENT)
Dept: OPERATING ROOM | Age: 37
End: 2022-08-05
Payer: MEDICARE

## 2022-08-05 NOTE — ANESTHESIA PRE PROCEDURE
Department of Anesthesiology  Preprocedure Note       Name:  Renu Gotti   Age:  43 y.o.  :  1985                                          MRN:  [id number]         Date:  2022      Surgeon: Kelly Thomas):  Aldo Linder MD    Procedure: Procedure(s):  RIGHT CYSTOSCOPY URETEROSCOPY RETROGRADE PYELOGRAM STONE MANIPULATION WITH HOLIUM LASER LITHOTRIPSY POSSIBLE STENT PLACEMENT    Medications prior to admission:   Prior to Admission medications    Medication Sig Start Date End Date Taking? Authorizing Provider   ciprofloxacin (CIPRO) 500 MG tablet Take 1 tablet by mouth every 12 hours for 9 days 22  Fritz Hanson PA-C   acetaminophen (TYLENOL) 500 MG tablet Take 1,000 mg by mouth every 6 hours as needed for Pain    Historical Provider, MD   ibuprofen (ADVIL;MOTRIN) 800 MG tablet Take 1 tablet by mouth every 8 hours 22   Radha Pale, APRN - CNM   blood glucose monitor strips Test 4 times a day & as needed for symptoms of irregular blood glucose. Dispense sufficient amount for indicated testing frequency plus additional to accommodate PRN testing needs. Patient not taking: Reported on 2022 4/15/22 7/27/22  Cliff Woodard MD       Current medications:    Current Outpatient Medications   Medication Sig Dispense Refill    ciprofloxacin (CIPRO) 500 MG tablet Take 1 tablet by mouth every 12 hours for 9 days 18 tablet 0    acetaminophen (TYLENOL) 500 MG tablet Take 1,000 mg by mouth every 6 hours as needed for Pain      ibuprofen (ADVIL;MOTRIN) 800 MG tablet Take 1 tablet by mouth every 8 hours 120 tablet 3     No current facility-administered medications for this visit. Allergies:  No Known Allergies    Problem List:    Patient Active Problem List   Diagnosis Code    Acute appendicitis K35.80    Dehiscence of surgical wound T81. 31XA    Chronic hypertension complicating or reason for care during pregnancy, second trimester O10.912     death in prior pregnancy, currently pregnant, second trimester O09.292    Labor and delivery indication for care or intervention O75.9    Obesity affecting pregnancy in third trimester, antepartum O99.213    History of stillbirth in currently pregnant patient, third trimester O0.36    GDM, class A1 O24.410    Gestational diabetes mellitus (GDM), delivered O22.26    Elderly multigravida delivered O12.46    Single liveborn, born in hospital, delivered by vaginal delivery Z38.00    44 weeks gestation of pregnancy Z3A.39    Encounter for elective induction of labor Z34.90    Preeclampsia in postpartum period O14.95    Pre-eclampsia in third trimester O14.93    Acute pyelonephritis N10    Nephrolithiasis N20.0    UTI (urinary tract infection) N39.0       Past Medical History:        Diagnosis Date    Acute appendicitis 2012    Chemical pregnancy     Irregular menses     Kidney infection     Kidney stones 2022    Obesity     Prior pregnancy with fetal demise and current pregnancy 2007    36Wks       Past Surgical History:        Procedure Laterality Date    BLADDER SURGERY Right 2022    CYSTOSCOPY RIGHT  RETROGRADE PYELOGRAM STENT INSERTION performed by Michael Hughes MD at [address] [place] [address]  04/10/2012    OTHER SURGICAL HISTORY  2010    lump removed from right axilla    OTHER SURGICAL HISTORY  2010    lump removed from groin area    OTHER SURGICAL HISTORY Right 2013    Excision of lesion to R axilla    OTHER SURGICAL HISTORY Right 2013    Closure right axillary wound       Social History:    Social History     Tobacco Use    Smoking status: Former     Packs/day: 1.00     Years: 15.00     Pack years: 15.00     Types: Cigarettes     Quit date: 2017     Years since quittin.6    Smokeless tobacco: Never    Tobacco comments:     quit when she found out she was pregnant   Substance Use Topics    Alcohol use:  No Counseling given: Not Answered  Tobacco comments: quit when she found out she was pregnant      Vital Signs (Current): There were no vitals filed for this visit. BP Readings from Last 3 Encounters:   07/27/22 102/67   07/12/22 136/88   07/10/22 130/77       NPO Status:                                                                                 BMI:   Wt Readings from Last 3 Encounters:   07/27/22 195 lb 6.4 oz (88.6 kg)   07/12/22 204 lb (92.5 kg)   07/10/22 210 lb (95.3 kg)     There is no height or weight on file to calculate BMI.    CBC:   Lab Results   Component Value Date/Time    WBC 7.7 07/27/2022 09:20 AM    RBC 4.29 07/27/2022 09:20 AM    HGB 10.6 07/27/2022 09:20 AM    HCT 34.1 07/27/2022 09:20 AM    MCV 79.5 07/27/2022 09:20 AM    RDW 15.0 07/27/2022 09:20 AM     07/27/2022 09:20 AM       CMP:   Lab Results   Component Value Date/Time     07/27/2022 09:20 AM    K 3.7 07/27/2022 09:20 AM     07/27/2022 09:20 AM    CO2 26 07/27/2022 09:20 AM    BUN 12 07/27/2022 09:20 AM    CREATININE 0.7 07/27/2022 09:20 AM    GFRAA >60 07/27/2022 09:20 AM    LABGLOM >60 07/27/2022 09:20 AM    GLUCOSE 161 07/27/2022 09:20 AM    PROT 7.1 07/25/2022 12:24 PM    CALCIUM 8.7 07/27/2022 09:20 AM    BILITOT 0.6 07/25/2022 12:24 PM    ALKPHOS 82 07/25/2022 12:24 PM    AST 16 07/25/2022 12:24 PM    ALT 17 07/25/2022 12:24 PM       POC Tests: No results for input(s): POCGLU, POCNA, POCK, POCCL, POCBUN, POCHEMO, POCHCT in the last 72 hours.     Coags: No results found for: PROTIME, INR, APTT    HCG (If Applicable):   Lab Results   Component Value Date    PREGTESTUR NEGATIVE 07/25/2022        ABGs: No results found for: PHART, PO2ART, FYR4CJB, BHI7QML, BEART, K8JAVNTO     Type & Screen (If Applicable):  No results found for: LABABO, LABRH    Drug/Infectious Status (If Applicable):  No results found for: HIV, HEPCAB    COVID-19 Screening (If Applicable): No results found for: COVID19        Anesthesia Evaluation  Patient summary reviewed no history of anesthetic complications:   Airway: Mallampati: II  TM distance: >3 FB   Neck ROM: full  Mouth opening: > = 3 FB   Dental: normal exam         Pulmonary:normal exam                               Cardiovascular:  Exercise tolerance: good (>4 METS),   (+) hypertension:,       ECG reviewed                     ROS comment: EKG on 06/22/22  Normal sinus rhythm   Normal ECG   No previous ECGs available        Neuro/Psych:               GI/Hepatic/Renal:   (+) renal disease: kidney stones,           Endo/Other:    (+) DiabetesType I DM, , .                 Abdominal:             Vascular: Other Findings:             Anesthesia Plan      general     ASA 2       Induction: intravenous. MIPS: Prophylactic antiemetics administered. Anesthetic plan and risks discussed with patient. Plan discussed with CRNA.     Attending anesthesiologist reviewed and agrees with Preprocedure content                CHELSEA Pabon - CRNA   8/5/2022

## 2022-08-05 NOTE — PROGRESS NOTES
8/5/22 - Notified patient surgery @ [facility] on 8/8/22 @ 1500, arrival 1300. Understanding verbalized.

## 2022-08-08 ENCOUNTER — APPOINTMENT (OUTPATIENT)
Dept: GENERAL RADIOLOGY | Age: 37
End: 2022-08-08
Attending: UROLOGY
Payer: MEDICARE

## 2022-08-08 ENCOUNTER — HOSPITAL ENCOUNTER (OUTPATIENT)
Age: 37
Setting detail: OUTPATIENT SURGERY
Discharge: HOME | End: 2022-08-08
Attending: UROLOGY | Admitting: UROLOGY
Payer: MEDICARE

## 2022-08-08 ENCOUNTER — ANESTHESIA (OUTPATIENT)
Dept: OPERATING ROOM | Age: 37
End: 2022-08-08
Payer: MEDICARE

## 2022-08-08 VITALS
HEART RATE: 70 BPM | BODY MASS INDEX: 28.73 KG/M2 | RESPIRATION RATE: 18 BRPM | DIASTOLIC BLOOD PRESSURE: 90 MMHG | HEIGHT: 69 IN | WEIGHT: 194 LBS | SYSTOLIC BLOOD PRESSURE: 150 MMHG | TEMPERATURE: 97.3 F | OXYGEN SATURATION: 96 %

## 2022-08-08 DIAGNOSIS — N20.1 URETERAL STONE: ICD-10-CM

## 2022-08-08 LAB — PREGNANCY TEST URINE, POC: NEGATIVE

## 2022-08-08 PROCEDURE — 81025 URINE PREGNANCY TEST: CPT

## 2022-08-08 PROCEDURE — 6360000002 HC RX W HCPCS

## 2022-08-08 PROCEDURE — 3600000013 HC SURGERY LEVEL 3 ADDTL 15MIN: Performed by: UROLOGY

## 2022-08-08 PROCEDURE — 6360000002 HC RX W HCPCS: Performed by: UROLOGY

## 2022-08-08 PROCEDURE — 2720000010 HC SURG SUPPLY STERILE: Performed by: UROLOGY

## 2022-08-08 PROCEDURE — 7100000001 HC PACU RECOVERY - ADDTL 15 MIN: Performed by: UROLOGY

## 2022-08-08 PROCEDURE — 3700000001 HC ADD 15 MINUTES (ANESTHESIA): Performed by: UROLOGY

## 2022-08-08 PROCEDURE — C1894 INTRO/SHEATH, NON-LASER: HCPCS | Performed by: UROLOGY

## 2022-08-08 PROCEDURE — C1758 CATHETER, URETERAL: HCPCS | Performed by: UROLOGY

## 2022-08-08 PROCEDURE — 82360 CALCULUS ASSAY QUANT: CPT

## 2022-08-08 PROCEDURE — 7100000000 HC PACU RECOVERY - FIRST 15 MIN: Performed by: UROLOGY

## 2022-08-08 PROCEDURE — 2709999900 HC NON-CHARGEABLE SUPPLY: Performed by: UROLOGY

## 2022-08-08 PROCEDURE — 7100000011 HC PHASE II RECOVERY - ADDTL 15 MIN: Performed by: UROLOGY

## 2022-08-08 PROCEDURE — C2617 STENT, NON-COR, TEM W/O DEL: HCPCS | Performed by: UROLOGY

## 2022-08-08 PROCEDURE — 7100000010 HC PHASE II RECOVERY - FIRST 15 MIN: Performed by: UROLOGY

## 2022-08-08 PROCEDURE — 3700000000 HC ANESTHESIA ATTENDED CARE: Performed by: UROLOGY

## 2022-08-08 PROCEDURE — 3600000003 HC SURGERY LEVEL 3 BASE: Performed by: UROLOGY

## 2022-08-08 PROCEDURE — C1769 GUIDE WIRE: HCPCS | Performed by: UROLOGY

## 2022-08-08 PROCEDURE — 2580000003 HC RX 258: Performed by: UROLOGY

## 2022-08-08 PROCEDURE — 76000 FLUOROSCOPY <1 HR PHYS/QHP: CPT

## 2022-08-08 DEVICE — VARIABLE LENGTH URETERAL STENT
Type: IMPLANTABLE DEVICE | Site: URETER | Status: FUNCTIONAL
Brand: CONTOUR VL™

## 2022-08-08 RX ORDER — SODIUM CHLORIDE, SODIUM LACTATE, POTASSIUM CHLORIDE, CALCIUM CHLORIDE 600; 310; 30; 20 MG/100ML; MG/100ML; MG/100ML; MG/100ML
INJECTION, SOLUTION INTRAVENOUS CONTINUOUS
Status: DISCONTINUED | OUTPATIENT
Start: 2022-08-08 | End: 2022-08-08 | Stop reason: HOSPADM

## 2022-08-08 RX ORDER — ONDANSETRON 4 MG/1
4 TABLET, FILM COATED ORAL EVERY 12 HOURS PRN
Qty: 8 TABLET | Refills: 0 | Status: SHIPPED | OUTPATIENT
Start: 2022-08-08

## 2022-08-08 RX ORDER — LIDOCAINE HYDROCHLORIDE 20 MG/ML
INJECTION, SOLUTION INTRAVENOUS PRN
Status: DISCONTINUED | OUTPATIENT
Start: 2022-08-08 | End: 2022-08-08 | Stop reason: SDUPTHER

## 2022-08-08 RX ORDER — ONDANSETRON 2 MG/ML
INJECTION INTRAMUSCULAR; INTRAVENOUS PRN
Status: DISCONTINUED | OUTPATIENT
Start: 2022-08-08 | End: 2022-08-08 | Stop reason: SDUPTHER

## 2022-08-08 RX ORDER — DEXAMETHASONE SODIUM PHOSPHATE 4 MG/ML
INJECTION, SOLUTION INTRA-ARTICULAR; INTRALESIONAL; INTRAMUSCULAR; INTRAVENOUS; SOFT TISSUE PRN
Status: DISCONTINUED | OUTPATIENT
Start: 2022-08-08 | End: 2022-08-08 | Stop reason: SDUPTHER

## 2022-08-08 RX ORDER — CEFUROXIME AXETIL 500 MG/1
500 TABLET ORAL 2 TIMES DAILY
Qty: 8 TABLET | Refills: 0 | Status: SHIPPED | OUTPATIENT
Start: 2022-08-08 | End: 2022-08-12

## 2022-08-08 RX ORDER — HYDROCODONE BITARTRATE AND ACETAMINOPHEN 5; 325 MG/1; MG/1
1 TABLET ORAL EVERY 6 HOURS PRN
Qty: 8 TABLET | Refills: 0 | Status: SHIPPED | OUTPATIENT
Start: 2022-08-08 | End: 2022-08-11

## 2022-08-08 RX ORDER — PROPOFOL 10 MG/ML
INJECTION, EMULSION INTRAVENOUS PRN
Status: DISCONTINUED | OUTPATIENT
Start: 2022-08-08 | End: 2022-08-08 | Stop reason: SDUPTHER

## 2022-08-08 RX ORDER — FENTANYL CITRATE 50 UG/ML
INJECTION, SOLUTION INTRAMUSCULAR; INTRAVENOUS PRN
Status: DISCONTINUED | OUTPATIENT
Start: 2022-08-08 | End: 2022-08-08 | Stop reason: SDUPTHER

## 2022-08-08 RX ADMIN — CEFAZOLIN 2000 MG: 2 INJECTION, POWDER, FOR SOLUTION INTRAMUSCULAR; INTRAVENOUS at 14:49

## 2022-08-08 RX ADMIN — CEFAZOLIN 2000 MG: 2 INJECTION, POWDER, FOR SOLUTION INTRAMUSCULAR; INTRAVENOUS at 14:55

## 2022-08-08 RX ADMIN — ONDANSETRON 4 MG: 2 INJECTION INTRAMUSCULAR; INTRAVENOUS at 15:01

## 2022-08-08 RX ADMIN — PROPOFOL 140 MG: 10 INJECTION, EMULSION INTRAVENOUS at 14:57

## 2022-08-08 RX ADMIN — SODIUM CHLORIDE, POTASSIUM CHLORIDE, SODIUM LACTATE AND CALCIUM CHLORIDE: 600; 310; 30; 20 INJECTION, SOLUTION INTRAVENOUS at 14:00

## 2022-08-08 RX ADMIN — LIDOCAINE HYDROCHLORIDE 100 MG: 20 INJECTION, SOLUTION INTRAVENOUS at 14:57

## 2022-08-08 RX ADMIN — DEXAMETHASONE SODIUM PHOSPHATE 4 MG: 4 INJECTION, SOLUTION INTRAMUSCULAR; INTRAVENOUS at 15:01

## 2022-08-08 RX ADMIN — FENTANYL CITRATE 100 MCG: 50 INJECTION, SOLUTION INTRAMUSCULAR; INTRAVENOUS at 14:57

## 2022-08-08 ASSESSMENT — PAIN SCALES - GENERAL
PAINLEVEL_OUTOF10: 0
PAINLEVEL_OUTOF10: 0

## 2022-08-08 ASSESSMENT — PAIN - FUNCTIONAL ASSESSMENT: PAIN_FUNCTIONAL_ASSESSMENT: 0-10

## 2022-08-08 NOTE — ANESTHESIA POSTPROCEDURE EVALUATION
Department of Anesthesiology  Postprocedure Note    Patient: Salvatore Oliveira  MRN: [id number]  YOB: 1985  Date of evaluation: 8/8/2022      Procedure Summary     Date: 08/08/22 Room / Location: [address] [place] [address] OR [address] [place] [address]    Anesthesia Start: 1452 Anesthesia Stop: 1543    Procedure: RIGHT CYSTOSCOPY URETEROSCOPY RETROGRADE [address] LITHOTRIPSY POSSIBLE [address],[facility] [address] (Right) Diagnosis:       Ureteral stone      (Ureteral stone [N20.1])    Surgeons: Giuliano Bertrand MD Responsible Provider: Brodie Webb MD    Anesthesia Type: general ASA Status: 2          Anesthesia Type: No value filed.     Veronica Phase I: Veronica Score: 7    Veronica Phase II:        Anesthesia Post Evaluation    Patient location during evaluation: PACU  Patient participation: complete - patient participated  Level of consciousness: awake and awake and alert  Pain score: 0  Airway patency: patent  Nausea & Vomiting: no nausea and no vomiting  Complications: no  Cardiovascular status: blood pressure returned to baseline and hemodynamically stable  Respiratory status: acceptable, spontaneous ventilation and face mask  Hydration status: euvolemic

## 2022-08-08 NOTE — BRIEF OP NOTE
Brief Postoperative Note      Patient: Jacqueline Way  YOB: 1985  MRN: [id number]    Date of Procedure: 8/8/2022    Pre-Op Diagnosis: right Ureteral stone [N20.1]                                  Right renal stone    Post-Op Diagnosis:  right renal stones x 3         Procedure   Right ureteroscopy, holmium laser lithotripsy, and basket stone extraction  Cystoscopy and right ureter stent exchange    Surgeon(s):  Olvin aMgallanes MD    Anesthesia: General    Estimated Blood Loss (mL): Minimal    Complications: None    Specimens:   ID Type Source Tests Collected by Time Destination   1 : RIGHT URETERAL STONE Stone (Calculus) Tissue STONE 138 Emmett Emery Sawyer MD 8/8/2022 1516        Implants:  Implant Name Type Inv. Item Serial No.  Lot No. LRB No. Used Action   STENT URET 4.8FR [id number] PERCFLX HYDR+ SFT PGTL TAPR TIP - [id number]  STENT URET 4.8FR [id number] PERCFLX HYDR+ SFT PGTL TAPR TIP  [employer] [facility] UROLOGY- [id number] Right 1 Implanted   With black string      Findings:  No stones found in right ureter  Three stones in right kidney- flat (1-2mm thick but 1.1cm wide).   Soft stones fragmented quickly into small piecies  No concerning masses or lesions in bladder  Several small randalls plaques     Electronically signed by Olvin Magallanes MD on 8/8/2022 at 3:27 PM

## 2022-08-08 NOTE — PROGRESS NOTES
1607-Patient arrived back to [facility]. Report given to this nurse from [facility] PACU. Patient A&O able to make needs known. No C/O pain, Patient up to side of bed and walked to restroom tolerated well. Beverage of choice offered to patient. Call light in reach and bed in lowest position. 1627-IV removed,discharge instructions given to patient and her spouse Min Hutson both verbalized understanding. Patient sitting on side of bed getting dressed assisted by Min Hutson. 1640-Patient escorted to car via wheelchair transported home by Min Hutson.

## 2022-08-08 NOTE — H&P
H&P dated 7/22/2022 reviewed    Hx 4mm right ureter stone and right renal stones  Doing well today  Tolerating stent  Hx recurrent pan sensitive E. Coli . Prior to this event had rare UTI    Delivery 6 weeks ago.   Bottle feeding now    Urine culture 8/2/2022 negative    A, ox3, nad  Af, vss  Sof nd/nt  Obese    CT Scan a/p 7/7/2022 reviewed  She has a right ureter stent    Discussed risks, benefits, and alternative therapies  She elected to proceed with right ureteroscopy and stone manipulation of right ureter and right renal stones with possible right ureter stent exchange  We discussed stent replacement  Risks include infection, bleeding, need further surgery, failure of therapy, ureter injury    Plan  Ancef on call to or  Proceed with above surgery

## 2022-08-08 NOTE — PROGRESS NOTES
Handoff report to [facility]. Pt is ready and awaiting surgery with sig other at bedside.

## 2022-08-08 NOTE — PROGRESS NOTES
1539 Patient arrived to PACU from OR. Monitors applied and alarms on. Report from [employer]. 1548 Patient tolerating ice chips. 1557 Patient turned and repositioned in bed.   1603 Patient transferred out of PACU to same day surgery. Report to [facility].

## 2022-08-08 NOTE — DISCHARGE INSTRUCTIONS
Motrin/tylenol/azo OTC PRN mild pain  Follow-up in 5-7 days for black string stent removal  Call or return to hospital with questions or concerns  No driving for 24 hours or on narcotics  Do not manipulate black string in urethra  No heavy activity for 7 days  Continue hydration and avoid constipation        [address]  [phone]    Do not drive, work around [address] or use equipment. Do not drink any alcoholic beverages. Do not smoke while alone. Avoid making important decisions. Plan to spend a quiet, relaxed evening @ home. Resume normal activities as you begin to feel better. Eat lightly for your first meal, then gradually increase your diet to what is normal for you. In case of nausea, avoid food and drink only clear liquids. Resume food as nausea ceases. Notify your surgeon if you experience fever, chills, large amount of bleeding, difficulty breathing, persistent nausea and vomiting or any other disturbing problem. Call for a follow-up appointment with your surgeon. Advance Care Planning  People with COVID-19 may have no symptoms, mild symptoms, such as fever, cough, and shortness of breath or they may have more severe illness, developing severe and fatal pneumonia. As a result, Advance Care Planning with attention to naming a health care decision maker (someone you trust to make healthcare decisions for you if you could not speak for yourself) and sharing other health care preferences is important BEFORE a possible health crisis. Please contact your Primary Care Provider to discuss Advance Care Planning.     Preventing the Spread of Coronavirus Disease 2019 in Homes and Residential Communities  For the most recent information go to [employer]    Prevention steps for People with confirmed or suspected COVID-19 (including persons under investigation) who do not need to be hospitalized  and   People with confirmed COVID-19 who were hospitalized and determined to be medically stable to go home    Your healthcare provider and public health staff will evaluate whether you can be cared for at home. If it is determined that you do not need to be hospitalized and can be isolated at home, you will be monitored by staff from your local or state health department. You should follow the prevention steps below until a healthcare provider or local or state health department says you can return to your normal activities. Stay home except to get medical care  People who are mildly ill with COVID-19 are able to isolate at home during their illness. You should restrict activities outside your home, except for getting medical care. Do not go to work, school, or public areas. Avoid using public transportation, ride-sharing, or taxis. Separate yourself from other people and animals in your home  People: As much as possible, you should stay in a specific room and away from other people in your home. Also, you should use a separate bathroom, if available. Animals: You should restrict contact with pets and other animals while you are sick with COVID-19, just like you would around other people. Although there have not been reports of pets or other animals becoming sick with COVID-19, it is still recommended that people sick with COVID-19 limit contact with animals until more information is known about the virus. When possible, have another member of your household care for your animals while you are sick. If you are sick with COVID-19, avoid contact with your pet, including petting, snuggling, being kissed or licked, and sharing food. If you must care for your pet or be around animals while you are sick, wash your hands before and after you interact with pets and wear a facemask. Call ahead before visiting your doctor  If you have a medical appointment, call the healthcare provider and tell them that you have or may have COVID-19. This will help the healthcare providers office take steps to keep other people from getting infected or exposed. Wear a facemask  You should wear a facemask when you are around other people (e.g., sharing a room or vehicle) or pets and before you enter a healthcare providers office. If you are not able to wear a facemask (for example, because it causes trouble breathing), then people who live with you should not stay in the same room with you, or they should wear a facemask if they enter your room. Cover your coughs and sneezes  Cover your mouth and nose with a tissue when you cough or sneeze. Throw used tissues in a lined trash can. Immediately wash your hands with soap and water for at least 20 seconds or, if soap and water are not available, clean your hands with an alcohol-based hand  that contains at least 60% alcohol. Clean your hands often  Wash your hands often with soap and water for at least 20 seconds, especially after blowing your nose, coughing, or sneezing; going to the bathroom; and before eating or preparing food. If soap and water are not readily available, use an alcohol-based hand  with at least 60% alcohol, covering all surfaces of your hands and rubbing them together until they feel dry. Soap and water are the best option if hands are visibly dirty. Avoid touching your eyes, nose, and mouth with unwashed hands. Avoid sharing personal household items  You should not share dishes, drinking glasses, cups, eating utensils, towels, or bedding with other people or pets in your home. After using these items, they should be washed thoroughly with soap and water. Clean all high-touch surfaces everyday  High touch surfaces include counters, tabletops, doorknobs, bathroom fixtures, toilets, phones, keyboards, tablets, and bedside tables. Also, clean any surfaces that may have blood, stool, or body fluids on them.  Use a household cleaning spray or wipe, according to the label instructions. Labels contain instructions for safe and effective use of the cleaning product including precautions you should take when applying the product, such as wearing gloves and making sure you have good ventilation during use of the product. Monitor your symptoms  Seek prompt medical attention if your illness is worsening (e.g., difficulty breathing). Before seeking care, call your healthcare provider and tell them that you have, or are being evaluated for, COVID-19. Put on a facemask before you enter the facility. These steps will help the healthcare providers office to keep other people in the office or waiting room from getting infected or exposed. Ask your healthcare provider to call the local or state health department. Persons who are placed under active monitoring or facilitated self-monitoring should follow instructions provided by their local health department or occupational health professionals, as appropriate. When working with your local health department check their available hours. If you have a medical emergency and need to call 911, notify the dispatch personnel that you have, or are being evaluated for COVID-19. If possible, put on a facemask before emergency medical services arrive. Discontinuing home isolation  Patients with confirmed COVID-19 should remain under home isolation precautions until the risk of secondary transmission to others is thought to be low. The decision to discontinue home isolation precautions should be made on a case-by-case basis, in consultation with healthcare providers and state and local health departments.

## 2022-08-09 NOTE — OP NOTE
[address] [place] [address], [address] [place] [address]                                OPERATIVE REPORT    PATIENT NAME: Naseem Fajardo            :        1985  MED REC NO:   [id number]                          ROOM:  ACCOUNT NO:   [de-identified]                           ADMIT DATE: 2022  PROVIDER:     Ulysses Admire, MD    DATE OF PROCEDURE:  2022    PREOPERATIVE DIAGNOSES:  1. Right ureter stone. 2.  Right renal stones. POSTOPERATIVE DIAGNOSIS:  1. Right renal stones x3. OPERATION PERFORMED:  1. Right ureteroscopy with holmium laser lithotripsy and basket stone  extraction. 2.  Cystoscopy and right ureter stent exchange. SURGEON:  Ulysses Admire, MD    ANESTHESIA:  General.    ESTIMATED BLOOD LOSS:  Minimal.    COMPLICATIONS:  None. SPECIMEN:  Right renal stone fragments. DRAINS PLACED:  4.8 Faroese variable right ureter stent with black  string. FINDINGS:  1.  No stones found in right ureter. 2.  Three stones found in right kidney, each was flat approximately 1-2  mm thick but 1.1 cm wide. Soft stones fragmented quickly into small  pieces. 3.  No concerning masses or lesions found in bladder. 4.  Several small Turner's plaques in right renal calyces. DISPOSITION:  Stable to PACU. INDICATIONS FOR PROCEDURE:  The patient is a 77-year-old female with a  recent history of a 4-mm right ureter stone with obstruction and several  right renal stones. She also had a pansensitive E. Coli urinary tract  infection. She currently has a right ureter stent in place. I reviewed  the risks, benefits, and alternative therapies. The patient elected to  proceed with surgery. OPERATIVE PROCEDURE:  The patient received preoperative antibiotics. She had compression boots in place. She was brought to the operating  room and placed on the operating room table.   A general anesthetic was  administered by the Anesthesia Service. The patient was then placed in dorsal lithotomy position and prepped and  draped in sterile fashion after being appropriately padded. Time-out  was performed per protocol. A 21-Lebanese cystoscope with a 30-degree lens was placed through the  urethra and into the bladder without difficulty. There was no  concerning masses or lesions found in the bladder. The bladder was  irrigated several times. Flexible graspers were used to remove the old  stent to the meatus and two sensor guidewires were placed into the right  collecting system. A rigid ureteroscope was placed over the second  sensor wire and advanced to the right UPJ and there was no stone found  in the right ureter. The rigid scope was removed and a ureteral access  sheath was placed over the working wire into the proximal right ureter. A LithoVue flexible ureteroscope was then advanced into the right renal  pelvis confirming there was no stone in the right proximal ureter. Three stones were found in the right renal pelvis. They were extremely  flat approximately 1-2 mm thick but 1.1 cm wide. Using a holmium laser  and 200-micron fiber, the stone was fragmented quickly into small little  pieces. The stones were soft and fragmented into small pieces quickly. A basket extraction device was used to remove the largest of the largest  of stone pieces, but the remaining pieces were too small to basket. I  evaluated the other calyces and no other large stone was found. There  were few Turner's plaques in few of the calyces. I elected to end the  procedure at this point in time. The flexible ureteroscope was slowly  removed as well as the access sheath confirming there was no stone in  the right ureter. A 4.8 Lebanese variable stent was placed into the right  collecting system with the aid of fluoroscopy. It appeared to have  adequate positioning on fluoroscopy.   The black string remained in place  for later stent removal. There was minimal blood loss. The bladder was  emptied and the patient was brought to the PACU in stable condition. The patient will be discharged with a prescription for Ceftin, Zofran,  and Norco.  The patient will follow up in our office in approximately  5-7 days for black string stent removal.  She will follow up in our  office in approximately 1 month with a renal ultrasound.         Emmett Choi MD    D: 08/08/2022 15:39:41       T: 08/08/2022 15:42:06     [email or web address]  Job#: [id number]     Doc#: [id number]    CC:

## 2022-08-12 LAB
STONE COMPOSITION: NORMAL
STONE DESCRIPTION: NORMAL
STONE MASS: 9 MG

## 2022-08-24 PROBLEM — N39.0 UTI (URINARY TRACT INFECTION): Status: RESOLVED | Noted: 2022-07-25 | Resolved: 2022-08-24

## 2022-11-01 ENCOUNTER — TELEPHONE (OUTPATIENT)
Dept: OBGYN | Age: 37
End: 2022-11-01

## 2023-06-12 PROBLEM — N39.0 URINARY TRACT INFECTION: Status: ACTIVE | Noted: 2023-06-12

## 2023-06-12 PROBLEM — E87.1 HYPONATREMIA: Status: ACTIVE | Noted: 2023-06-12

## 2023-06-12 PROBLEM — R65.10 SIRS (SYSTEMIC INFLAMMATORY RESPONSE SYNDROME) (HCC): Status: ACTIVE | Noted: 2023-06-12

## 2023-06-12 PROBLEM — R00.0 TACHYCARDIA: Status: ACTIVE | Noted: 2023-06-12

## 2023-07-12 PROBLEM — N39.0 URINARY TRACT INFECTION: Status: RESOLVED | Noted: 2023-06-12 | Resolved: 2023-07-12

## 2024-01-13 ENCOUNTER — APPOINTMENT (OUTPATIENT)
Dept: CT IMAGING | Age: 39
DRG: 720 | End: 2024-01-13
Payer: COMMERCIAL

## 2024-01-13 ENCOUNTER — HOSPITAL ENCOUNTER (INPATIENT)
Age: 39
DRG: 720 | End: 2024-01-13
Attending: EMERGENCY MEDICINE | Admitting: HOSPITALIST
Payer: COMMERCIAL

## 2024-01-13 DIAGNOSIS — N12 PYELONEPHRITIS: Primary | ICD-10-CM

## 2024-01-13 DIAGNOSIS — A41.9 SEPSIS WITHOUT ACUTE ORGAN DYSFUNCTION, DUE TO UNSPECIFIED ORGANISM (HCC): ICD-10-CM

## 2024-01-13 LAB
ALBUMIN SERPL-MCNC: 3.8 GM/DL (ref 3.4–5)
ALP BLD-CCNC: 77 IU/L (ref 40–129)
ALT SERPL-CCNC: 12 U/L (ref 10–40)
ANION GAP SERPL CALCULATED.3IONS-SCNC: 12 MMOL/L (ref 7–16)
AST SERPL-CCNC: 19 IU/L (ref 15–37)
BACTERIA: NEGATIVE /HPF
BASOPHILS ABSOLUTE: 0 K/CU MM
BASOPHILS RELATIVE PERCENT: 0.3 % (ref 0–1)
BILIRUB SERPL-MCNC: 0.6 MG/DL (ref 0–1)
BILIRUBIN URINE: NEGATIVE MG/DL
BLOOD, URINE: ABNORMAL
BUN SERPL-MCNC: 7 MG/DL (ref 6–23)
CALCIUM SERPL-MCNC: 8.6 MG/DL (ref 8.3–10.6)
CAST TYPE: ABNORMAL /HPF
CHLORIDE BLD-SCNC: 98 MMOL/L (ref 99–110)
CLARITY: CLEAR
CO2: 23 MMOL/L (ref 21–32)
COLOR: YELLOW
CREAT SERPL-MCNC: 0.7 MG/DL (ref 0.6–1.1)
CRYSTAL TYPE: NEGATIVE /HPF
DIFFERENTIAL TYPE: ABNORMAL
EOSINOPHILS ABSOLUTE: 0 K/CU MM
EOSINOPHILS RELATIVE PERCENT: 0.3 % (ref 0–3)
EPITHELIAL CELLS, UA: 5 /HPF
GFR SERPL CREATININE-BSD FRML MDRD: >60 ML/MIN/1.73M2
GLUCOSE SERPL-MCNC: 119 MG/DL (ref 70–99)
GLUCOSE, URINE: NEGATIVE MG/DL
HCG QUALITATIVE: NEGATIVE
HCT VFR BLD CALC: 36 % (ref 37–47)
HEMOGLOBIN: 11.4 GM/DL (ref 12.5–16)
IMMATURE NEUTROPHIL %: 1 % (ref 0–0.43)
INTERPRETATION: NORMAL
KETONES, URINE: NEGATIVE MG/DL
LACTIC ACID, SEPSIS: 1.3 MMOL/L (ref 0.4–2)
LEUKOCYTE ESTERASE, URINE: ABNORMAL
LYMPHOCYTES ABSOLUTE: 0.5 K/CU MM
LYMPHOCYTES RELATIVE PERCENT: 5.8 % (ref 24–44)
MCH RBC QN AUTO: 24.4 PG (ref 27–31)
MCHC RBC AUTO-ENTMCNC: 31.7 % (ref 32–36)
MCV RBC AUTO: 77.1 FL (ref 78–100)
MONOCYTES ABSOLUTE: 0.7 K/CU MM
MONOCYTES RELATIVE PERCENT: 8.4 % (ref 0–4)
NITRITE URINE, QUANTITATIVE: NEGATIVE
PDW BLD-RTO: 16.1 % (ref 11.7–14.9)
PH, URINE: 6.5 (ref 5–8)
PLATELET # BLD: 103 K/CU MM (ref 140–440)
PMV BLD AUTO: 9.8 FL (ref 7.5–11.1)
POTASSIUM SERPL-SCNC: 4.5 MMOL/L (ref 3.5–5.1)
PREGNANCY, URINE: NEGATIVE
PROTEIN UA: 30 MG/DL
RBC # BLD: 4.67 M/CU MM (ref 4.2–5.4)
RBC URINE: 10 /HPF (ref 0–6)
SEGMENTED NEUTROPHILS ABSOLUTE COUNT: 7.2 K/CU MM
SEGMENTED NEUTROPHILS RELATIVE PERCENT: 84.2 % (ref 36–66)
SODIUM BLD-SCNC: 133 MMOL/L (ref 135–145)
SPECIFIC GRAVITY UA: 1.01 (ref 1–1.03)
TOTAL IMMATURE NEUTOROPHIL: 0.09 K/CU MM
TOTAL PROTEIN: 6.6 GM/DL (ref 6.4–8.2)
UROBILINOGEN, URINE: 0.2 MG/DL (ref 0.2–1)
WBC # BLD: 8.6 K/CU MM (ref 4–10.5)
WBC UA: 5 /HPF (ref 0–5)

## 2024-01-13 PROCEDURE — 99285 EMERGENCY DEPT VISIT HI MDM: CPT

## 2024-01-13 PROCEDURE — 6360000002 HC RX W HCPCS: Performed by: EMERGENCY MEDICINE

## 2024-01-13 PROCEDURE — 6360000002 HC RX W HCPCS: Performed by: PHYSICIAN ASSISTANT

## 2024-01-13 PROCEDURE — 83605 ASSAY OF LACTIC ACID: CPT

## 2024-01-13 PROCEDURE — 6370000000 HC RX 637 (ALT 250 FOR IP): Performed by: PHYSICIAN ASSISTANT

## 2024-01-13 PROCEDURE — 84703 CHORIONIC GONADOTROPIN ASSAY: CPT

## 2024-01-13 PROCEDURE — 96365 THER/PROPH/DIAG IV INF INIT: CPT

## 2024-01-13 PROCEDURE — 96375 TX/PRO/DX INJ NEW DRUG ADDON: CPT

## 2024-01-13 PROCEDURE — 85025 COMPLETE CBC W/AUTO DIFF WBC: CPT

## 2024-01-13 PROCEDURE — 2580000003 HC RX 258: Performed by: EMERGENCY MEDICINE

## 2024-01-13 PROCEDURE — 74176 CT ABD & PELVIS W/O CONTRAST: CPT

## 2024-01-13 PROCEDURE — 81025 URINE PREGNANCY TEST: CPT

## 2024-01-13 PROCEDURE — 2580000003 HC RX 258: Performed by: PHYSICIAN ASSISTANT

## 2024-01-13 PROCEDURE — 80053 COMPREHEN METABOLIC PANEL: CPT

## 2024-01-13 PROCEDURE — 1200000000 HC SEMI PRIVATE

## 2024-01-13 PROCEDURE — 6370000000 HC RX 637 (ALT 250 FOR IP): Performed by: EMERGENCY MEDICINE

## 2024-01-13 PROCEDURE — 87086 URINE CULTURE/COLONY COUNT: CPT

## 2024-01-13 PROCEDURE — 87040 BLOOD CULTURE FOR BACTERIA: CPT

## 2024-01-13 PROCEDURE — 81001 URINALYSIS AUTO W/SCOPE: CPT

## 2024-01-13 RX ORDER — 0.9 % SODIUM CHLORIDE 0.9 %
30 INTRAVENOUS SOLUTION INTRAVENOUS ONCE
Status: COMPLETED | OUTPATIENT
Start: 2024-01-13 | End: 2024-01-13

## 2024-01-13 RX ORDER — SODIUM CHLORIDE 9 MG/ML
INJECTION, SOLUTION INTRAVENOUS PRN
Status: DISCONTINUED | OUTPATIENT
Start: 2024-01-13 | End: 2024-01-15 | Stop reason: HOSPADM

## 2024-01-13 RX ORDER — SODIUM CHLORIDE, SODIUM LACTATE, POTASSIUM CHLORIDE, CALCIUM CHLORIDE 600; 310; 30; 20 MG/100ML; MG/100ML; MG/100ML; MG/100ML
INJECTION, SOLUTION INTRAVENOUS CONTINUOUS
Status: DISPENSED | OUTPATIENT
Start: 2024-01-13 | End: 2024-01-14

## 2024-01-13 RX ORDER — SODIUM CHLORIDE 0.9 % (FLUSH) 0.9 %
5-40 SYRINGE (ML) INJECTION PRN
Status: DISCONTINUED | OUTPATIENT
Start: 2024-01-13 | End: 2024-01-15 | Stop reason: HOSPADM

## 2024-01-13 RX ORDER — HYDROCODONE BITARTRATE AND ACETAMINOPHEN 5; 325 MG/1; MG/1
1 TABLET ORAL EVERY 6 HOURS PRN
Status: DISCONTINUED | OUTPATIENT
Start: 2024-01-13 | End: 2024-01-14

## 2024-01-13 RX ORDER — ACETAMINOPHEN 325 MG/1
650 TABLET ORAL EVERY 6 HOURS PRN
Status: DISCONTINUED | OUTPATIENT
Start: 2024-01-13 | End: 2024-01-15 | Stop reason: HOSPADM

## 2024-01-13 RX ORDER — ONDANSETRON 2 MG/ML
4 INJECTION INTRAMUSCULAR; INTRAVENOUS EVERY 6 HOURS PRN
Status: DISCONTINUED | OUTPATIENT
Start: 2024-01-13 | End: 2024-01-15 | Stop reason: SDUPTHER

## 2024-01-13 RX ORDER — SODIUM CHLORIDE 0.9 % (FLUSH) 0.9 %
5-40 SYRINGE (ML) INJECTION EVERY 12 HOURS SCHEDULED
Status: DISCONTINUED | OUTPATIENT
Start: 2024-01-13 | End: 2024-01-15 | Stop reason: HOSPADM

## 2024-01-13 RX ORDER — ACETAMINOPHEN 500 MG
1000 TABLET ORAL ONCE
Status: COMPLETED | OUTPATIENT
Start: 2024-01-13 | End: 2024-01-13

## 2024-01-13 RX ORDER — KETOROLAC TROMETHAMINE 30 MG/ML
30 INJECTION, SOLUTION INTRAMUSCULAR; INTRAVENOUS ONCE
Status: COMPLETED | OUTPATIENT
Start: 2024-01-13 | End: 2024-01-13

## 2024-01-13 RX ORDER — ACETAMINOPHEN 650 MG/1
650 SUPPOSITORY RECTAL EVERY 6 HOURS PRN
Status: DISCONTINUED | OUTPATIENT
Start: 2024-01-13 | End: 2024-01-15 | Stop reason: HOSPADM

## 2024-01-13 RX ORDER — ONDANSETRON 2 MG/ML
4 INJECTION INTRAMUSCULAR; INTRAVENOUS EVERY 6 HOURS PRN
Status: DISCONTINUED | OUTPATIENT
Start: 2024-01-13 | End: 2024-01-15 | Stop reason: HOSPADM

## 2024-01-13 RX ORDER — POLYETHYLENE GLYCOL 3350 17 G/17G
17 POWDER, FOR SOLUTION ORAL DAILY PRN
Status: DISCONTINUED | OUTPATIENT
Start: 2024-01-13 | End: 2024-01-15 | Stop reason: HOSPADM

## 2024-01-13 RX ORDER — ONDANSETRON 4 MG/1
4 TABLET, ORALLY DISINTEGRATING ORAL EVERY 8 HOURS PRN
Status: DISCONTINUED | OUTPATIENT
Start: 2024-01-13 | End: 2024-01-15 | Stop reason: HOSPADM

## 2024-01-13 RX ORDER — KETOROLAC TROMETHAMINE 15 MG/ML
15 INJECTION, SOLUTION INTRAMUSCULAR; INTRAVENOUS EVERY 6 HOURS
Status: DISPENSED | OUTPATIENT
Start: 2024-01-13 | End: 2024-01-15

## 2024-01-13 RX ADMIN — ONDANSETRON 4 MG: 2 INJECTION INTRAMUSCULAR; INTRAVENOUS at 20:08

## 2024-01-13 RX ADMIN — ACETAMINOPHEN 650 MG: 325 TABLET ORAL at 20:05

## 2024-01-13 RX ADMIN — SODIUM CHLORIDE, PRESERVATIVE FREE 10 ML: 5 INJECTION INTRAVENOUS at 20:11

## 2024-01-13 RX ADMIN — HYDROCODONE BITARTRATE AND ACETAMINOPHEN 1 TABLET: 5; 325 TABLET ORAL at 18:31

## 2024-01-13 RX ADMIN — SODIUM CHLORIDE, POTASSIUM CHLORIDE, SODIUM LACTATE AND CALCIUM CHLORIDE: 600; 310; 30; 20 INJECTION, SOLUTION INTRAVENOUS at 18:45

## 2024-01-13 RX ADMIN — CEFTRIAXONE SODIUM 1000 MG: 1 INJECTION, POWDER, FOR SOLUTION INTRAMUSCULAR; INTRAVENOUS at 14:54

## 2024-01-13 RX ADMIN — ACETAMINOPHEN 1000 MG: 500 TABLET ORAL at 13:32

## 2024-01-13 RX ADMIN — KETOROLAC TROMETHAMINE 30 MG: 30 INJECTION, SOLUTION INTRAMUSCULAR at 14:43

## 2024-01-13 RX ADMIN — KETOROLAC TROMETHAMINE 15 MG: 15 INJECTION, SOLUTION INTRAMUSCULAR; INTRAVENOUS at 20:05

## 2024-01-13 RX ADMIN — SODIUM CHLORIDE 2000 ML: 9 INJECTION, SOLUTION INTRAVENOUS at 14:38

## 2024-01-13 ASSESSMENT — PAIN DESCRIPTION - PAIN TYPE
TYPE: ACUTE PAIN

## 2024-01-13 ASSESSMENT — PAIN DESCRIPTION - FREQUENCY
FREQUENCY: CONTINUOUS

## 2024-01-13 ASSESSMENT — PAIN - FUNCTIONAL ASSESSMENT
PAIN_FUNCTIONAL_ASSESSMENT: 0-10
PAIN_FUNCTIONAL_ASSESSMENT: PREVENTS OR INTERFERES WITH MANY ACTIVE NOT PASSIVE ACTIVITIES
PAIN_FUNCTIONAL_ASSESSMENT: PREVENTS OR INTERFERES SOME ACTIVE ACTIVITIES AND ADLS
PAIN_FUNCTIONAL_ASSESSMENT: ACTIVITIES ARE NOT PREVENTED
PAIN_FUNCTIONAL_ASSESSMENT: 0-10
PAIN_FUNCTIONAL_ASSESSMENT: ACTIVITIES ARE NOT PREVENTED
PAIN_FUNCTIONAL_ASSESSMENT: PREVENTS OR INTERFERES WITH MANY ACTIVE NOT PASSIVE ACTIVITIES
PAIN_FUNCTIONAL_ASSESSMENT: PREVENTS OR INTERFERES WITH MANY ACTIVE NOT PASSIVE ACTIVITIES
PAIN_FUNCTIONAL_ASSESSMENT: 0-10

## 2024-01-13 ASSESSMENT — PAIN DESCRIPTION - ORIENTATION
ORIENTATION: MID
ORIENTATION: RIGHT;LEFT;MID
ORIENTATION: MID
ORIENTATION: RIGHT;LEFT;MID
ORIENTATION: MID
ORIENTATION: RIGHT
ORIENTATION: RIGHT;LEFT

## 2024-01-13 ASSESSMENT — PAIN DESCRIPTION - DESCRIPTORS
DESCRIPTORS: ACHING;DISCOMFORT
DESCRIPTORS: ACHING
DESCRIPTORS: ACHING
DESCRIPTORS: ACHING;DISCOMFORT

## 2024-01-13 ASSESSMENT — PAIN DESCRIPTION - LOCATION
LOCATION: HEAD
LOCATION: BACK;HEAD
LOCATION: BACK;HEAD
LOCATION: HEAD

## 2024-01-13 ASSESSMENT — PAIN SCALES - GENERAL
PAINLEVEL_OUTOF10: 5
PAINLEVEL_OUTOF10: 3
PAINLEVEL_OUTOF10: 8
PAINLEVEL_OUTOF10: 8
PAINLEVEL_OUTOF10: 2
PAINLEVEL_OUTOF10: 4
PAINLEVEL_OUTOF10: 8
PAINLEVEL_OUTOF10: 3
PAINLEVEL_OUTOF10: 7

## 2024-01-13 ASSESSMENT — LIFESTYLE VARIABLES
HOW OFTEN DO YOU HAVE A DRINK CONTAINING ALCOHOL: NEVER
HOW MANY STANDARD DRINKS CONTAINING ALCOHOL DO YOU HAVE ON A TYPICAL DAY: PATIENT DOES NOT DRINK

## 2024-01-13 ASSESSMENT — PAIN DESCRIPTION - ONSET: ONSET: ON-GOING

## 2024-01-13 NOTE — H&P
V2.0  History and Physical      Name:  Marilou Carrera /Age/Sex: 1985  (38 y.o. female)   MRN & CSN:  [id number] & [id number] Encounter Date/Time: 2024 4:35 PM EST   Location:   PCP: Linh Weinberg PA       Hospital Day: 1    Assessment and Plan:   Marilou Carrera is a 38 y.o. female with a past medical history of kidney stones, pyelonephritis who presents with R flank pain.     Sepsis secondary to pyelonephritis   - sepsis criteria met with Tmax 102.6, tachycardia - improved on admission   -BP stable, LA WNL  - CT A/P showed new mild right periureteral inflammation with unchanged mild right perinephric edema, differential includes recently passed nephrolithiasis versus pyelonephritis  -UA with large blood, small LE  - last urine culture with Klebsiella pneumoniae, sensitive to cephalosporins  -Received IV Rocephin in ED, will continue  -scheduled Toradol, PRN Norco for pain   - follow-up blood cultures in ED    Thrombocytopenia  -Plts 103  - noted on last admission as well   - no signs of bleeding  - monitor CBC    Hyponatremia, mild  -Na 133  Continue IV fluids  -BMP in a.m.    Chronic microcytic anemia   - Hgb 11.4, stable  - check iron studies     Chronic R adnexal cyst   - continue outpatient follow-up     This patient was discussed with Dr. Bland. He was agreeable with the assessment and plan as dictated above.         Disposition:   Current Living situation: home  Expected Disposition: same  Estimated D/C: 1-2 days    Diet No diet orders on file   DVT Prophylaxis [] Lovenox, []  Heparin, [] SCDs, [x] Ambulation,  [] Eliquis, [] Xarelto []  Coumadin   Code Status Prior   Surrogate Decision Maker/ AKI Lacey      Personally reviewed Lab Studies CBC, CMP, UA and Imaging     Discussed management of the case with Dr. Bustamante.  Agree with decision to admit patient.    History from:     patient      History of Present Illness:     Chief Complaint: Right flank

## 2024-01-14 VITALS
TEMPERATURE: 99.9 F | HEART RATE: 94 BPM | BODY MASS INDEX: 31.4 KG/M2 | SYSTOLIC BLOOD PRESSURE: 124 MMHG | HEIGHT: 69 IN | WEIGHT: 212 LBS | OXYGEN SATURATION: 95 % | RESPIRATION RATE: 16 BRPM | DIASTOLIC BLOOD PRESSURE: 75 MMHG

## 2024-01-14 LAB
ALBUMIN SERPL-MCNC: 3.3 GM/DL (ref 3.4–5)
ALP BLD-CCNC: 65 IU/L (ref 40–129)
ALT SERPL-CCNC: 12 U/L (ref 10–40)
ANION GAP SERPL CALCULATED.3IONS-SCNC: 9 MMOL/L (ref 7–16)
AST SERPL-CCNC: 10 IU/L (ref 15–37)
B PARAP IS1001 DNA NPH QL NAA+NON-PROBE: NOT DETECTED
B PERT.PT PRMT NPH QL NAA+NON-PROBE: NOT DETECTED
BASOPHILS ABSOLUTE: 0 K/CU MM
BASOPHILS RELATIVE PERCENT: 0.2 % (ref 0–1)
BILIRUB SERPL-MCNC: 0.4 MG/DL (ref 0–1)
BUN SERPL-MCNC: 8 MG/DL (ref 6–23)
C PNEUM DNA NPH QL NAA+NON-PROBE: NOT DETECTED
CALCIUM SERPL-MCNC: 8.5 MG/DL (ref 8.3–10.6)
CHLORIDE BLD-SCNC: 102 MMOL/L (ref 99–110)
CO2: 22 MMOL/L (ref 21–32)
CREAT SERPL-MCNC: 0.6 MG/DL (ref 0.6–1.1)
DIFFERENTIAL TYPE: ABNORMAL
EOSINOPHILS ABSOLUTE: 0 K/CU MM
EOSINOPHILS RELATIVE PERCENT: 0 % (ref 0–3)
FERRITIN: 143 NG/ML (ref 15–150)
FLUAV H1 2009 PAN RNA NPH NAA+NON-PROBE: NOT DETECTED
FLUAV H1 RNA NPH QL NAA+NON-PROBE: NOT DETECTED
FLUAV H3 RNA NPH QL NAA+NON-PROBE: NOT DETECTED
FLUAV RNA NPH QL NAA+NON-PROBE: NOT DETECTED
FLUBV RNA NPH QL NAA+NON-PROBE: NOT DETECTED
GFR SERPL CREATININE-BSD FRML MDRD: >60 ML/MIN/1.73M2
GLUCOSE SERPL-MCNC: 113 MG/DL (ref 70–99)
HADV DNA NPH QL NAA+NON-PROBE: NOT DETECTED
HCOV 229E RNA NPH QL NAA+NON-PROBE: NOT DETECTED
HCOV HKU1 RNA NPH QL NAA+NON-PROBE: NOT DETECTED
HCOV NL63 RNA NPH QL NAA+NON-PROBE: NOT DETECTED
HCOV OC43 RNA NPH QL NAA+NON-PROBE: NOT DETECTED
HCT VFR BLD CALC: 31.4 % (ref 37–47)
HEMOGLOBIN: 9.8 GM/DL (ref 12.5–16)
HMPV RNA NPH QL NAA+NON-PROBE: NOT DETECTED
HPIV1 RNA NPH QL NAA+NON-PROBE: NOT DETECTED
HPIV2 RNA NPH QL NAA+NON-PROBE: NOT DETECTED
HPIV3 RNA NPH QL NAA+NON-PROBE: NOT DETECTED
HPIV4 RNA NPH QL NAA+NON-PROBE: NOT DETECTED
IMMATURE NEUTROPHIL %: 0.7 % (ref 0–0.43)
IRON: 8 UG/DL (ref 37–145)
LYMPHOCYTES ABSOLUTE: 0.6 K/CU MM
LYMPHOCYTES RELATIVE PERCENT: 10.5 % (ref 24–44)
M PNEUMO DNA NPH QL NAA+NON-PROBE: NOT DETECTED
MCH RBC QN AUTO: 24.1 PG (ref 27–31)
MCHC RBC AUTO-ENTMCNC: 31.2 % (ref 32–36)
MCV RBC AUTO: 77.1 FL (ref 78–100)
MONOCYTES ABSOLUTE: 0.5 K/CU MM
MONOCYTES RELATIVE PERCENT: 8.1 % (ref 0–4)
PCT TRANSFERRIN: 3 % (ref 10–44)
PDW BLD-RTO: 16 % (ref 11.7–14.9)
PLATELET # BLD: 114 K/CU MM (ref 140–440)
PMV BLD AUTO: 10.5 FL (ref 7.5–11.1)
POTASSIUM SERPL-SCNC: 3.6 MMOL/L (ref 3.5–5.1)
RBC # BLD: 4.07 M/CU MM (ref 4.2–5.4)
RSV RNA NPH QL NAA+NON-PROBE: NOT DETECTED
RV+EV RNA NPH QL NAA+NON-PROBE: NOT DETECTED
SARS-COV-2 RNA NPH QL NAA+NON-PROBE: NOT DETECTED
SEGMENTED NEUTROPHILS ABSOLUTE COUNT: 4.6 K/CU MM
SEGMENTED NEUTROPHILS RELATIVE PERCENT: 80.5 % (ref 36–66)
SODIUM BLD-SCNC: 133 MMOL/L (ref 135–145)
TOTAL IMMATURE NEUTOROPHIL: 0.04 K/CU MM
TOTAL IRON BINDING CAPACITY: 295 UG/DL (ref 250–450)
TOTAL PROTEIN: 6.3 GM/DL (ref 6.4–8.2)
UNSATURATED IRON BINDING CAPACITY: 287 UG/DL (ref 110–370)
WBC # BLD: 5.7 K/CU MM (ref 4–10.5)

## 2024-01-14 PROCEDURE — 6370000000 HC RX 637 (ALT 250 FOR IP): Performed by: NURSE PRACTITIONER

## 2024-01-14 PROCEDURE — 6360000002 HC RX W HCPCS: Performed by: HOSPITALIST

## 2024-01-14 PROCEDURE — 82728 ASSAY OF FERRITIN: CPT

## 2024-01-14 PROCEDURE — 6360000002 HC RX W HCPCS: Performed by: PHYSICIAN ASSISTANT

## 2024-01-14 PROCEDURE — 80053 COMPREHEN METABOLIC PANEL: CPT

## 2024-01-14 PROCEDURE — 85025 COMPLETE CBC W/AUTO DIFF WBC: CPT

## 2024-01-14 PROCEDURE — 0202U NFCT DS 22 TRGT SARS-COV-2: CPT

## 2024-01-14 PROCEDURE — 83540 ASSAY OF IRON: CPT

## 2024-01-14 PROCEDURE — 1200000000 HC SEMI PRIVATE

## 2024-01-14 PROCEDURE — 83550 IRON BINDING TEST: CPT

## 2024-01-14 PROCEDURE — 2580000003 HC RX 258: Performed by: HOSPITALIST

## 2024-01-14 PROCEDURE — 2580000003 HC RX 258: Performed by: PHYSICIAN ASSISTANT

## 2024-01-14 PROCEDURE — 6370000000 HC RX 637 (ALT 250 FOR IP): Performed by: PHYSICIAN ASSISTANT

## 2024-01-14 RX ORDER — OXYCODONE HYDROCHLORIDE 10 MG/1
10 TABLET ORAL EVERY 4 HOURS PRN
Status: DISCONTINUED | OUTPATIENT
Start: 2024-01-14 | End: 2024-01-15

## 2024-01-14 RX ORDER — OXYCODONE HYDROCHLORIDE 5 MG/1
5 TABLET ORAL EVERY 4 HOURS PRN
Status: DISCONTINUED | OUTPATIENT
Start: 2024-01-14 | End: 2024-01-15

## 2024-01-14 RX ADMIN — SODIUM CHLORIDE, POTASSIUM CHLORIDE, SODIUM LACTATE AND CALCIUM CHLORIDE: 600; 310; 30; 20 INJECTION, SOLUTION INTRAVENOUS at 05:06

## 2024-01-14 RX ADMIN — KETOROLAC TROMETHAMINE 15 MG: 15 INJECTION, SOLUTION INTRAMUSCULAR; INTRAVENOUS at 14:26

## 2024-01-14 RX ADMIN — SODIUM CHLORIDE: 9 INJECTION, SOLUTION INTRAVENOUS at 17:12

## 2024-01-14 RX ADMIN — PIPERACILLIN AND TAZOBACTAM 3375 MG: 3; .375 INJECTION, POWDER, FOR SOLUTION INTRAVENOUS; PARENTERAL at 09:45

## 2024-01-14 RX ADMIN — PIPERACILLIN AND TAZOBACTAM 3375 MG: 3; .375 INJECTION, POWDER, FOR SOLUTION INTRAVENOUS; PARENTERAL at 17:13

## 2024-01-14 RX ADMIN — ACETAMINOPHEN 650 MG: 325 TABLET ORAL at 17:06

## 2024-01-14 RX ADMIN — KETOROLAC TROMETHAMINE 15 MG: 15 INJECTION, SOLUTION INTRAMUSCULAR; INTRAVENOUS at 01:18

## 2024-01-14 RX ADMIN — SODIUM CHLORIDE, PRESERVATIVE FREE 10 ML: 5 INJECTION INTRAVENOUS at 20:11

## 2024-01-14 RX ADMIN — ONDANSETRON 4 MG: 2 INJECTION INTRAMUSCULAR; INTRAVENOUS at 08:31

## 2024-01-14 RX ADMIN — KETOROLAC TROMETHAMINE 15 MG: 15 INJECTION, SOLUTION INTRAMUSCULAR; INTRAVENOUS at 20:10

## 2024-01-14 RX ADMIN — SODIUM CHLORIDE: 9 INJECTION, SOLUTION INTRAVENOUS at 09:42

## 2024-01-14 RX ADMIN — KETOROLAC TROMETHAMINE 15 MG: 15 INJECTION, SOLUTION INTRAMUSCULAR; INTRAVENOUS at 08:31

## 2024-01-14 RX ADMIN — OXYCODONE HYDROCHLORIDE 5 MG: 5 TABLET ORAL at 17:06

## 2024-01-14 RX ADMIN — HYDROCODONE BITARTRATE AND ACETAMINOPHEN 1 TABLET: 5; 325 TABLET ORAL at 05:04

## 2024-01-14 RX ADMIN — ACETAMINOPHEN 650 MG: 325 TABLET ORAL at 08:31

## 2024-01-14 RX ADMIN — ONDANSETRON 4 MG: 2 INJECTION INTRAMUSCULAR; INTRAVENOUS at 17:10

## 2024-01-14 ASSESSMENT — PAIN DESCRIPTION - DESCRIPTORS
DESCRIPTORS: ACHING

## 2024-01-14 ASSESSMENT — PAIN DESCRIPTION - LOCATION
LOCATION: HEAD

## 2024-01-14 ASSESSMENT — PAIN SCALES - GENERAL
PAINLEVEL_OUTOF10: 6
PAINLEVEL_OUTOF10: 0
PAINLEVEL_OUTOF10: 4
PAINLEVEL_OUTOF10: 7
PAINLEVEL_OUTOF10: 4
PAINLEVEL_OUTOF10: 0
PAINLEVEL_OUTOF10: 0
PAINLEVEL_OUTOF10: 2
PAINLEVEL_OUTOF10: 4
PAINLEVEL_OUTOF10: 4
PAINLEVEL_OUTOF10: 6
PAINLEVEL_OUTOF10: 3
PAINLEVEL_OUTOF10: 3
PAINLEVEL_OUTOF10: 4

## 2024-01-14 ASSESSMENT — PAIN - FUNCTIONAL ASSESSMENT
PAIN_FUNCTIONAL_ASSESSMENT: ACTIVITIES ARE NOT PREVENTED

## 2024-01-14 ASSESSMENT — PAIN DESCRIPTION - ORIENTATION
ORIENTATION: ANTERIOR
ORIENTATION: MID
ORIENTATION: ANTERIOR
ORIENTATION: MID

## 2024-01-14 ASSESSMENT — PAIN DESCRIPTION - PAIN TYPE
TYPE: ACUTE PAIN

## 2024-01-14 ASSESSMENT — PAIN DESCRIPTION - ONSET
ONSET: ON-GOING

## 2024-01-14 ASSESSMENT — PAIN DESCRIPTION - FREQUENCY
FREQUENCY: CONTINUOUS

## 2024-01-14 NOTE — PROGRESS NOTES
Patient had a fever, given medication and one hour later the fever came down, still has a headache

## 2024-01-14 NOTE — PLAN OF CARE
Problem: Pain  Goal: Verbalizes/displays adequate comfort level or baseline comfort level  1/14/2024 1104 by Hayley Eduardo RN  Outcome: Progressing  1/13/2024 2325 by Sasha Hilliard RN  Outcome: Progressing     Problem: Safety - Adult  Goal: Free from fall injury  1/14/2024 1104 by Hayley Eduardo RN  Outcome: Progressing  1/13/2024 2325 by Sasha Hilliard RN  Outcome: Progressing

## 2024-01-14 NOTE — PROGRESS NOTES
V2.0  [facility] Hospitalist Progress Note      Name:  Marilou Carrera /Age/Sex: 1985  (38 y.o. female)   MRN & CSN:  [id number] & [id number] Encounter Date/Time: 2024 11:28 AM EST    Location:   PCP: Linh Weinberg PA       Hospital Day: 2    Assessment and Plan:   Marilou Carrera is a 38 y.o. female who presents with Acute pyelonephritis    Sepsis secondary to R pyelonephritis   - sepsis criteria met with Tmax 102.6, tachycardia - remains febrile this a.m. with Tmax of 102.7  - BP stable, LA WNL  - CT A/P showed new mild right periureteral inflammation with unchanged mild right perinephric edema, differential includes recently passed nephrolithiasis versus pyelonephritis  - UA with large blood, small LE  - last urine culture with Klebsiella pneumoniae, sensitive to cephalosporins  - Received IV Rocephin in ED, continued on admission; due to continued fever, will transition to Zosyn pending culture results  - scheduled Toradol, PRN Norco for pain; pain not well-controlled will adjust pain meds  - follow-up blood cultures in ED     Thrombocytopenia  - Plts 114  - noted on last admission as well   - no signs of bleeding  - monitor CBC     Hyponatremia, mild  - Na 133  - Continue IV fluids  - BMP in a.m.     Chronic microcytic anemia   - Hgb 9.8, likely dilutional  - Iron studies reviewed     Chronic R adnexal cyst   - continue outpatient follow-up     Diet ADULT DIET; Regular   DVT Prophylaxis [] Lovenox, []  Heparin, [x] SCDs, [] Ambulation,  [] Eliquis, [] Xarelto  [] Coumadin   Code Status Full Code   Disposition From: Home  Expected Disposition: Home  Estimated Date of Discharge: TBD  Patient requires continued admission due to pending cultures   Surrogate Decision Maker/ POA Joon Lacey     Subjective:      Patient seen and examined this morning.  She does currently endorse a headache.  She denies any flank pain, abdominal pain.  She did have a fever this

## 2024-01-14 NOTE — PROGRESS NOTES
THIS IS MY COURTNEY SUPERVISORY AND SHARED VISIT NOTE:    I personally saw the patient and made/approved the management plan and take responsibility for the patient management.    History:   Thursday had right lower back pain. Headaches, fever developed. Took only ibuprofen. No burning with urination. No sick contacts. Nausea and vomiting. No abdominal pain. Feels similar to past kidney infections. She is no longer having any right flank pain.     Exam:   No acute distress  S1s2 tachycardic  Cta b/l bs+  Abd soft nt nd  No right flank pain    MDM:   ***    I independently interpreted the following study(s): *** which show ***    I personally discussed the patient's management with ***, who state ***

## 2024-01-15 VITALS
HEART RATE: 80 BPM | TEMPERATURE: 97.5 F | BODY MASS INDEX: 32.14 KG/M2 | DIASTOLIC BLOOD PRESSURE: 88 MMHG | HEIGHT: 69 IN | OXYGEN SATURATION: 97 % | WEIGHT: 217 LBS | SYSTOLIC BLOOD PRESSURE: 124 MMHG | RESPIRATION RATE: 16 BRPM

## 2024-01-15 LAB
CULTURE: NORMAL
Lab: NORMAL
SPECIMEN: NORMAL

## 2024-01-15 PROCEDURE — 6360000002 HC RX W HCPCS: Performed by: HOSPITALIST

## 2024-01-15 PROCEDURE — 94761 N-INVAS EAR/PLS OXIMETRY MLT: CPT

## 2024-01-15 PROCEDURE — 6360000002 HC RX W HCPCS: Performed by: NURSE PRACTITIONER

## 2024-01-15 PROCEDURE — 2580000003 HC RX 258: Performed by: NURSE PRACTITIONER

## 2024-01-15 PROCEDURE — 2580000003 HC RX 258: Performed by: PHYSICIAN ASSISTANT

## 2024-01-15 PROCEDURE — 2580000003 HC RX 258: Performed by: HOSPITALIST

## 2024-01-15 PROCEDURE — 6370000000 HC RX 637 (ALT 250 FOR IP): Performed by: PHYSICIAN ASSISTANT

## 2024-01-15 PROCEDURE — 6360000002 HC RX W HCPCS: Performed by: PHYSICIAN ASSISTANT

## 2024-01-15 RX ORDER — 0.9 % SODIUM CHLORIDE 0.9 %
1000 INTRAVENOUS SOLUTION INTRAVENOUS ONCE
Status: COMPLETED | OUTPATIENT
Start: 2024-01-15 | End: 2024-01-15

## 2024-01-15 RX ORDER — OXYCODONE HYDROCHLORIDE 5 MG/1
5 TABLET ORAL EVERY 4 HOURS PRN
Status: DISCONTINUED | OUTPATIENT
Start: 2024-01-15 | End: 2024-01-15 | Stop reason: HOSPADM

## 2024-01-15 RX ORDER — OXYCODONE HYDROCHLORIDE 5 MG/1
2.5 TABLET ORAL EVERY 4 HOURS PRN
Status: DISCONTINUED | OUTPATIENT
Start: 2024-01-15 | End: 2024-01-15 | Stop reason: HOSPADM

## 2024-01-15 RX ORDER — CEFDINIR 300 MG/1
300 CAPSULE ORAL 2 TIMES DAILY
Qty: 8 CAPSULE | Refills: 0 | Status: SHIPPED | OUTPATIENT
Start: 2024-01-16 | End: 2024-01-20

## 2024-01-15 RX ORDER — MAGNESIUM SULFATE 1 G/100ML
1000 INJECTION INTRAVENOUS ONCE
Status: COMPLETED | OUTPATIENT
Start: 2024-01-15 | End: 2024-01-15

## 2024-01-15 RX ORDER — DEXAMETHASONE SODIUM PHOSPHATE 4 MG/ML
4 INJECTION, SOLUTION INTRA-ARTICULAR; INTRALESIONAL; INTRAMUSCULAR; INTRAVENOUS; SOFT TISSUE ONCE
Status: COMPLETED | OUTPATIENT
Start: 2024-01-15 | End: 2024-01-15

## 2024-01-15 RX ADMIN — DEXAMETHASONE SODIUM PHOSPHATE 4 MG: 4 INJECTION INTRA-ARTICULAR; INTRALESIONAL; INTRAMUSCULAR; INTRAVENOUS; SOFT TISSUE at 11:46

## 2024-01-15 RX ADMIN — ACETAMINOPHEN 650 MG: 325 TABLET ORAL at 02:11

## 2024-01-15 RX ADMIN — SODIUM CHLORIDE, PRESERVATIVE FREE 10 ML: 5 INJECTION INTRAVENOUS at 09:35

## 2024-01-15 RX ADMIN — MAGNESIUM SULFATE HEPTAHYDRATE 1000 MG: 1 INJECTION, SOLUTION INTRAVENOUS at 12:07

## 2024-01-15 RX ADMIN — KETOROLAC TROMETHAMINE 15 MG: 15 INJECTION, SOLUTION INTRAMUSCULAR; INTRAVENOUS at 09:35

## 2024-01-15 RX ADMIN — SODIUM CHLORIDE 1000 ML: 9 INJECTION, SOLUTION INTRAVENOUS at 12:01

## 2024-01-15 RX ADMIN — METOCLOPRAMIDE 10 MG: 5 INJECTION, SOLUTION INTRAMUSCULAR; INTRAVENOUS at 11:57

## 2024-01-15 RX ADMIN — PIPERACILLIN AND TAZOBACTAM 3375 MG: 3; .375 INJECTION, POWDER, FOR SOLUTION INTRAVENOUS; PARENTERAL at 02:14

## 2024-01-15 RX ADMIN — PIPERACILLIN AND TAZOBACTAM 3375 MG: 3; .375 INJECTION, POWDER, FOR SOLUTION INTRAVENOUS; PARENTERAL at 12:25

## 2024-01-15 ASSESSMENT — PAIN DESCRIPTION - LOCATION
LOCATION: HEAD

## 2024-01-15 ASSESSMENT — PAIN SCALES - GENERAL
PAINLEVEL_OUTOF10: 0
PAINLEVEL_OUTOF10: 3
PAINLEVEL_OUTOF10: 0
PAINLEVEL_OUTOF10: 2
PAINLEVEL_OUTOF10: 3
PAINLEVEL_OUTOF10: 3
PAINLEVEL_OUTOF10: 6
PAINLEVEL_OUTOF10: 0

## 2024-01-15 ASSESSMENT — PAIN DESCRIPTION - FREQUENCY
FREQUENCY: CONTINUOUS
FREQUENCY: CONTINUOUS

## 2024-01-15 ASSESSMENT — PAIN DESCRIPTION - ORIENTATION
ORIENTATION: RIGHT;LEFT
ORIENTATION: ANTERIOR
ORIENTATION: ANTERIOR

## 2024-01-15 ASSESSMENT — PAIN DESCRIPTION - ONSET
ONSET: GRADUAL
ONSET: ON-GOING

## 2024-01-15 ASSESSMENT — PAIN DESCRIPTION - DIRECTION
RADIATING_TOWARDS: NO
RADIATING_TOWARDS: NO

## 2024-01-15 ASSESSMENT — PAIN DESCRIPTION - DESCRIPTORS
DESCRIPTORS: ACHING

## 2024-01-15 ASSESSMENT — PAIN DESCRIPTION - PAIN TYPE
TYPE: ACUTE PAIN
TYPE: ACUTE PAIN

## 2024-01-15 NOTE — PLAN OF CARE
Problem: Pain  Goal: Verbalizes/displays adequate comfort level or baseline comfort level  1/14/2024 1934 by Shagufta Alonso RN  Outcome: Progressing  1/14/2024 1104 by Hayley Eduardo RN  Outcome: Progressing     Problem: Safety - Adult  Goal: Free from fall injury  1/14/2024 1934 by Shagufta Alonso RN  Outcome: Progressing  1/14/2024 1104 by Hayley Eduardo RN  Outcome: Progressing

## 2024-01-15 NOTE — PROGRESS NOTES
V2.0  [facility] Hospitalist Progress Note      Name:  Marilou Carrera /Age/Sex: 1985  (38 y.o. female)   MRN & CSN:  [id number] & [id number] Encounter Date/Time: 1/15/2024 11:28 AM EST    Location:   PCP: Linh Weinberg PA       Hospital Day: 3    Assessment and Plan:   Marilou Carrera is a 38 y.o. female who presents with Acute pyelonephritis    Sepsis secondary to R pyelonephritis   - sepsis criteria met with Tmax 102.6, tachycardia   - BP stable, LA WNL  - CT A/P showed new mild right periureteral inflammation with unchanged mild right perinephric edema, differential includes recently passed nephrolithiasis versus pyelonephritis  - UA with large blood, small LE  - last urine culture with Klebsiella pneumoniae, sensitive to cephalosporins  - Received IV Rocephin in ED, continued on admission; switched to Zosyn 2024; continue pending culture results  - scheduled Toradol, PRN oxycodone for pain  - follow-up blood cultures in ED    Migraine  - Does not have a history  - Will order magnesium supplementation and migraine cocktail x 1     Thrombocytopenia  - Plts 114  - noted on last admission as well   - no signs of bleeding  - monitor CBC     Hyponatremia, mild  - Na 133  - Continue IV fluids  - BMP in a.m.     Chronic microcytic anemia   - Hgb 9.8, likely dilutional  - Iron studies reviewed     Chronic R adnexal cyst   - continue outpatient follow-up     Diet ADULT DIET; Regular   DVT Prophylaxis [] Lovenox, []  Heparin, [x] SCDs, [] Ambulation,  [] Eliquis, [] Xarelto  [] Coumadin   Code Status Full Code   Disposition From: Home  Expected Disposition: Home  Estimated Date of Discharge: TBD  Patient requires continued admission due to pending cultures   Surrogate Decision Maker/ AKI Lacey     Subjective:      Patient seen and examined this morning.  She does currently endorse a headache.  She denies any flank pain, abdominal pain.  No further fevers since 1800

## 2024-01-15 NOTE — PLAN OF CARE
Problem: Pain  Goal: Verbalizes/displays adequate comfort level or baseline comfort level  Outcome: Completed     Problem: Safety - Adult  Goal: Free from fall injury  Outcome: Completed

## 2024-01-15 NOTE — CARE COORDINATION
Screened patient for CM needs. Patient has a PCP, has insurance that will cover medication cost. No needs identified at this time. If Case Management needs arise, please consult Case Management.

## 2024-01-15 NOTE — DISCHARGE SUMMARY
V2.0  Discharge Summary    Name:  Marilou Carrera /Age/Sex: 1985 (38 y.o. female)   Admit Date: 2024  Discharge Date: 1/15/24    MRN & CSN:  [id number] & [id number] Encounter Date and Time 1/15/24 5:19 PM EST    Attending:  Daquan Bland MD Discharging Provider: CHELSEA Lin - CNP       Hospital Course:     Brief HPI: Marilou Carrera is a 38 y.o. female who presented with acute pyelonephritis.    Sepsis secondary to R pyelonephritis-resolved  - sepsis criteria met with Tmax 102.6, tachycardia   - BP stable, LA WNL  - CT A/P showed new mild right periureteral inflammation with unchanged mild right perinephric edema, differential includes recently passed nephrolithiasis versus pyelonephritis  - UA with large blood, small LE; culture with less than 50,000 mixed skin/urogenital srinivas  - last urine culture with Klebsiella pneumoniae, sensitive to cephalosporins  - Received IV Rocephin in ED, continued on admission; switched to Zosyn 2024; will discharge with Omnicef for total of 7 days antibiotic coverage  - Blood cultures no growth at 24 hours     Migraine-resolved  - Does not have a history  - Resolved after migraine cocktail x 1     Thrombocytopenia  - Plts 114  - noted on last admission as well   - no signs of bleeding  - Will need to establish with PCP for continued monitoring     Hyponatremia, mild  - Na 133  - Will need to establish with PCP for continued monitoring     Chronic microcytic anemia   - Hgb 9.8, likely dilutional  - Iron studies reviewed  - Will need to establish with PCP for continued monitoring     Chronic R adnexal cyst   - continue outpatient follow-up   - Has seen Dr. Mcghee in the past    The patient expressed appropriate understanding of, and agreement with the discharge recommendations, medications, and plan.     Consults this admission:  IP CONSULT TO HOSPITALIST    Discharge Diagnosis:   Acute pyelonephritis    Discharge Instruction:   Follow up

## 2024-01-15 NOTE — PROGRESS NOTES
Discharge instructions given to patient, all questions answered, verbalized understanding, patient ambulated to car per her request with all belongings to be driven home by her mother.

## 2024-01-16 NOTE — ED PROVIDER NOTES
37 - 47 %    MCV 77.1 (L) 78 - 100 FL    MCH 24.1 (L) 27 - 31 PG    MCHC 31.2 (L) 32.0 - 36.0 %    RDW 16.0 (H) 11.7 - 14.9 %    Platelets 114 (L) 140 - 440 K/CU MM    MPV 10.5 7.5 - 11.1 FL    Differential Type AUTOMATED DIFFERENTIAL     Segs Relative 80.5 (H) 36 - 66 %    Lymphocytes % 10.5 (L) 24 - 44 %    Monocytes % 8.1 (H) 0 - 4 %    Eosinophils % 0.0 0 - 3 %    Basophils % 0.2 0 - 1 %    Segs Absolute 4.6 K/CU MM    Lymphocytes Absolute 0.6 K/CU MM    Monocytes Absolute 0.5 K/CU MM    Eosinophils Absolute 0.0 K/CU MM    Basophils Absolute 0.0 K/CU MM    Immature Neutrophil % 0.7 (H) 0 - 0.43 %    Total Immature Neutrophil 0.04 K/CU MM   Iron and TIBC   Result Value Ref Range    Iron 8 (L) 37 - 145 ug/dL    UIBC 287 110 - 370 ug/dL    TIBC 295 250 - 450 ug/dL    Transferrin % 3 (L) 10 - 44 %   Ferritin   Result Value Ref Range    Ferritin 143 15 - 150 NG/ML      Radiographs (if obtained):  [] The following radiograph was interpreted by myself in the absence of a radiologist:   [x] Radiologist's Report Reviewed:  CT ABDOMEN PELVIS WO CONTRAST Additional Contrast? None   Final Result   1. New mild right periureteral inflammation with unchanged mild right   perinephric edema.  The differential diagnosis includes a recently passed   nephrolithiasis versus pyelonephritis; correlate with urinalysis.   2. Unchanged 5.9 cm right adnexal cyst, likely ovarian in origin; see prior   recommendations.               EKG (if obtained): (All EKG's are interpreted by myself in the absence of a cardiologist)    Chart review shows recent radiographs:  CT ABDOMEN PELVIS WO CONTRAST Additional Contrast? None    Result Date: 1/13/2024  EXAMINATION: CT OF THE ABDOMEN AND PELVIS WITHOUT CONTRAST 1/13/2024 3:36 pm TECHNIQUE: CT of the abdomen and pelvis was performed without the administration of intravenous contrast. Multiplanar reformatted images are provided for review. Automated exposure control, iterative reconstruction, and/or

## 2024-01-19 LAB
CULTURE: NORMAL
CULTURE: NORMAL
Lab: NORMAL
Lab: NORMAL
SPECIMEN: NORMAL
SPECIMEN: NORMAL

## 2024-08-19 ENCOUNTER — OFFICE VISIT (OUTPATIENT)
Dept: OBGYN | Age: 39
End: 2024-08-19
Payer: COMMERCIAL

## 2024-08-19 ENCOUNTER — HOSPITAL ENCOUNTER (OUTPATIENT)
Age: 39
Setting detail: SPECIMEN
Discharge: HOME | End: 2024-08-19
Payer: COMMERCIAL

## 2024-08-19 VITALS
BODY MASS INDEX: 31.84 KG/M2 | HEIGHT: 69 IN | SYSTOLIC BLOOD PRESSURE: 169 MMHG | WEIGHT: 215 LBS | DIASTOLIC BLOOD PRESSURE: 90 MMHG

## 2024-08-19 DIAGNOSIS — Z11.51 ENCOUNTER FOR SCREENING FOR HUMAN PAPILLOMAVIRUS (HPV): ICD-10-CM

## 2024-08-19 DIAGNOSIS — Z12.31 SCREENING MAMMOGRAM FOR BREAST CANCER: ICD-10-CM

## 2024-08-19 DIAGNOSIS — I10 ESSENTIAL HYPERTENSION, BENIGN: ICD-10-CM

## 2024-08-19 DIAGNOSIS — Z01.419 ENCOUNTER FOR ANNUAL ROUTINE GYNECOLOGICAL EXAMINATION: Primary | ICD-10-CM

## 2024-08-19 DIAGNOSIS — N92.1 MENOMETRORRHAGIA: ICD-10-CM

## 2024-08-19 PROCEDURE — 36415 COLL VENOUS BLD VENIPUNCTURE: CPT | Performed by: OBSTETRICS & GYNECOLOGY

## 2024-08-19 PROCEDURE — 3080F DIAST BP >= 90 MM HG: CPT | Performed by: OBSTETRICS & GYNECOLOGY

## 2024-08-19 PROCEDURE — 3077F SYST BP >= 140 MM HG: CPT | Performed by: OBSTETRICS & GYNECOLOGY

## 2024-08-19 PROCEDURE — 99396 PREV VISIT EST AGE 40-64: CPT | Performed by: OBSTETRICS & GYNECOLOGY

## 2024-08-19 PROCEDURE — 88142 CYTOPATH C/V THIN LAYER: CPT

## 2024-08-19 PROCEDURE — 87624 HPV HI-RISK TYP POOLED RSLT: CPT

## 2024-08-19 SDOH — ECONOMIC STABILITY: FOOD INSECURITY: WITHIN THE PAST 12 MONTHS, YOU WORRIED THAT YOUR FOOD WOULD RUN OUT BEFORE YOU GOT MONEY TO BUY MORE.: NEVER TRUE

## 2024-08-19 SDOH — ECONOMIC STABILITY: INCOME INSECURITY: HOW HARD IS IT FOR YOU TO PAY FOR THE VERY BASICS LIKE FOOD, HOUSING, MEDICAL CARE, AND HEATING?: SOMEWHAT HARD

## 2024-08-19 SDOH — ECONOMIC STABILITY: FOOD INSECURITY: WITHIN THE PAST 12 MONTHS, THE FOOD YOU BOUGHT JUST DIDN'T LAST AND YOU DIDN'T HAVE MONEY TO GET MORE.: NEVER TRUE

## 2024-08-19 ASSESSMENT — PATIENT HEALTH QUESTIONNAIRE - PHQ9
SUM OF ALL RESPONSES TO PHQ9 QUESTIONS 1 & 2: 0
1. LITTLE INTEREST OR PLEASURE IN DOING THINGS: NOT AT ALL
SUM OF ALL RESPONSES TO PHQ QUESTIONS 1-9: 0
2. FEELING DOWN, DEPRESSED OR HOPELESS: NOT AT ALL

## 2024-08-20 LAB
DEPRECATED RDW RBC AUTO: 16.3 % (ref 12.4–15.4)
FSH SERPL-ACNC: 6.6 MIU/ML
HCG SERPL QL: NEGATIVE
HCT VFR BLD AUTO: 36.7 % (ref 36–48)
HGB BLD-MCNC: 11.9 G/DL (ref 12–16)
MCH RBC QN AUTO: 22.9 PG (ref 26–34)
MCHC RBC AUTO-ENTMCNC: 32.5 G/DL (ref 31–36)
MCV RBC AUTO: 70.4 FL (ref 80–100)
PLATELET # BLD AUTO: 211 K/UL (ref 135–450)
PMV BLD AUTO: 9.1 FL (ref 5–10.5)
PROLACTIN SERPL IA-MCNC: 9.3 NG/ML
RBC # BLD AUTO: 5.21 M/UL (ref 4–5.2)
TSH SERPL DL<=0.005 MIU/L-ACNC: 2.58 UIU/ML (ref 0.27–4.2)
WBC # BLD AUTO: 6.7 K/UL (ref 4–11)

## 2024-08-21 LAB
SHBG SERPL-SCNC: 39 NMOL/L (ref 25–122)
TESTOST FREE SERPL-MCNC: 2.7 PG/ML (ref 1.3–9.2)
TESTOST SERPL-MCNC: 17 NG/DL (ref 8–48)

## 2024-08-23 LAB — HPV HIGH RISK: NOT DETECTED

## 2024-09-19 ENCOUNTER — OFFICE VISIT (OUTPATIENT)
Dept: OBGYN | Age: 39
End: 2024-09-19
Payer: COMMERCIAL

## 2024-09-19 VITALS
WEIGHT: 215 LBS | SYSTOLIC BLOOD PRESSURE: 149 MMHG | DIASTOLIC BLOOD PRESSURE: 87 MMHG | BODY MASS INDEX: 31.84 KG/M2 | HEIGHT: 69 IN

## 2024-09-19 DIAGNOSIS — N92.1 MENOMETRORRHAGIA: ICD-10-CM

## 2024-09-19 DIAGNOSIS — N83.202 LEFT OVARIAN CYST: Primary | ICD-10-CM

## 2024-09-19 DIAGNOSIS — D50.9 MICROCYTIC ANEMIA: ICD-10-CM

## 2024-09-19 PROCEDURE — 3077F SYST BP >= 140 MM HG: CPT | Performed by: OBSTETRICS & GYNECOLOGY

## 2024-09-19 PROCEDURE — 3079F DIAST BP 80-89 MM HG: CPT | Performed by: OBSTETRICS & GYNECOLOGY

## 2024-09-19 PROCEDURE — G8427 DOCREV CUR MEDS BY ELIG CLIN: HCPCS | Performed by: OBSTETRICS & GYNECOLOGY

## 2024-09-19 PROCEDURE — 1036F TOBACCO NON-USER: CPT | Performed by: OBSTETRICS & GYNECOLOGY

## 2024-09-19 PROCEDURE — 36415 COLL VENOUS BLD VENIPUNCTURE: CPT | Performed by: OBSTETRICS & GYNECOLOGY

## 2024-09-19 PROCEDURE — G8417 CALC BMI ABV UP PARAM F/U: HCPCS | Performed by: OBSTETRICS & GYNECOLOGY

## 2024-09-19 PROCEDURE — 99213 OFFICE O/P EST LOW 20 MIN: CPT | Performed by: OBSTETRICS & GYNECOLOGY

## 2024-09-20 LAB
DEPRECATED RDW RBC AUTO: 17.8 % (ref 12.4–15.4)
FERRITIN SERPL IA-MCNC: 9.5 NG/ML (ref 15–150)
HCT VFR BLD AUTO: 33.6 % (ref 36–48)
HGB BLD-MCNC: 11 G/DL (ref 12–16)
IRON SATN MFR SERPL: 7 % (ref 15–50)
IRON SERPL-MCNC: 33 UG/DL (ref 37–145)
MCH RBC QN AUTO: 23.4 PG (ref 26–34)
MCHC RBC AUTO-ENTMCNC: 32.8 G/DL (ref 31–36)
MCV RBC AUTO: 71.1 FL (ref 80–100)
PLATELET # BLD AUTO: 201 K/UL (ref 135–450)
PMV BLD AUTO: 9 FL (ref 5–10.5)
RBC # BLD AUTO: 4.72 M/UL (ref 4–5.2)
TIBC SERPL-MCNC: 469 UG/DL (ref 260–445)
WBC # BLD AUTO: 5.9 K/UL (ref 4–11)

## 2024-09-20 RX ORDER — FERROUS SULFATE 325(65) MG
650 TABLET ORAL EVERY OTHER DAY
Qty: 30 TABLET | Refills: 3 | Status: SHIPPED | OUTPATIENT
Start: 2024-09-20

## 2024-10-07 ENCOUNTER — PREP FOR PROCEDURE (OUTPATIENT)
Dept: OBGYN | Age: 39
End: 2024-10-07

## 2024-10-07 DIAGNOSIS — N83.202 LEFT OVARIAN CYST: ICD-10-CM

## 2024-10-07 DIAGNOSIS — D50.9 MICROCYTIC ANEMIA: ICD-10-CM

## 2024-10-07 DIAGNOSIS — N92.1 MENOMETRORRHAGIA: ICD-10-CM

## 2024-10-31 ENCOUNTER — OFFICE VISIT (OUTPATIENT)
Dept: OBGYN | Age: 39
End: 2024-10-31
Payer: COMMERCIAL

## 2024-10-31 VITALS
HEART RATE: 81 BPM | BODY MASS INDEX: 31.57 KG/M2 | SYSTOLIC BLOOD PRESSURE: 154 MMHG | DIASTOLIC BLOOD PRESSURE: 97 MMHG | WEIGHT: 213.8 LBS

## 2024-10-31 DIAGNOSIS — N83.202 LEFT OVARIAN CYST: ICD-10-CM

## 2024-10-31 DIAGNOSIS — D50.0 IRON DEFICIENCY ANEMIA DUE TO CHRONIC BLOOD LOSS: ICD-10-CM

## 2024-10-31 DIAGNOSIS — N92.1 MENOMETRORRHAGIA: Primary | ICD-10-CM

## 2024-10-31 PROCEDURE — G8428 CUR MEDS NOT DOCUMENT: HCPCS | Performed by: OBSTETRICS & GYNECOLOGY

## 2024-10-31 PROCEDURE — G8417 CALC BMI ABV UP PARAM F/U: HCPCS | Performed by: OBSTETRICS & GYNECOLOGY

## 2024-10-31 PROCEDURE — 1036F TOBACCO NON-USER: CPT | Performed by: OBSTETRICS & GYNECOLOGY

## 2024-10-31 PROCEDURE — 99213 OFFICE O/P EST LOW 20 MIN: CPT | Performed by: OBSTETRICS & GYNECOLOGY

## 2024-10-31 PROCEDURE — G8484 FLU IMMUNIZE NO ADMIN: HCPCS | Performed by: OBSTETRICS & GYNECOLOGY

## 2024-10-31 PROCEDURE — 3080F DIAST BP >= 90 MM HG: CPT | Performed by: OBSTETRICS & GYNECOLOGY

## 2024-10-31 PROCEDURE — 3077F SYST BP >= 140 MM HG: CPT | Performed by: OBSTETRICS & GYNECOLOGY

## 2024-10-31 NOTE — PROGRESS NOTES
10/31/24    Marilou Carrera  1985    Chief Complaint   Patient presents with    Ovarian Cyst     P c/o ovarian cyst, menometrorrhagia. Had u/s. Consent signed.        Marilou Carrera is a 39 y.o. female who presents today for evaluation of menometrorrhagia.    Past Medical History:   Diagnosis Date    Acute appendicitis 04/11/2012    Chemical pregnancy     Hypertension     Irregular menses     Kidney infection     Kidney stones 07/08/2022    Obesity     Prior pregnancy with fetal demise and current pregnancy 02/2007    36Wks       Results    Collected Updated Procedure    09/19/2024 1524 09/20/2024 0027 Ferritin [[id number]]   (Abnormal)   Blood    Component Value Units   Ferritin 9.5 Low  ng/mL          09/19/2024 1524 09/20/2024 0027 Iron and TIBC [[id number]]   (Abnormal)   Blood    Component Value Units   Iron 33 Low  ug/dL   TIBC 469 High  ug/dL   Iron % Saturation 7 Low  %          09/19/2024 1524 09/20/2024 0009 CBC [[id number]]   (Abnormal)   Blood    Component Value Units   WBC 5.9 K/uL   RBC 4.72 M/uL   Hemoglobin 11.0 Low  g/dL   Hematocrit 33.6 Low  %   MCV 71.1 Low  fL   MCH 23.4 Low  pg   MCHC 32.8 g/dL   RDW 17.8 High  %   Platelets 201 K/uL   MPV 9.0 fL          08/19/2024 1627 08/28/2024 1031 GYN Cytology [[id number]]  Component Value   No component results          08/19/2024 1627 08/23/2024 0507 Human papillomavirus (HPV) DNA probe cervical brush high risk [[id number]] Component Value   HPV High Risk NOT DETECTED           08/19/2024 1627 08/20/2024 0045 CBC [[id number]]   (Abnormal)   Blood    Component Value Units   WBC 6.7 K/uL   RBC 5.21 High  M/uL   Hemoglobin 11.9 Low  g/dL   Hematocrit 36.7 %   MCV 70.4 Low  fL   MCH 22.9 Low  pg   MCHC 32.5 g/dL   RDW 16.3 High  %   Platelets 211 K/uL   MPV 9.1 fL          08/19/2024 1627 08/20/2024 0025 HCG Qualitative, Serum [[id number]]   Blood    Component Value   Preg, Serum Negative          08/19/2024 1627 08/20/2024 0043

## 2024-11-04 RX ORDER — LOSARTAN POTASSIUM AND HYDROCHLOROTHIAZIDE 12.5; 5 MG/1; MG/1
1 TABLET ORAL DAILY
COMMUNITY

## 2024-11-04 NOTE — PROGRESS NOTES
11/4/24 - 2nd message:  CHAS with my call-back # concerning  surgery @ [facility] on  11/6/24.  Please call the PAT Nurse for a phone assessment and surgery instructions.

## 2024-11-04 NOTE — PROGRESS NOTES
.Surgery @ [facility] on 11/6/24 at 1330, arrival 1130    NOTHING TO EAT OR DRINK AFTER MIDNIGHT DAY OF SURGERY    1. Enter thru the hospital main entrance on day of surgery, check in at the Information Desk. If you arrive prior to 6:00am, enter thru the ER entrance.    2. Follow the directions as prescribed by the doctor for your procedure and medications.         Morning of surgery take:  No medications         Stop vitamins, supplements and NSAIDS:  NOW    3. Check with your Doctor regarding stopping blood thinners and follow their instructions.    4. Do not smoke, vape or use chewing tobacco morning of surgery. Do not drink any alcoholic beverages 24 hours prior to surgery.       This includes NA Beer. No street drugs 7 days prior to surgery.    5. If you have dentures, contacts of glasses they will be removed before going to the OR; please bring a case.    6. Please bring picture ID, insurance card, paperwork from the doctor’s office (H & P, Consent, & card for implantable devices).    7. Take a shower with an antibacterial soap the night before surgery and the morning of surgery. Do not put anything on your skin      After your morning shower.    8. You will need a responsible adult to drive you home and check on you after surgery.

## 2024-11-05 ENCOUNTER — ANESTHESIA EVENT (OUTPATIENT)
Dept: OPERATING ROOM | Age: 39
End: 2024-11-05
Payer: COMMERCIAL

## 2024-11-05 NOTE — ANESTHESIA PRE PROCEDURE
Department of Anesthesiology  Preprocedure Note       Name:  Marilou Carrera   Age:  39 y.o.  :  1985                                          MRN:  [id number]         Date:  2024      Surgeon: Surgeon(s):  Blake De Luna MD    Procedure: Procedure(s):  DILATATION AND CURETTAGE HYSTEROSCOPY/BALLOON ABLATION  LAPAROSCOPY EXPLORATORY WITH LEFT SALPINGO OOPHORECTOMY    Medications prior to admission:   Prior to Admission medications    Medication Sig Start Date End Date Taking? Authorizing Provider   losartan-hydroCHLOROthiazide (HYZAAR) 50-12.5 MG per tablet Take 1 tablet by mouth daily    Provider, MD Karla   ferrous sulfate (IRON 325) 325 (65 Fe) MG tablet Take 2 tablets by mouth every other day 24   Blake De Luna MD   acetaminophen (TYLENOL) 500 MG tablet Take 2 tablets by mouth every 6 hours as needed for Pain    Provider, MD Karla   ibuprofen (ADVIL;MOTRIN) 800 MG tablet Take 1 tablet by mouth every 8 hours 22   Fariba Patel APRN - CNM   blood glucose monitor strips Test 4 times a day & as needed for symptoms of irregular blood glucose. Dispense sufficient amount for indicated testing frequency plus additional to accommodate PRN testing needs.  Patient not taking: Reported on 2022 4/15/22 7/27/22  Blake De Luna MD       Current medications:    No current facility-administered medications for this encounter.     Current Outpatient Medications   Medication Sig Dispense Refill   • losartan-hydroCHLOROthiazide (HYZAAR) 50-12.5 MG per tablet Take 1 tablet by mouth daily     • ferrous sulfate (IRON 325) 325 (65 Fe) MG tablet Take 2 tablets by mouth every other day 30 tablet 3   • acetaminophen (TYLENOL) 500 MG tablet Take 2 tablets by mouth every 6 hours as needed for Pain     • ibuprofen (ADVIL;MOTRIN) 800 MG tablet Take 1 tablet by mouth every 8 hours 120 tablet 3       Allergies:  No Known Allergies    Problem List:    Patient Active

## 2024-11-06 ENCOUNTER — ANESTHESIA (OUTPATIENT)
Dept: OPERATING ROOM | Age: 39
End: 2024-11-06
Payer: COMMERCIAL

## 2024-11-06 ENCOUNTER — HOSPITAL ENCOUNTER (OUTPATIENT)
Age: 39
Setting detail: OUTPATIENT SURGERY
Discharge: HOME | End: 2024-11-06
Attending: OBSTETRICS & GYNECOLOGY | Admitting: OBSTETRICS & GYNECOLOGY
Payer: COMMERCIAL

## 2024-11-06 VITALS
TEMPERATURE: 98 F | BODY MASS INDEX: 31.55 KG/M2 | OXYGEN SATURATION: 99 % | WEIGHT: 213 LBS | RESPIRATION RATE: 18 BRPM | SYSTOLIC BLOOD PRESSURE: 148 MMHG | HEART RATE: 77 BPM | HEIGHT: 69 IN | DIASTOLIC BLOOD PRESSURE: 94 MMHG

## 2024-11-06 DIAGNOSIS — D50.9 MICROCYTIC ANEMIA: ICD-10-CM

## 2024-11-06 DIAGNOSIS — N92.1 MENOMETRORRHAGIA: ICD-10-CM

## 2024-11-06 DIAGNOSIS — G89.18 POST-OP PAIN: Primary | ICD-10-CM

## 2024-11-06 DIAGNOSIS — N83.202 LEFT OVARIAN CYST: ICD-10-CM

## 2024-11-06 LAB
BASOPHILS # BLD: 0.04 K/UL
BASOPHILS NFR BLD: 1 % (ref 0–1)
EOSINOPHIL # BLD: 0.13 K/UL
EOSINOPHILS RELATIVE PERCENT: 3 % (ref 0–3)
ERYTHROCYTE [DISTWIDTH] IN BLOOD BY AUTOMATED COUNT: 15.9 % (ref 11.7–14.9)
HCT VFR BLD AUTO: 36.5 % (ref 37–47)
HGB BLD-MCNC: 11.3 G/DL (ref 12.5–16)
IMM GRANULOCYTES # BLD AUTO: 0.01 K/UL
IMM GRANULOCYTES NFR BLD: 0 %
LYMPHOCYTES NFR BLD: 1.74 K/UL
LYMPHOCYTES RELATIVE PERCENT: 38 % (ref 24–44)
MCH RBC QN AUTO: 22.8 PG (ref 27–31)
MCHC RBC AUTO-ENTMCNC: 31 G/DL (ref 32–36)
MCV RBC AUTO: 73.6 FL (ref 78–100)
MONOCYTES NFR BLD: 0.35 K/UL
MONOCYTES NFR BLD: 8 % (ref 0–4)
NEUTROPHILS NFR BLD: 51 % (ref 36–66)
NEUTS SEG NFR BLD: 2.34 K/UL
PLATELET # BLD AUTO: 182 K/UL (ref 140–440)
PMV BLD AUTO: 10 FL (ref 7.5–11.1)
RBC # BLD AUTO: 4.96 M/UL (ref 4.2–5.4)
WBC OTHER # BLD: 4.6 K/UL (ref 4–10.5)

## 2024-11-06 PROCEDURE — 85025 COMPLETE CBC W/AUTO DIFF WBC: CPT

## 2024-11-06 PROCEDURE — 87070 CULTURE OTHR SPECIMN AEROBIC: CPT

## 2024-11-06 PROCEDURE — 88305 TISSUE EXAM BY PATHOLOGIST: CPT

## 2024-11-06 PROCEDURE — 2500000003 HC RX 250 WO HCPCS

## 2024-11-06 PROCEDURE — 2500000003 HC RX 250 WO HCPCS: Performed by: OBSTETRICS & GYNECOLOGY

## 2024-11-06 PROCEDURE — 6370000000 HC RX 637 (ALT 250 FOR IP): Performed by: ANESTHESIOLOGY

## 2024-11-06 PROCEDURE — 3600000015 HC SURGERY LEVEL 5 ADDTL 15MIN: Performed by: OBSTETRICS & GYNECOLOGY

## 2024-11-06 PROCEDURE — 3700000000 HC ANESTHESIA ATTENDED CARE: Performed by: OBSTETRICS & GYNECOLOGY

## 2024-11-06 PROCEDURE — 2580000003 HC RX 258

## 2024-11-06 PROCEDURE — 3700000001 HC ADD 15 MINUTES (ANESTHESIA): Performed by: OBSTETRICS & GYNECOLOGY

## 2024-11-06 PROCEDURE — 7100000001 HC PACU RECOVERY - ADDTL 15 MIN: Performed by: OBSTETRICS & GYNECOLOGY

## 2024-11-06 PROCEDURE — 2720000010 HC SURG SUPPLY STERILE: Performed by: OBSTETRICS & GYNECOLOGY

## 2024-11-06 PROCEDURE — 6370000000 HC RX 637 (ALT 250 FOR IP): Performed by: OBSTETRICS & GYNECOLOGY

## 2024-11-06 PROCEDURE — 7100000000 HC PACU RECOVERY - FIRST 15 MIN: Performed by: OBSTETRICS & GYNECOLOGY

## 2024-11-06 PROCEDURE — 7100000011 HC PHASE II RECOVERY - ADDTL 15 MIN: Performed by: OBSTETRICS & GYNECOLOGY

## 2024-11-06 PROCEDURE — 87205 SMEAR GRAM STAIN: CPT

## 2024-11-06 PROCEDURE — 2709999900 HC NON-CHARGEABLE SUPPLY: Performed by: OBSTETRICS & GYNECOLOGY

## 2024-11-06 PROCEDURE — 6360000002 HC RX W HCPCS: Performed by: ANESTHESIOLOGY

## 2024-11-06 PROCEDURE — 7100000010 HC PHASE II RECOVERY - FIRST 15 MIN: Performed by: OBSTETRICS & GYNECOLOGY

## 2024-11-06 PROCEDURE — 6360000002 HC RX W HCPCS

## 2024-11-06 PROCEDURE — 87075 CULTR BACTERIA EXCEPT BLOOD: CPT

## 2024-11-06 PROCEDURE — 3600000005 HC SURGERY LEVEL 5 BASE: Performed by: OBSTETRICS & GYNECOLOGY

## 2024-11-06 RX ORDER — SODIUM CHLORIDE 0.9 % (FLUSH) 0.9 %
5-40 SYRINGE (ML) INJECTION EVERY 12 HOURS SCHEDULED
Status: DISCONTINUED | OUTPATIENT
Start: 2024-11-06 | End: 2024-11-06 | Stop reason: HOSPADM

## 2024-11-06 RX ORDER — PROCHLORPERAZINE EDISYLATE 5 MG/ML
5 INJECTION INTRAMUSCULAR; INTRAVENOUS
Status: DISCONTINUED | OUTPATIENT
Start: 2024-11-06 | End: 2024-11-06 | Stop reason: HOSPADM

## 2024-11-06 RX ORDER — ONDANSETRON 2 MG/ML
4 INJECTION INTRAMUSCULAR; INTRAVENOUS
Status: DISCONTINUED | OUTPATIENT
Start: 2024-11-06 | End: 2024-11-06 | Stop reason: HOSPADM

## 2024-11-06 RX ORDER — OXYCODONE HYDROCHLORIDE 5 MG/1
5 TABLET ORAL PRN
Status: COMPLETED | OUTPATIENT
Start: 2024-11-06 | End: 2024-11-06

## 2024-11-06 RX ORDER — ROCURONIUM BROMIDE 10 MG/ML
INJECTION, SOLUTION INTRAVENOUS
Status: DISCONTINUED | OUTPATIENT
Start: 2024-11-06 | End: 2024-11-06 | Stop reason: SDUPTHER

## 2024-11-06 RX ORDER — ONDANSETRON 2 MG/ML
INJECTION INTRAMUSCULAR; INTRAVENOUS
Status: DISCONTINUED | OUTPATIENT
Start: 2024-11-06 | End: 2024-11-06 | Stop reason: SDUPTHER

## 2024-11-06 RX ORDER — HYDROCODONE BITARTRATE AND ACETAMINOPHEN 5; 325 MG/1; MG/1
1 TABLET ORAL EVERY 6 HOURS PRN
Qty: 20 TABLET | Refills: 0 | Status: SHIPPED | OUTPATIENT
Start: 2024-11-06 | End: 2024-11-11

## 2024-11-06 RX ORDER — FENTANYL CITRATE 50 UG/ML
25 INJECTION, SOLUTION INTRAMUSCULAR; INTRAVENOUS EVERY 5 MIN PRN
Status: DISCONTINUED | OUTPATIENT
Start: 2024-11-06 | End: 2024-11-06 | Stop reason: HOSPADM

## 2024-11-06 RX ORDER — ACETAMINOPHEN 325 MG/1
650 TABLET ORAL ONCE
Status: DISCONTINUED | OUTPATIENT
Start: 2024-11-06 | End: 2024-11-06 | Stop reason: HOSPADM

## 2024-11-06 RX ORDER — OXYCODONE HYDROCHLORIDE 5 MG/1
10 TABLET ORAL PRN
Status: COMPLETED | OUTPATIENT
Start: 2024-11-06 | End: 2024-11-06

## 2024-11-06 RX ORDER — KETOROLAC TROMETHAMINE 30 MG/ML
INJECTION, SOLUTION INTRAMUSCULAR; INTRAVENOUS
Status: DISCONTINUED | OUTPATIENT
Start: 2024-11-06 | End: 2024-11-06 | Stop reason: SDUPTHER

## 2024-11-06 RX ORDER — DEXAMETHASONE SODIUM PHOSPHATE 4 MG/ML
INJECTION, SOLUTION INTRA-ARTICULAR; INTRALESIONAL; INTRAMUSCULAR; INTRAVENOUS; SOFT TISSUE
Status: DISCONTINUED | OUTPATIENT
Start: 2024-11-06 | End: 2024-11-06 | Stop reason: SDUPTHER

## 2024-11-06 RX ORDER — CELECOXIB 200 MG/1
200 CAPSULE ORAL ONCE
Status: COMPLETED | OUTPATIENT
Start: 2024-11-06 | End: 2024-11-06

## 2024-11-06 RX ORDER — SODIUM CHLORIDE 0.9 % (FLUSH) 0.9 %
5-40 SYRINGE (ML) INJECTION PRN
Status: DISCONTINUED | OUTPATIENT
Start: 2024-11-06 | End: 2024-11-06 | Stop reason: HOSPADM

## 2024-11-06 RX ORDER — IPRATROPIUM BROMIDE AND ALBUTEROL SULFATE 2.5; .5 MG/3ML; MG/3ML
1 SOLUTION RESPIRATORY (INHALATION)
Status: DISCONTINUED | OUTPATIENT
Start: 2024-11-06 | End: 2024-11-06 | Stop reason: HOSPADM

## 2024-11-06 RX ORDER — SODIUM CHLORIDE 9 MG/ML
INJECTION, SOLUTION INTRAVENOUS PRN
Status: DISCONTINUED | OUTPATIENT
Start: 2024-11-06 | End: 2024-11-06 | Stop reason: HOSPADM

## 2024-11-06 RX ORDER — SODIUM CHLORIDE 9 MG/ML
INJECTION, SOLUTION INTRAVENOUS CONTINUOUS
Status: DISCONTINUED | OUTPATIENT
Start: 2024-11-06 | End: 2024-11-06 | Stop reason: HOSPADM

## 2024-11-06 RX ORDER — SODIUM CHLORIDE, SODIUM LACTATE, POTASSIUM CHLORIDE, CALCIUM CHLORIDE 600; 310; 30; 20 MG/100ML; MG/100ML; MG/100ML; MG/100ML
INJECTION, SOLUTION INTRAVENOUS CONTINUOUS
Status: DISCONTINUED | OUTPATIENT
Start: 2024-11-06 | End: 2024-11-06 | Stop reason: HOSPADM

## 2024-11-06 RX ORDER — BUPIVACAINE HYDROCHLORIDE AND EPINEPHRINE 5; 5 MG/ML; UG/ML
INJECTION, SOLUTION EPIDURAL; INTRACAUDAL; PERINEURAL
Status: COMPLETED | OUTPATIENT
Start: 2024-11-06 | End: 2024-11-06

## 2024-11-06 RX ORDER — NALOXONE HYDROCHLORIDE 0.4 MG/ML
INJECTION, SOLUTION INTRAMUSCULAR; INTRAVENOUS; SUBCUTANEOUS PRN
Status: DISCONTINUED | OUTPATIENT
Start: 2024-11-06 | End: 2024-11-06 | Stop reason: HOSPADM

## 2024-11-06 RX ORDER — LIDOCAINE HYDROCHLORIDE 20 MG/ML
INJECTION, SOLUTION EPIDURAL; INFILTRATION; INTRACAUDAL; PERINEURAL
Status: DISCONTINUED | OUTPATIENT
Start: 2024-11-06 | End: 2024-11-06 | Stop reason: SDUPTHER

## 2024-11-06 RX ORDER — PROPOFOL 10 MG/ML
INJECTION, EMULSION INTRAVENOUS
Status: DISCONTINUED | OUTPATIENT
Start: 2024-11-06 | End: 2024-11-06 | Stop reason: SDUPTHER

## 2024-11-06 RX ORDER — KETOROLAC TROMETHAMINE 30 MG/ML
30 INJECTION, SOLUTION INTRAMUSCULAR; INTRAVENOUS ONCE
Status: DISCONTINUED | OUTPATIENT
Start: 2024-11-06 | End: 2024-11-06 | Stop reason: HOSPADM

## 2024-11-06 RX ORDER — METHOCARBAMOL 100 MG/ML
500 INJECTION, SOLUTION INTRAMUSCULAR; INTRAVENOUS EVERY 8 HOURS
Status: DISCONTINUED | OUTPATIENT
Start: 2024-11-06 | End: 2024-11-06 | Stop reason: HOSPADM

## 2024-11-06 RX ORDER — ACETAMINOPHEN 500 MG
1000 TABLET ORAL ONCE
Status: COMPLETED | OUTPATIENT
Start: 2024-11-06 | End: 2024-11-06

## 2024-11-06 RX ORDER — FENTANYL CITRATE 50 UG/ML
INJECTION, SOLUTION INTRAMUSCULAR; INTRAVENOUS
Status: DISCONTINUED | OUTPATIENT
Start: 2024-11-06 | End: 2024-11-06 | Stop reason: SDUPTHER

## 2024-11-06 RX ORDER — IBUPROFEN 800 MG/1
800 TABLET, FILM COATED ORAL EVERY 8 HOURS
Qty: 60 TABLET | Refills: 1 | Status: SHIPPED | OUTPATIENT
Start: 2024-11-06

## 2024-11-06 RX ADMIN — KETOROLAC TROMETHAMINE 15 MG: 30 INJECTION, SOLUTION INTRAMUSCULAR; INTRAVENOUS at 14:12

## 2024-11-06 RX ADMIN — HYDROMORPHONE HYDROCHLORIDE 0.25 MG: 1 INJECTION, SOLUTION INTRAMUSCULAR; INTRAVENOUS; SUBCUTANEOUS at 14:58

## 2024-11-06 RX ADMIN — DEXMEDETOMIDINE 4 MCG: 100 INJECTION, SOLUTION INTRAVENOUS at 13:52

## 2024-11-06 RX ADMIN — FENTANYL CITRATE 25 MCG: 50 INJECTION, SOLUTION INTRAMUSCULAR; INTRAVENOUS at 14:50

## 2024-11-06 RX ADMIN — ACETAMINOPHEN 1000 MG: 500 TABLET ORAL at 12:24

## 2024-11-06 RX ADMIN — METHOCARBAMOL 500 MG: 100 INJECTION INTRAMUSCULAR; INTRAVENOUS at 14:52

## 2024-11-06 RX ADMIN — FENTANYL CITRATE 25 MCG: 50 INJECTION, SOLUTION INTRAMUSCULAR; INTRAVENOUS at 13:52

## 2024-11-06 RX ADMIN — FENTANYL CITRATE 50 MCG: 50 INJECTION, SOLUTION INTRAMUSCULAR; INTRAVENOUS at 13:32

## 2024-11-06 RX ADMIN — HYDROMORPHONE HYDROCHLORIDE 0.25 MG: 1 INJECTION, SOLUTION INTRAMUSCULAR; INTRAVENOUS; SUBCUTANEOUS at 15:03

## 2024-11-06 RX ADMIN — SUGAMMADEX 200 MG: 100 INJECTION, SOLUTION INTRAVENOUS at 14:20

## 2024-11-06 RX ADMIN — ROCURONIUM BROMIDE 50 MG: 10 INJECTION, SOLUTION INTRAVENOUS at 13:32

## 2024-11-06 RX ADMIN — ONDANSETRON 4 MG: 2 INJECTION INTRAMUSCULAR; INTRAVENOUS at 13:45

## 2024-11-06 RX ADMIN — LIDOCAINE HYDROCHLORIDE 100 MG: 20 INJECTION, SOLUTION EPIDURAL; INFILTRATION; INTRACAUDAL; PERINEURAL at 13:32

## 2024-11-06 RX ADMIN — DEXAMETHASONE SODIUM PHOSPHATE 8 MG: 4 INJECTION, SOLUTION INTRAMUSCULAR; INTRAVENOUS at 13:45

## 2024-11-06 RX ADMIN — OXYCODONE HYDROCHLORIDE 10 MG: 5 TABLET ORAL at 16:13

## 2024-11-06 RX ADMIN — PROPOFOL 150 MG: 10 INJECTION, EMULSION INTRAVENOUS at 13:32

## 2024-11-06 RX ADMIN — FENTANYL CITRATE 25 MCG: 50 INJECTION, SOLUTION INTRAMUSCULAR; INTRAVENOUS at 14:15

## 2024-11-06 RX ADMIN — FENTANYL CITRATE 25 MCG: 50 INJECTION, SOLUTION INTRAMUSCULAR; INTRAVENOUS at 15:08

## 2024-11-06 RX ADMIN — CELECOXIB 200 MG: 200 CAPSULE ORAL at 12:24

## 2024-11-06 RX ADMIN — DEXMEDETOMIDINE 4 MCG: 100 INJECTION, SOLUTION INTRAVENOUS at 13:42

## 2024-11-06 ASSESSMENT — PAIN SCALES - GENERAL
PAINLEVEL_OUTOF10: 6
PAINLEVEL_OUTOF10: 8
PAINLEVEL_OUTOF10: 6
PAINLEVEL_OUTOF10: 3
PAINLEVEL_OUTOF10: 8
PAINLEVEL_OUTOF10: 0

## 2024-11-06 ASSESSMENT — PAIN DESCRIPTION - FREQUENCY
FREQUENCY: CONTINUOUS

## 2024-11-06 ASSESSMENT — PAIN DESCRIPTION - ORIENTATION
ORIENTATION: LOWER

## 2024-11-06 ASSESSMENT — PAIN DESCRIPTION - DESCRIPTORS
DESCRIPTORS: ACHING;CRAMPING
DESCRIPTORS: DISCOMFORT;CRAMPING;ACHING
DESCRIPTORS: CRAMPING;DISCOMFORT
DESCRIPTORS: CRAMPING;ACHING;DISCOMFORT
DESCRIPTORS: CRAMPING;DISCOMFORT
DESCRIPTORS: CRAMPING;DISCOMFORT;ACHING
DESCRIPTORS: CRAMPING;ACHING;DISCOMFORT

## 2024-11-06 ASSESSMENT — PAIN DESCRIPTION - LOCATION
LOCATION: ABDOMEN

## 2024-11-06 ASSESSMENT — PAIN DESCRIPTION - ONSET
ONSET: ON-GOING

## 2024-11-06 ASSESSMENT — PAIN DESCRIPTION - PAIN TYPE
TYPE: SURGICAL PAIN

## 2024-11-06 ASSESSMENT — PAIN - FUNCTIONAL ASSESSMENT
PAIN_FUNCTIONAL_ASSESSMENT: 0-10
PAIN_FUNCTIONAL_ASSESSMENT: ACTIVITIES ARE NOT PREVENTED
PAIN_FUNCTIONAL_ASSESSMENT: PREVENTS OR INTERFERES SOME ACTIVE ACTIVITIES AND ADLS
PAIN_FUNCTIONAL_ASSESSMENT: 0-10
PAIN_FUNCTIONAL_ASSESSMENT: PREVENTS OR INTERFERES SOME ACTIVE ACTIVITIES AND ADLS
PAIN_FUNCTIONAL_ASSESSMENT: PREVENTS OR INTERFERES SOME ACTIVE ACTIVITIES AND ADLS
PAIN_FUNCTIONAL_ASSESSMENT: ACTIVITIES ARE NOT PREVENTED
PAIN_FUNCTIONAL_ASSESSMENT: ACTIVITIES ARE NOT PREVENTED
PAIN_FUNCTIONAL_ASSESSMENT: PREVENTS OR INTERFERES SOME ACTIVE ACTIVITIES AND ADLS

## 2024-11-06 ASSESSMENT — PAIN SCALES - WONG BAKER: WONGBAKER_NUMERICALRESPONSE: NO HURT

## 2024-11-06 NOTE — ANESTHESIA POSTPROCEDURE EVALUATION
Department of Anesthesiology  Postprocedure Note    Patient: Marilou Carrera  MRN: [id number]  YOB: 1985  Date of evaluation: 11/6/2024    Procedure Summary       Date: 11/06/24 Room / Location: [address]    Anesthesia Start: 1326 Anesthesia Stop: 1440    Procedures:       DILATATION AND CURETTAGE HYSTEROSCOPY NOVASURE ABLATION (Vagina )      LAPAROSCOPY EXPLORATORY WITH RIGHT OOPHORECTOMY AND LEFT OVARIAN CYSTECTOMY (Abdomen) Diagnosis:       Left ovarian cyst      Menometrorrhagia      Microcytic anemia      (Left ovarian cyst [N83.202])      (Menometrorrhagia [N92.1])      (Microcytic anemia [D50.9])    Surgeons: Blake De Luna MD Responsible Provider: Ashtyn Casillas MD    Anesthesia Type: General ASA Status: 2            Anesthesia Type: General    Veronica Phase I: Veronica Score: 10    Veronica Phase II: Veronica Score: 9    Anesthesia Post Evaluation    Patient location during evaluation: PACU  Patient participation: complete - patient participated  Level of consciousness: sleepy but conscious  Pain score: 3  Airway patency: patent  Nausea & Vomiting: no nausea and no vomiting  Cardiovascular status: blood pressure returned to baseline  Respiratory status: nasal cannula  Hydration status: euvolemic  Multimodal analgesia pain management approach  Pain management: adequate    No notable events documented.

## 2024-11-06 NOTE — OP NOTE
Department of Gynecology  Operative Report      Pre-operative Diagnosis: Menorrhagia, persistent right ovarian cyst since 2017    Post-operative Diagnosis: Same, left ovarian cyst    Procedure: Laparoscopy with right salpingo-oophorectomy, left ovarian cystectomy, hysteroscopy, dilation and curettage, NovaSure endometrial ablation    Surgeon: Dr. Blake De Luna     Assistant(s): None    Anesthesia:  General Endotracheal Anesthesia    Findings: Normal upper abdominal survey.  Absent fallopian tubes bilaterally.  The right ovary is entirely cystic.  The left ovary had a 4 cm cyst but did also have normal ovarian cortex.  There is no evidence of endometriosis.  There is no evidence of pelvic adhesions.  Hysteroscopy revealed a normal endocervical and a normal endometrial cavity.  There was no evidence of a submucosal myoma or endometrial polyp.   Cervical Length: 3.5 cm      sounding Length: 8 cm    cavity Length: 4.5 cm   cavity Width: 2.5 cm       power:   [address] Time: 2 minutes    Estimated blood loss: 1 cc    Blood Transfusion?:  None     Drains: None    Specimens: 1.  Right ovary 2.  Left ovarian cyst 3.  Endometrial curettings    Complications: None    Condition: Stable    Narrative operative report:  After informed consent was obtained, patient was brought to the operating suite where general endotracheal anesthesia was attained by the anesthesia service without complication.  Patient was then prepped and draped in the normal sterile fashion after she was placed in the dorsal lithotomy position.  Bladder was drained via transurethral in and out catheterization.  A ZUMI uterine manipulator was placed in the endometrial cavity and the balloon inflated.  Gloves were changed and attention was then turned to the abdomen.  All incisions were infiltrated with 0.5% Marcaine with epinephrine prior to incision being made.  A 5 mm vertical umbilical skin incision was made.  A 5 mm trocar and sleeve were introduced

## 2024-11-06 NOTE — PROGRESS NOTES
1433: Patient arrived in PACU from the OR s/p D&C hysteroscopy with novasure ablation. Received report from Robert SCHROEDER and Billie CURRAN. Patient attached to monitor and all alarms are on.   1435: Patient has x3 lap sites on the abdomen, well approximated with surgical glue.  1447: Patient awake rating pain a 6 out of 10.   1450: Patient c/o pain see mar.  1452: robaxin given for pain see mar.   1455: Patient awake and alertx4, following commands, 94% on room air. VSS. Patient tolerating ice chips at this time.   1458: Patient c/o pain see mar.  1503: Patient re-medicated for pain see mar.  1508: Patient c/o pain see mar.   1520:Patient awake and alertx4, following commands. VSS. Patient denies any nausea and states that her pain is improving. Patient has x3 lap sites that are well approximated with surgical glue.   1522: RN transported patient to room 21 on [facility], bedside handoff report given to Cynthia SCHROEDER. Call light in reach. Patient's mom bedside.    Lvm for patient to call the office back to schedule a hospital f/u

## 2024-11-06 NOTE — H&P
Marilou Carrera  1985  Primary Care Physician: Linh Weinberg PA    HISTORY & PHYSICAL        HOSPITAL: [facility]    HPI: Marilou Carrera is a 39 y.o. female  with menometrorrhagia and a persistent ovarian cyst (right since prior to ).   Current us shows bilateral ovarian cysts.      No diagnosis found.   Patient Active Problem List   Diagnosis    Acute appendicitis    Dehiscence of surgical wound    Chronic hypertension complicating or reason for care during pregnancy, second trimester     death in prior pregnancy, currently pregnant, second trimester    Labor and delivery indication for care or intervention    Obesity affecting pregnancy in third trimester, antepartum    History of stillbirth in currently pregnant patient, third trimester    GDM, class A1    Gestational diabetes mellitus (GDM), delivered    Elderly multigravida delivered    Single liveborn, born in hospital, delivered by vaginal delivery    39 weeks gestation of pregnancy    Encounter for elective induction of labor    Preeclampsia in postpartum period    Pre-eclampsia in third trimester    Acute pyelonephritis    Nephrolithiasis    Hyponatremia    SIRS (systemic inflammatory response syndrome) (HCC)    Tachycardia    Left ovarian cyst    Menometrorrhagia    Microcytic anemia       OB History    Para Term  AB Living   6 5 4 1 1 4   SAB IAB Ectopic Molar Multiple Live Births   1 0 0 0 0 5      # Outcome Date GA Lbr Ari/2nd Weight Sex Type Anes PTL Lv   6 Term 22 39w2d 01:33 / 00:09 4.062 kg (8 lb 15.3 oz) M Vag-Spont EPI N JAKOB      Name: ANGELITO CARRERABABY ELVIS MARIO      Apgar1: 8  Apgar5: 9   5 SAB 21     OTHER      4 Term 18 39w1d  4.245 kg (9 lb 5.7 oz) M  EPI  JAKOB      Name: ANGELITO CARRERABABY BOY      Apgar1: 6  Apgar5: 9   3 Term 14 39w0d  4.054 kg (8 lb 15 oz) F Vag-Spont EPI N JAKOB   2 Term 02/05/10 39w0d  4.026 kg (8 lb 14 oz) M Vag-Spont EPI N

## 2024-11-06 NOTE — PROGRESS NOTES
Outpatient Pharmacy Progress Note for Meds-to-Beds    Total number of Prescriptions Filled: 2    Additional Documentation:  {Blank single:69945::\"Medication(s) were delivered to the patient's room prior to discharge\",\"Patient picked-up the medication(s) in the OP Pharmacy\",\"Patient's family member picked-up the medication(s) in the OP Pharmacy\",\"Medications given to nurse *** to provide to patient\",\"Medications picked-up in the OP Pharmacy by nurse *** to provide to patient\",\"Medication(s) delivered to the patient's room prior to discharge by a volunteer\"}      Thank you for letting us [place] [address]    Phone: [phone]    Fax: [phone]

## 2024-11-06 NOTE — DISCHARGE INSTRUCTIONS
answered (i.e. what more you can do physically; such as driving a car, exercise).     Returning to work:   This depends on the type of surgery you had and how quickly you recover. Your doctor will determine when it’s appropriate for you to return to work.   Leave surgical dressing/bandages on for 24 hours.   You may shower or bathe in the morning.   You may experience some shoulder pain (especially on the right) and chest pain. This is normal and caused by the gas injected into the abdomen. The gas is inserted to create a working and viewing space inside the abdomen during surgery.   No intercourse, douching or tampons for 7 days.   Expect some vaginal bleeding   Take pain medication as directed.   You may resume your normal daily activities as tolerated.   Resume your normal diet. Try to avoid constipation by eating high fiber foods or adding a fiber supplement such as Metamucil, Fibercon, or Benefiber; especially while taking a narcotic pain medication.

## 2024-11-06 NOTE — PROGRESS NOTES
1527: Patient returns to [facility] following procedure, bedside report received from Janet SCHROEDER, VSS, family at bedside, call light in reach, beverage of choice provided.   1610: patient complaining of inability to move legs. Patient states she can feel when I touch her legs but they feel numb and she cannot move them. Explained to patient this may be related to positioning in the operating room and should quickly resolve. Dr south notified of patients complaints.   1620: discharge instructions reviewed with patient and mother, christine, both verbalized understanding. Patient stood at bedside with nurse assist x2, patient tolerated well.   1630: patient dressing, call light in reach, mom at bedside to assist. IV removed.

## 2024-11-08 LAB — SURGICAL PATHOLOGY REPORT: NORMAL

## 2024-11-09 LAB
MICROORGANISM SPEC CULT: NORMAL
MICROORGANISM/AGENT SPEC: NORMAL
SERVICE CMNT-IMP: NORMAL
SPECIMEN DESCRIPTION: NORMAL

## 2024-12-31 ENCOUNTER — OFFICE VISIT (OUTPATIENT)
Dept: OBGYN | Age: 39
End: 2024-12-31
Payer: COMMERCIAL

## 2024-12-31 VITALS
SYSTOLIC BLOOD PRESSURE: 147 MMHG | BODY MASS INDEX: 31.01 KG/M2 | HEART RATE: 93 BPM | WEIGHT: 210 LBS | DIASTOLIC BLOOD PRESSURE: 99 MMHG

## 2024-12-31 DIAGNOSIS — N92.1 MENOMETRORRHAGIA: Primary | ICD-10-CM

## 2024-12-31 DIAGNOSIS — D50.0 IRON DEFICIENCY ANEMIA DUE TO CHRONIC BLOOD LOSS: ICD-10-CM

## 2024-12-31 PROCEDURE — G8484 FLU IMMUNIZE NO ADMIN: HCPCS | Performed by: OBSTETRICS & GYNECOLOGY

## 2024-12-31 PROCEDURE — G8417 CALC BMI ABV UP PARAM F/U: HCPCS | Performed by: OBSTETRICS & GYNECOLOGY

## 2024-12-31 PROCEDURE — 99213 OFFICE O/P EST LOW 20 MIN: CPT | Performed by: OBSTETRICS & GYNECOLOGY

## 2024-12-31 PROCEDURE — 3080F DIAST BP >= 90 MM HG: CPT | Performed by: OBSTETRICS & GYNECOLOGY

## 2024-12-31 PROCEDURE — 3077F SYST BP >= 140 MM HG: CPT | Performed by: OBSTETRICS & GYNECOLOGY

## 2024-12-31 PROCEDURE — G8427 DOCREV CUR MEDS BY ELIG CLIN: HCPCS | Performed by: OBSTETRICS & GYNECOLOGY

## 2024-12-31 PROCEDURE — 36415 COLL VENOUS BLD VENIPUNCTURE: CPT | Performed by: OBSTETRICS & GYNECOLOGY

## 2024-12-31 PROCEDURE — 1036F TOBACCO NON-USER: CPT | Performed by: OBSTETRICS & GYNECOLOGY

## 2024-12-31 NOTE — PROGRESS NOTES
12/31/24    Marilou Carrera  1985    Chief Complaint   Patient presents with    Post-Op Check     11/6/24 pt had DILATATION AND CURETTAGE HYSTEROSCOPY NOVASURE ABLATION  LAPAROSCOPY EXPLORATORY WITH RIGHT OOPHORECTOMY AND LEFT OVARIAN CYSTECTOMY. Pt c/o daily bleeding since procedure. Pt uses tampon. Denies odor, denies discharge.        Marilou Carrera is a 39 y.o. female who presents today for evaluation of persistent AUB after hysteroscopy, D&C, novasure endometrial ablation.  Patient quite upset by the persistent AUB.     Past Medical History:   Diagnosis Date    Acute appendicitis 04/11/2012    Chemical pregnancy     Hypertension     Irregular menses     Kidney infection     Kidney stones 07/08/2022    Obesity     Prior pregnancy with fetal demise and current pregnancy 02/2007    36Wks       Past Surgical History:   Procedure Laterality Date    BLADDER SURGERY Right 07/08/2022    CYSTOSCOPY RIGHT  RETROGRADE PYELOGRAM STENT INSERTION performed by Shara Mcghee MD at [facility] OR    CHOLECYSTECTOMY      DILATION AND CURETTAGE OF UTERUS N/A 11/6/2024    DILATATION AND CURETTAGE HYSTEROSCOPY NOVASURE ABLATION performed by Blake De Luna MD at [facility] OR    LAPAROSCOPIC APPENDECTOMY  04/10/2012    LAPAROSCOPY N/A 11/6/2024    LAPAROSCOPY EXPLORATORY WITH RIGHT OOPHORECTOMY AND LEFT OVARIAN CYSTECTOMY performed by Blake De Luna MD at [facility] OR    LITHOTRIPSY Right 08/08/2022    RIGHT CYSTOSCOPY URETEROSCOPY RETROGRADE PYELOGRAM STONE MANIPULATION WITH HOLIUM LASER LITHOTRIPSY POSSIBLE STENT PLACEMENT performed by Bean Crooks MD at [facility] OR    OTHER SURGICAL HISTORY  07/2010    lump removed from right axilla    OTHER SURGICAL HISTORY  09/2010    lump removed from groin area    OTHER SURGICAL HISTORY Right 04/11/2013    Excision of lesion to R axilla    OTHER SURGICAL HISTORY Right 04/29/2013    Closure right axillary wound    TUBAL LIGATION         Social History

## 2024-12-31 NOTE — PROGRESS NOTES
Will start prior auth process for [id number] [facility] OP once labs ordered on 12/31/24 are completed and charted.  Will need those for prior auth process.

## 2025-01-01 LAB
DEPRECATED RDW RBC AUTO: 17.7 % (ref 12.4–15.4)
FERRITIN SERPL IA-MCNC: 34.8 NG/ML (ref 15–150)
HCT VFR BLD AUTO: 42.3 % (ref 36–48)
HGB BLD-MCNC: 13.7 G/DL (ref 12–16)
IRON SATN MFR SERPL: 19 % (ref 15–50)
IRON SERPL-MCNC: 92 UG/DL (ref 37–145)
MCH RBC QN AUTO: 23.8 PG (ref 26–34)
MCHC RBC AUTO-ENTMCNC: 32.3 G/DL (ref 31–36)
MCV RBC AUTO: 73.6 FL (ref 80–100)
PLATELET # BLD AUTO: 125 K/UL (ref 135–450)
PMV BLD AUTO: 9.1 FL (ref 5–10.5)
RBC # BLD AUTO: 5.75 M/UL (ref 4–5.2)
TIBC SERPL-MCNC: 486 UG/DL (ref 260–445)
WBC # BLD AUTO: 5.1 K/UL (ref 4–11)

## 2025-01-02 NOTE — PROGRESS NOTES
Uploaded documentation to [facility] for [phone] [facility] OP  Pending auth # [id number]  1/8/25-5/31/25

## 2025-01-14 ENCOUNTER — PREP FOR PROCEDURE (OUTPATIENT)
Dept: OBGYN | Age: 40
End: 2025-01-14

## 2025-01-14 DIAGNOSIS — D50.0 IRON DEFICIENCY ANEMIA DUE TO CHRONIC BLOOD LOSS: ICD-10-CM

## 2025-01-15 NOTE — PROGRESS NOTES
LM with my call-back # concerning  surgery @ [facility] on 1/22/25.  Please call the PAT Nurse for a phone assessment and surgery instructions.

## 2025-01-16 ENCOUNTER — OFFICE VISIT (OUTPATIENT)
Dept: OBGYN | Age: 40
End: 2025-01-16
Payer: COMMERCIAL

## 2025-01-16 VITALS
SYSTOLIC BLOOD PRESSURE: 149 MMHG | DIASTOLIC BLOOD PRESSURE: 102 MMHG | WEIGHT: 215.6 LBS | HEART RATE: 89 BPM | BODY MASS INDEX: 31.84 KG/M2

## 2025-01-16 DIAGNOSIS — D50.0 IRON DEFICIENCY ANEMIA DUE TO CHRONIC BLOOD LOSS: ICD-10-CM

## 2025-01-16 DIAGNOSIS — N92.1 MENOMETRORRHAGIA: Primary | ICD-10-CM

## 2025-01-16 PROCEDURE — 3077F SYST BP >= 140 MM HG: CPT | Performed by: OBSTETRICS & GYNECOLOGY

## 2025-01-16 PROCEDURE — G8427 DOCREV CUR MEDS BY ELIG CLIN: HCPCS | Performed by: OBSTETRICS & GYNECOLOGY

## 2025-01-16 PROCEDURE — 99213 OFFICE O/P EST LOW 20 MIN: CPT | Performed by: OBSTETRICS & GYNECOLOGY

## 2025-01-16 PROCEDURE — 3080F DIAST BP >= 90 MM HG: CPT | Performed by: OBSTETRICS & GYNECOLOGY

## 2025-01-16 PROCEDURE — 1036F TOBACCO NON-USER: CPT | Performed by: OBSTETRICS & GYNECOLOGY

## 2025-01-16 PROCEDURE — G8417 CALC BMI ABV UP PARAM F/U: HCPCS | Performed by: OBSTETRICS & GYNECOLOGY

## 2025-01-16 NOTE — PROGRESS NOTES
Surgery @ [facility] on 1/22/25 you will be called 1/21/25 with times    NOTHING TO EAT OR DRINK AFTER MIDNIGHT DAY OF SURGERY    1. Enter thru the hospital main entrance on day of surgery, check in at the Information Desk. If you arrive prior to 6:00am, enter thru the ER entrance.    2. Follow the directions as prescribed by the doctor for your procedure and medications.         Morning of surgery take: No Medications         Stop vitamins, supplements and NSAIDS:  NOW    3. Check with your Doctor regarding stopping blood thinners and follow their instructions.    4. Do not smoke, vape or use chewing tobacco morning of surgery. Do not drink any alcoholic beverages 24 hours prior to surgery.       This includes NA Beer. No street drugs 7 days prior to surgery.    5. If you have dentures, contacts of glasses they will be removed before going to the OR; please bring a case.    6. Please bring picture ID, insurance card, paperwork from the doctor’s office (H & P, Consent, & card for implantable devices).    7. Take a shower with an antibacterial soap the night before surgery and the morning of surgery. Do not put anything on your skin      After your morning shower.    8. You will need a responsible adult to drive you home and check on you after surgery.

## 2025-01-16 NOTE — PROGRESS NOTES
Collected Updated Procedure    12/31/2024 1022 01/01/2025 0001 Ferritin [[id number]]   Blood    Component Value Units   Ferritin 34.8 ng/mL          12/31/2024 1022 01/01/2025 0001 Iron and TIBC [[id number]]   (Abnormal)   Blood    Component Value Units   Iron 92 ug/dL   TIBC 486 High  ug/dL   Iron % Saturation 19 %          12/31/2024 1022 01/01/2025 0001 CBC [[id number]]   (Abnormal)   Blood    Component Value Units   WBC 5.1 K/uL   RBC 5.75 High  M/uL   Hemoglobin 13.7 g/dL   Hematocrit 42.3 %   MCV 73.6 Low  fL   MCH 23.8 Low  pg   MCHC 32.3 g/dL   RDW 17.7 High  %   Platelets 125 Low  K/uL   MPV 9.1 fL          11/06/2024 1414  Surgical Pathology [[id number]]   Tissue    Component Value   No component results          11/06/2024 1409 11/11/2024 1024 Culture, Body Fluid (with Gram Stain) [[id number]]   Body Fluid from Cul de sac    Component Value   Specimen Description .CUL DE SAC fluid   Special Requests Site: Body Fluid   Culture No growth 60 to 72 hours          11/06/2024 1401 11/08/2024 1231 SURGICAL PATHOLOGY REPORT [[id number]] Component Value   Surgical Pathology Report Path Number: [id number]    -- Diagnosis --  A.  Ovary, left cyst, excision:  -  Benign follicular cyst.  -  Negative for malignancy.        B.  Ovary, right, oophorectomy:  -  Benign cortical cysts and endosalpingiosis.  -  Negative for malignancy.    C.  Endometrium, curettage:  -  Proliferative phase endometrium with focal disordered proliferative  pattern features noted.    -  Focal benign squamous and endocervical mucosa are seen.    -  Negative for dysplasia, hyperplasia, and malignancy.          Rojelio Anderson MD  **Electronically Signed Out**        [facility]/11/8/2024     Clinical Information  Let ovarian cyst(N83.202), menometrorrhagia(N92.1), microcytic  anemia(D50.9)        Source of Specimen  A: Left ovarian cyst  B: Right ovary  C: ENDOMETRIAL CURETTINGS      Gross Description  A.  The specimen is received in formalin labeled

## 2025-01-20 ENCOUNTER — ANESTHESIA EVENT (OUTPATIENT)
Dept: OPERATING ROOM | Age: 40
End: 2025-01-20
Payer: COMMERCIAL

## 2025-01-20 NOTE — ANESTHESIA PRE PROCEDURE
Sharda Lara MD      Department of Anesthesiology  Preprocedure Note       Name:  Marilou Carrera   Age:  39 y.o.  :  1985                                          MRN:  [id number]         Date:  2025      Surgeon: Surgeon(s):  Blake De Luna MD    Procedure: Procedure(s):  HYSTERECTOMY VAGINAL ROBOTIC ASSISTED  CYSTOSCOPY    Medications prior to admission:   Prior to Admission medications    Medication Sig Start Date End Date Taking? Authorizing Provider   ibuprofen (ADVIL;MOTRIN) 800 MG tablet Take 1 tablet by mouth in the morning and 1 tablet at noon and 1 tablet in the evening. 24   Blake De Luna MD   losartan-hydroCHLOROthiazide (HYZAAR) 50-12.5 MG per tablet Take 1 tablet by mouth daily    Provider, MD Karla   acetaminophen (TYLENOL) 500 MG tablet Take 2 tablets by mouth every 6 hours as needed for Pain    ProviderKarla MD   blood glucose monitor strips Test 4 times a day & as needed for symptoms of irregular blood glucose. Dispense sufficient amount for indicated testing frequency plus additional to accommodate PRN testing needs.  Patient not taking: Reported on 2022 4/15/22 7/27/22  Blake De Luna MD       Current medications:    No current facility-administered medications for this encounter.     Current Outpatient Medications   Medication Sig Dispense Refill   • ibuprofen (ADVIL;MOTRIN) 800 MG tablet Take 1 tablet by mouth in the morning and 1 tablet at noon and 1 tablet in the evening. 60 tablet 1   • losartan-hydroCHLOROthiazide (HYZAAR) 50-12.5 MG per tablet Take 1 tablet by mouth daily     • acetaminophen (TYLENOL) 500 MG tablet Take 2 tablets by mouth every 6 hours as needed for Pain         Allergies:  No Known Allergies    Problem List:    Patient Active Problem List   Diagnosis Code   • Acute appendicitis K35.80   • Dehiscence of surgical wound T81.31XA   • Chronic hypertension complicating or reason for care during pregnancy, second trimester

## 2025-01-21 RX ORDER — SODIUM CHLORIDE 0.9 % (FLUSH) 0.9 %
5-40 SYRINGE (ML) INJECTION PRN
Status: CANCELLED | OUTPATIENT
Start: 2025-01-21

## 2025-01-21 NOTE — PROGRESS NOTES
Notified patient surgery @ [facility] on 1/22/25 @ 0830, arrival 0630. NPO status and morning medications reviewed. Understanding verbalized.

## 2025-01-22 ENCOUNTER — HOSPITAL ENCOUNTER (OUTPATIENT)
Age: 40
Setting detail: OUTPATIENT SURGERY
Discharge: HOME | End: 2025-01-22
Attending: OBSTETRICS & GYNECOLOGY | Admitting: OBSTETRICS & GYNECOLOGY
Payer: COMMERCIAL

## 2025-01-22 ENCOUNTER — ANESTHESIA (OUTPATIENT)
Dept: OPERATING ROOM | Age: 40
End: 2025-01-22
Payer: COMMERCIAL

## 2025-01-22 VITALS
WEIGHT: 212 LBS | BODY MASS INDEX: 31.4 KG/M2 | TEMPERATURE: 97.7 F | DIASTOLIC BLOOD PRESSURE: 100 MMHG | OXYGEN SATURATION: 98 % | SYSTOLIC BLOOD PRESSURE: 163 MMHG | HEART RATE: 86 BPM | HEIGHT: 69 IN | RESPIRATION RATE: 18 BRPM

## 2025-01-22 DIAGNOSIS — N92.1 MENOMETRORRHAGIA: ICD-10-CM

## 2025-01-22 DIAGNOSIS — D50.0 IRON DEFICIENCY ANEMIA DUE TO CHRONIC BLOOD LOSS: ICD-10-CM

## 2025-01-22 DIAGNOSIS — G89.18 POST-OP PAIN: Primary | ICD-10-CM

## 2025-01-22 LAB
ERYTHROCYTE [DISTWIDTH] IN BLOOD BY AUTOMATED COUNT: 16.9 % (ref 11.7–14.9)
HCG, PREGNANCY URINE (POC): NEGATIVE
HCT VFR BLD AUTO: 37.4 % (ref 37–47)
HGB BLD-MCNC: 12.2 G/DL (ref 12.5–16)
MCH RBC QN AUTO: 24.4 PG (ref 27–31)
MCHC RBC AUTO-ENTMCNC: 32.6 G/DL (ref 32–36)
MCV RBC AUTO: 74.7 FL (ref 78–100)
PLATELET # BLD AUTO: 166 K/UL (ref 140–440)
PMV BLD AUTO: 9.8 FL (ref 7.5–11.1)
RBC # BLD AUTO: 5.01 M/UL (ref 4.2–5.4)
WBC OTHER # BLD: 4.8 K/UL (ref 4–10.5)

## 2025-01-22 PROCEDURE — 88307 TISSUE EXAM BY PATHOLOGIST: CPT

## 2025-01-22 PROCEDURE — 7100000001 HC PACU RECOVERY - ADDTL 15 MIN: Performed by: OBSTETRICS & GYNECOLOGY

## 2025-01-22 PROCEDURE — 3700000000 HC ANESTHESIA ATTENDED CARE: Performed by: OBSTETRICS & GYNECOLOGY

## 2025-01-22 PROCEDURE — 3600000009 HC SURGERY ROBOT BASE: Performed by: OBSTETRICS & GYNECOLOGY

## 2025-01-22 PROCEDURE — 81025 URINE PREGNANCY TEST: CPT

## 2025-01-22 PROCEDURE — 2500000003 HC RX 250 WO HCPCS: Performed by: OBSTETRICS & GYNECOLOGY

## 2025-01-22 PROCEDURE — 2500000003 HC RX 250 WO HCPCS: Performed by: NURSE ANESTHETIST, CERTIFIED REGISTERED

## 2025-01-22 PROCEDURE — 6360000002 HC RX W HCPCS: Performed by: OBSTETRICS & GYNECOLOGY

## 2025-01-22 PROCEDURE — 7100000011 HC PHASE II RECOVERY - ADDTL 15 MIN: Performed by: OBSTETRICS & GYNECOLOGY

## 2025-01-22 PROCEDURE — 2720000010 HC SURG SUPPLY STERILE: Performed by: OBSTETRICS & GYNECOLOGY

## 2025-01-22 PROCEDURE — 3700000001 HC ADD 15 MINUTES (ANESTHESIA): Performed by: OBSTETRICS & GYNECOLOGY

## 2025-01-22 PROCEDURE — 6360000002 HC RX W HCPCS: Performed by: ANESTHESIOLOGY

## 2025-01-22 PROCEDURE — 6370000000 HC RX 637 (ALT 250 FOR IP): Performed by: OBSTETRICS & GYNECOLOGY

## 2025-01-22 PROCEDURE — 3600000019 HC SURGERY ROBOT ADDTL 15MIN: Performed by: OBSTETRICS & GYNECOLOGY

## 2025-01-22 PROCEDURE — 85027 COMPLETE CBC AUTOMATED: CPT

## 2025-01-22 PROCEDURE — 2709999900 HC NON-CHARGEABLE SUPPLY: Performed by: OBSTETRICS & GYNECOLOGY

## 2025-01-22 PROCEDURE — S2900 ROBOTIC SURGICAL SYSTEM: HCPCS | Performed by: OBSTETRICS & GYNECOLOGY

## 2025-01-22 PROCEDURE — 7100000010 HC PHASE II RECOVERY - FIRST 15 MIN: Performed by: OBSTETRICS & GYNECOLOGY

## 2025-01-22 PROCEDURE — 2580000003 HC RX 258: Performed by: NURSE ANESTHETIST, CERTIFIED REGISTERED

## 2025-01-22 PROCEDURE — 7100000000 HC PACU RECOVERY - FIRST 15 MIN: Performed by: OBSTETRICS & GYNECOLOGY

## 2025-01-22 PROCEDURE — 6360000002 HC RX W HCPCS: Performed by: NURSE ANESTHETIST, CERTIFIED REGISTERED

## 2025-01-22 RX ORDER — ONDANSETRON 2 MG/ML
INJECTION INTRAMUSCULAR; INTRAVENOUS
Status: DISCONTINUED | OUTPATIENT
Start: 2025-01-22 | End: 2025-01-22 | Stop reason: SDUPTHER

## 2025-01-22 RX ORDER — DIPHENHYDRAMINE HYDROCHLORIDE 50 MG/ML
12.5 INJECTION INTRAMUSCULAR; INTRAVENOUS
Status: DISCONTINUED | OUTPATIENT
Start: 2025-01-22 | End: 2025-01-22 | Stop reason: HOSPADM

## 2025-01-22 RX ORDER — LIDOCAINE HYDROCHLORIDE 20 MG/ML
INJECTION, SOLUTION EPIDURAL; INFILTRATION; INTRACAUDAL; PERINEURAL
Status: DISCONTINUED | OUTPATIENT
Start: 2025-01-22 | End: 2025-01-22 | Stop reason: SDUPTHER

## 2025-01-22 RX ORDER — SODIUM CHLORIDE 0.9 % (FLUSH) 0.9 %
5-40 SYRINGE (ML) INJECTION PRN
Status: DISCONTINUED | OUTPATIENT
Start: 2025-01-22 | End: 2025-01-22 | Stop reason: HOSPADM

## 2025-01-22 RX ORDER — SODIUM CHLORIDE 0.9 % (FLUSH) 0.9 %
5-40 SYRINGE (ML) INJECTION EVERY 12 HOURS SCHEDULED
Status: DISCONTINUED | OUTPATIENT
Start: 2025-01-22 | End: 2025-01-22 | Stop reason: HOSPADM

## 2025-01-22 RX ORDER — ACETAMINOPHEN 500 MG
1000 TABLET ORAL ONCE
Status: COMPLETED | OUTPATIENT
Start: 2025-01-22 | End: 2025-01-22

## 2025-01-22 RX ORDER — SODIUM CHLORIDE 9 MG/ML
INJECTION, SOLUTION INTRAVENOUS PRN
Status: DISCONTINUED | OUTPATIENT
Start: 2025-01-22 | End: 2025-01-22 | Stop reason: HOSPADM

## 2025-01-22 RX ORDER — FENTANYL CITRATE 50 UG/ML
25 INJECTION, SOLUTION INTRAMUSCULAR; INTRAVENOUS EVERY 5 MIN PRN
Status: DISCONTINUED | OUTPATIENT
Start: 2025-01-22 | End: 2025-01-22 | Stop reason: HOSPADM

## 2025-01-22 RX ORDER — DEXAMETHASONE SODIUM PHOSPHATE 4 MG/ML
INJECTION, SOLUTION INTRA-ARTICULAR; INTRALESIONAL; INTRAMUSCULAR; INTRAVENOUS; SOFT TISSUE
Status: DISCONTINUED | OUTPATIENT
Start: 2025-01-22 | End: 2025-01-22 | Stop reason: SDUPTHER

## 2025-01-22 RX ORDER — MIDAZOLAM HYDROCHLORIDE 1 MG/ML
INJECTION, SOLUTION INTRAMUSCULAR; INTRAVENOUS
Status: DISCONTINUED | OUTPATIENT
Start: 2025-01-22 | End: 2025-01-22 | Stop reason: SDUPTHER

## 2025-01-22 RX ORDER — OXYCODONE HYDROCHLORIDE 5 MG/1
10 TABLET ORAL EVERY 4 HOURS PRN
Status: DISCONTINUED | OUTPATIENT
Start: 2025-01-22 | End: 2025-01-22 | Stop reason: HOSPADM

## 2025-01-22 RX ORDER — BUPIVACAINE HYDROCHLORIDE AND EPINEPHRINE 5; 5 MG/ML; UG/ML
INJECTION, SOLUTION EPIDURAL; INTRACAUDAL; PERINEURAL
Status: COMPLETED | OUTPATIENT
Start: 2025-01-22 | End: 2025-01-22

## 2025-01-22 RX ORDER — PROCHLORPERAZINE EDISYLATE 5 MG/ML
5 INJECTION INTRAMUSCULAR; INTRAVENOUS
Status: DISCONTINUED | OUTPATIENT
Start: 2025-01-22 | End: 2025-01-22 | Stop reason: HOSPADM

## 2025-01-22 RX ORDER — NALOXONE HYDROCHLORIDE 0.4 MG/ML
INJECTION, SOLUTION INTRAMUSCULAR; INTRAVENOUS; SUBCUTANEOUS PRN
Status: DISCONTINUED | OUTPATIENT
Start: 2025-01-22 | End: 2025-01-22 | Stop reason: HOSPADM

## 2025-01-22 RX ORDER — IBUPROFEN 800 MG/1
800 TABLET, FILM COATED ORAL EVERY 8 HOURS
Qty: 60 TABLET | Refills: 1 | Status: SHIPPED | OUTPATIENT
Start: 2025-01-22

## 2025-01-22 RX ORDER — ONDANSETRON 4 MG/1
4 TABLET, ORALLY DISINTEGRATING ORAL EVERY 8 HOURS PRN
Status: DISCONTINUED | OUTPATIENT
Start: 2025-01-22 | End: 2025-01-22 | Stop reason: HOSPADM

## 2025-01-22 RX ORDER — ONDANSETRON 2 MG/ML
4 INJECTION INTRAMUSCULAR; INTRAVENOUS
Status: COMPLETED | OUTPATIENT
Start: 2025-01-22 | End: 2025-01-22

## 2025-01-22 RX ORDER — SODIUM CHLORIDE, SODIUM LACTATE, POTASSIUM CHLORIDE, CALCIUM CHLORIDE 600; 310; 30; 20 MG/100ML; MG/100ML; MG/100ML; MG/100ML
INJECTION, SOLUTION INTRAVENOUS
Status: DISCONTINUED | OUTPATIENT
Start: 2025-01-22 | End: 2025-01-22 | Stop reason: SDUPTHER

## 2025-01-22 RX ORDER — ROCURONIUM BROMIDE 10 MG/ML
INJECTION, SOLUTION INTRAVENOUS
Status: DISCONTINUED | OUTPATIENT
Start: 2025-01-22 | End: 2025-01-22 | Stop reason: SDUPTHER

## 2025-01-22 RX ORDER — SODIUM CHLORIDE, SODIUM LACTATE, POTASSIUM CHLORIDE, CALCIUM CHLORIDE 600; 310; 30; 20 MG/100ML; MG/100ML; MG/100ML; MG/100ML
INJECTION, SOLUTION INTRAVENOUS CONTINUOUS
Status: DISCONTINUED | OUTPATIENT
Start: 2025-01-22 | End: 2025-01-22 | Stop reason: HOSPADM

## 2025-01-22 RX ORDER — FENTANYL CITRATE 50 UG/ML
INJECTION, SOLUTION INTRAMUSCULAR; INTRAVENOUS
Status: DISCONTINUED | OUTPATIENT
Start: 2025-01-22 | End: 2025-01-22 | Stop reason: SDUPTHER

## 2025-01-22 RX ORDER — CELECOXIB 200 MG/1
200 CAPSULE ORAL ONCE
Status: COMPLETED | OUTPATIENT
Start: 2025-01-22 | End: 2025-01-22

## 2025-01-22 RX ORDER — CEFAZOLIN SODIUM 1 G/3ML
INJECTION, POWDER, FOR SOLUTION INTRAMUSCULAR; INTRAVENOUS
Status: DISCONTINUED | OUTPATIENT
Start: 2025-01-22 | End: 2025-01-22 | Stop reason: SDUPTHER

## 2025-01-22 RX ORDER — SODIUM CHLORIDE 9 MG/ML
INJECTION, SOLUTION INTRAVENOUS CONTINUOUS
Status: DISCONTINUED | OUTPATIENT
Start: 2025-01-22 | End: 2025-01-22 | Stop reason: HOSPADM

## 2025-01-22 RX ORDER — OXYCODONE HYDROCHLORIDE 5 MG/1
5 TABLET ORAL
Status: DISCONTINUED | OUTPATIENT
Start: 2025-01-22 | End: 2025-01-22 | Stop reason: HOSPADM

## 2025-01-22 RX ORDER — HYDROCODONE BITARTRATE AND ACETAMINOPHEN 5; 325 MG/1; MG/1
1 TABLET ORAL EVERY 6 HOURS PRN
Qty: 20 TABLET | Refills: 0 | Status: SHIPPED | OUTPATIENT
Start: 2025-01-22 | End: 2025-01-27

## 2025-01-22 RX ORDER — METRONIDAZOLE 500 MG/100ML
500 INJECTION, SOLUTION INTRAVENOUS
Status: COMPLETED | OUTPATIENT
Start: 2025-01-22 | End: 2025-01-22

## 2025-01-22 RX ORDER — KETAMINE HCL 50MG/ML(1)
SYRINGE (ML) INTRAVENOUS
Status: DISCONTINUED | OUTPATIENT
Start: 2025-01-22 | End: 2025-01-22 | Stop reason: SDUPTHER

## 2025-01-22 RX ORDER — LABETALOL HYDROCHLORIDE 5 MG/ML
10 INJECTION, SOLUTION INTRAVENOUS
Status: DISCONTINUED | OUTPATIENT
Start: 2025-01-22 | End: 2025-01-22 | Stop reason: HOSPADM

## 2025-01-22 RX ORDER — HYDRALAZINE HYDROCHLORIDE 20 MG/ML
10 INJECTION INTRAMUSCULAR; INTRAVENOUS
Status: DISCONTINUED | OUTPATIENT
Start: 2025-01-22 | End: 2025-01-22 | Stop reason: HOSPADM

## 2025-01-22 RX ORDER — OXYCODONE HYDROCHLORIDE 5 MG/1
5 TABLET ORAL EVERY 4 HOURS PRN
Status: DISCONTINUED | OUTPATIENT
Start: 2025-01-22 | End: 2025-01-22 | Stop reason: HOSPADM

## 2025-01-22 RX ORDER — PROPOFOL 10 MG/ML
INJECTION, EMULSION INTRAVENOUS
Status: DISCONTINUED | OUTPATIENT
Start: 2025-01-22 | End: 2025-01-22 | Stop reason: SDUPTHER

## 2025-01-22 RX ADMIN — Medication 25 MG: at 10:19

## 2025-01-22 RX ADMIN — DEXAMETHASONE SODIUM PHOSPHATE 8 MG: 4 INJECTION, SOLUTION INTRAMUSCULAR; INTRAVENOUS at 09:40

## 2025-01-22 RX ADMIN — ONDANSETRON 4 MG: 2 INJECTION INTRAMUSCULAR; INTRAVENOUS at 10:48

## 2025-01-22 RX ADMIN — CEFAZOLIN 2 G: 1 INJECTION, POWDER, FOR SOLUTION INTRAMUSCULAR; INTRAVENOUS; PARENTERAL at 09:46

## 2025-01-22 RX ADMIN — FENTANYL CITRATE 50 MCG: 50 INJECTION, SOLUTION INTRAMUSCULAR; INTRAVENOUS at 09:35

## 2025-01-22 RX ADMIN — PHENYLEPHRINE HYDROCHLORIDE 100 MCG: 10 INJECTION INTRAVENOUS at 09:56

## 2025-01-22 RX ADMIN — METRONIDAZOLE 500 MG: 500 INJECTION, SOLUTION INTRAVENOUS at 09:46

## 2025-01-22 RX ADMIN — SODIUM CHLORIDE, POTASSIUM CHLORIDE, SODIUM LACTATE AND CALCIUM CHLORIDE: 600; 310; 30; 20 INJECTION, SOLUTION INTRAVENOUS at 09:30

## 2025-01-22 RX ADMIN — SUGAMMADEX 200 MG: 100 INJECTION, SOLUTION INTRAVENOUS at 10:53

## 2025-01-22 RX ADMIN — HYDROMORPHONE HYDROCHLORIDE 0.5 MG: 1 INJECTION, SOLUTION INTRAMUSCULAR; INTRAVENOUS; SUBCUTANEOUS at 10:48

## 2025-01-22 RX ADMIN — PROPOFOL 50 MG: 10 INJECTION, EMULSION INTRAVENOUS at 10:54

## 2025-01-22 RX ADMIN — MIDAZOLAM 2 MG: 1 INJECTION INTRAMUSCULAR; INTRAVENOUS at 09:30

## 2025-01-22 RX ADMIN — FENTANYL CITRATE 50 MCG: 50 INJECTION, SOLUTION INTRAMUSCULAR; INTRAVENOUS at 09:40

## 2025-01-22 RX ADMIN — ROCURONIUM BROMIDE 20 MG: 10 INJECTION INTRAVENOUS at 09:57

## 2025-01-22 RX ADMIN — CELECOXIB 200 MG: 200 CAPSULE ORAL at 06:56

## 2025-01-22 RX ADMIN — Medication 25 MG: at 09:50

## 2025-01-22 RX ADMIN — PROPOFOL 200 MG: 10 INJECTION, EMULSION INTRAVENOUS at 09:35

## 2025-01-22 RX ADMIN — ONDANSETRON 4 MG: 2 INJECTION INTRAMUSCULAR; INTRAVENOUS at 12:25

## 2025-01-22 RX ADMIN — PHENYLEPHRINE HYDROCHLORIDE 100 MCG: 10 INJECTION INTRAVENOUS at 10:30

## 2025-01-22 RX ADMIN — ROCURONIUM BROMIDE 50 MG: 10 INJECTION INTRAVENOUS at 09:36

## 2025-01-22 RX ADMIN — ROCURONIUM BROMIDE 20 MG: 10 INJECTION INTRAVENOUS at 10:30

## 2025-01-22 RX ADMIN — ACETAMINOPHEN 1000 MG: 500 TABLET ORAL at 06:56

## 2025-01-22 RX ADMIN — LIDOCAINE HYDROCHLORIDE 100 MG: 20 INJECTION, SOLUTION EPIDURAL; INFILTRATION; INTRACAUDAL; PERINEURAL at 09:35

## 2025-01-22 ASSESSMENT — PAIN DESCRIPTION - DESCRIPTORS: DESCRIPTORS: ACHING;PRESSURE

## 2025-01-22 ASSESSMENT — PAIN - FUNCTIONAL ASSESSMENT
PAIN_FUNCTIONAL_ASSESSMENT: 0-10
PAIN_FUNCTIONAL_ASSESSMENT: 0-10
PAIN_FUNCTIONAL_ASSESSMENT: ACTIVITIES ARE NOT PREVENTED

## 2025-01-22 ASSESSMENT — PAIN SCALES - GENERAL
PAINLEVEL_OUTOF10: 0

## 2025-01-22 ASSESSMENT — PAIN SCALES - WONG BAKER
WONGBAKER_NUMERICALRESPONSE: NO HURT
WONGBAKER_NUMERICALRESPONSE: NO HURT

## 2025-01-22 NOTE — PROGRESS NOTES
1235: Walked pt to bathroom, pt was able to urinate   1240: Walked pt to room, pt wanted to lay back down for now  1310: Pt was willing to get up and get dressed. Left wrist IV removed

## 2025-01-22 NOTE — H&P
Marilou Carrera  1985  Primary Care Physician: Linh Weinberg PA    HISTORY & PHYSICAL        HOSPITAL: [facility]    HPI: Marilou Carrera is a 39 y.o. female  is persistent menometrorrhagia and iron deficiency anemia despite conservative surgical and medical management.    No diagnosis found.   Patient Active Problem List   Diagnosis    Acute appendicitis    Dehiscence of surgical wound    Chronic hypertension complicating or reason for care during pregnancy, second trimester     death in prior pregnancy, currently pregnant, second trimester    Labor and delivery indication for care or intervention    Obesity affecting pregnancy in third trimester, antepartum    History of stillbirth in currently pregnant patient, third trimester    GDM, class A1    Gestational diabetes mellitus (GDM), delivered    Elderly multigravida delivered    Single liveborn, born in hospital, delivered by vaginal delivery    39 weeks gestation of pregnancy    Encounter for elective induction of labor    Preeclampsia in postpartum period    Pre-eclampsia in third trimester    Acute pyelonephritis    Nephrolithiasis    Hyponatremia    SIRS (systemic inflammatory response syndrome) (HCC)    Tachycardia    Left ovarian cyst    Menometrorrhagia    Microcytic anemia    Iron deficiency anemia due to chronic blood loss       OB History    Para Term  AB Living   6 5 4 1 1 4   SAB IAB Ectopic Molar Multiple Live Births   1 0 0 0 0 5      # Outcome Date GA Lbr Ari/2nd Weight Sex Type Anes PTL Lv   6 Term 22 39w2d 01:33 / 00:09 4.062 kg (8 lb 15.3 oz) M Vag-Spont EPI N JAKOB      Name: WIN CROOKS      Apgar1: 8  Apgar5: 9   5 SAB 21     OTHER      4 Term 18 39w1d  4.245 kg (9 lb 5.7 oz) M  EPI  JAKOB      Name: ANGELITO CARRERABABY BOY      Apgar1: 6  Apgar5: 9   3 Term 14 39w0d  4.054 kg (8 lb 15 oz) F

## 2025-01-22 NOTE — DISCHARGE INSTRUCTIONS
[facility]  [phone]    Do not drive, work around machines or use equipment.  Do not drink any alcoholic beverages.  Do not smoke while alone.  Avoid making important decisions.  Plan to spend a quiet, relaxed evening @ home.  Resume normal activities as you begin to feel better.  Eat lightly for your first meal, then gradually increase your diet to what is normal for you.  In case of nausea, avoid food and drink only clear liquids.  Resume food as nausea ceases.  Notify your surgeon if you experience fever, chills, large amount of bleeding, difficulty breathing, persistent nausea and vomiting or any other disturbing problem.  Call for a follow-up appointment with your surgeon.       Blake De Luna MD/Aneudy Harrell MD   [address] [place], [place] [address]   Phone [phone]   Fax [phone]     Hysterectomy, , and Exploratory Laparotomy    POST-OPERATIVE INSTRUCTIONS        DANGER SIGNS: Call OB/GYN if they occur:     A fever of more than 100.4   Report excessive bleeding (saturating a pad every ½ to 1 hour).   Severe pain, unrelieved by normal medication.   Shortness of breath, difficulty breathing or chest pain.   Painful or burning urination.   Severe back pain.   Painful swelling or redness in legs.   Drainage or pus from the wound.   Foul smelling vaginal discharge.     Eating and drinking:     You may continue your regular diet, however, we recommend that you:    Eat multiple small meals.   Eat foods high in protein such as meat, fish, eggs, and dairy products.   Avoid hot, spicy, and fatty foods.   Eat fruits with vitamin C (i.e. citrus fruits), vegetables, and high fiber foods.   Drink at least 3 quarts of liquid a day (try to drink more earlier in the day).     Constipation:     Narcotic pain medications such as Percocet, Vicodin, and codeine can cause constipation.   Try the following to avoid constipation:   Eat high fiber foods such as prunes or

## 2025-01-22 NOTE — PROGRESS NOTES
1437 iv removed, d/c instructions given to pt nd mother Aneta.  Mother is picking up meds in OP pharmacy.  1338 pt d/c to home via w/c per vips.  Pt stable at d/c.

## 2025-01-22 NOTE — OP NOTE
Operative Report    Patient: Marilou Carrera MRN: [id number]     YOB: 1985  Age: 39 y.o.  Sex: female           OPERATION: Robotic Hysterectomy with Bilateral Salpingectomy, cystoscopy    DATE OF OPERATION: 1/22/2025    SURGEON: Blake De Luna MD    ASSISTANT: RADHA Begum CFA    PREOPERATIVE DIAGNOSIS: Menometrorrhagia [N92.1]  Iron deficiency anemia due to chronic blood loss [D50.0]    POSTOPERATIVE DIAGNOSIS: Same    Specimens:    ID Type Source Tests Collected by Time Destination   A : UTERUS AND CERVIX Tissue Tissue SURGICAL PATHOLOGY Blake De Luna MD 1/22/2025 1034        INDICATIONS:    ANESTHESIA: General.    Complications: Persistent menometrorrhagia despite a trial of conservative medical and surgical management.    EBL: 10 cc    FINDINGS: Absent fallopian tubes bilaterally.  Absent right ovary.  A 3.5 cm left ovarian cyst.  Normal uterus.  No evidence of endometriosis.  Cystoscopy reveals an intact urethra, bilateral patent ureters, intact bladder mucosa.      DESCRIPTION OF OPERATION:    After the patient was identified in the operating room, she was   placed under general anesthesia by the anesthesia team. She was sterilely   prepped and draped in the modified lithotomy position.  A Cota catheter was placed.  The cervix and   uterus were secured with Shilpi uterine manipulator/Cheryl colpotomizer.  Gloves were changed and attention was then turned to the abdomen.  All incisions were infiltrated with 0.5% Marcaine with epinephrine prior to the incisions being made.  An incision was made in the midline of   the upper abdomen and an 8mm Optiview trocar was inserted. A   pneumoperitoneum was created.  2 additional 8 mm robotic ports were placed under   direct vision in the right and left upper abdomen, and an accessory port   was placed in the right upper quadrant. The patient was placed in Trendelenburg position and the robot was docked. The ureters were identified.  A linear

## 2025-01-22 NOTE — PROGRESS NOTES
1108: Patient arrived in PACU from the OR s/p vaginal hysterectomy robotic assisted. Received report from Lanny SCHROEDER and Finn CURRAN. Patient attached to monitor and all alarms are on. Patient has x4 surgical sites on the abdomen well approximated with surgical glue, no drainage noted. Doris pad dry.   1124: Patient awake, sleepy from anesthesia, following commands, VSS. Breathing normal and unlabored, chest expansion symmetrical and trachea is midline.   1125:Patient denies pain or nausea at this time.  1132:Dozing. 100% on 2L per nasal cannula. Doris pad clean and dry, incisions well approximated no drainage noted at this time.   1148: Patient awake and following commands, sleepy from anesthesia, VSS, 100% on room air. Patient denies any pain or nausea at this time. Doris pad dry, incisions on abdomen well approximated, no drainage noted.   1158: RN transported patient to room 14 on SDS, bedside handoff report given to Micah SCHROEDER. VSS. RN assisted patient to restroom.

## 2025-01-23 LAB — SURGICAL PATHOLOGY REPORT: NORMAL

## 2025-01-23 NOTE — ANESTHESIA POSTPROCEDURE EVALUATION
Department of Anesthesiology  Postprocedure Note    Patient: Marilou Carrera  MRN: [id number]  YOB: 1985  Date of evaluation: 1/22/2025    Procedure Summary       Date: 01/22/25 Room / Location: [address]    Anesthesia Start: 0931 Anesthesia Stop: 1112    Procedures:       HYSTERECTOMY VAGINAL ROBOTIC ASSISTED      CYSTOSCOPY Diagnosis:       Menometrorrhagia      Iron deficiency anemia due to chronic blood loss      (Menometrorrhagia [N92.1])      (Iron deficiency anemia due to chronic blood loss [D50.0])    Surgeons: Blake De Luna MD Responsible Provider: Nate Null MD    Anesthesia Type: general ASA Status: 2            Anesthesia Type: No value filed.    Veronica Phase I: Veronica Score: 10    Veronica Phase II: Veronica Score: 10    Anesthesia Post Evaluation    Patient location during evaluation: PACU  Patient participation: complete - patient participated  Level of consciousness: awake and alert  Pain score: 2  Airway patency: patent  Nausea & Vomiting: no nausea and no vomiting  Cardiovascular status: hemodynamically stable  Respiratory status: nasal cannula  Hydration status: euvolemic  Pain management: adequate    No notable events documented.

## 2025-01-30 ENCOUNTER — OFFICE VISIT (OUTPATIENT)
Dept: OBGYN | Age: 40
End: 2025-01-30

## 2025-01-30 VITALS
HEIGHT: 69 IN | BODY MASS INDEX: 31.4 KG/M2 | DIASTOLIC BLOOD PRESSURE: 104 MMHG | WEIGHT: 212 LBS | HEART RATE: 77 BPM | SYSTOLIC BLOOD PRESSURE: 155 MMHG

## 2025-01-30 DIAGNOSIS — Z09 POSTOPERATIVE EXAMINATION: Primary | ICD-10-CM

## 2025-01-30 PROCEDURE — 99024 POSTOP FOLLOW-UP VISIT: CPT | Performed by: OBSTETRICS & GYNECOLOGY

## 2025-01-30 SDOH — ECONOMIC STABILITY: FOOD INSECURITY: WITHIN THE PAST 12 MONTHS, THE FOOD YOU BOUGHT JUST DIDN'T LAST AND YOU DIDN'T HAVE MONEY TO GET MORE.: NEVER TRUE

## 2025-01-30 SDOH — ECONOMIC STABILITY: FOOD INSECURITY: WITHIN THE PAST 12 MONTHS, YOU WORRIED THAT YOUR FOOD WOULD RUN OUT BEFORE YOU GOT MONEY TO BUY MORE.: NEVER TRUE

## 2025-01-30 ASSESSMENT — PATIENT HEALTH QUESTIONNAIRE - PHQ9
SUM OF ALL RESPONSES TO PHQ QUESTIONS 1-9: 0
2. FEELING DOWN, DEPRESSED OR HOPELESS: NOT AT ALL
1. LITTLE INTEREST OR PLEASURE IN DOING THINGS: NOT AT ALL
SUM OF ALL RESPONSES TO PHQ QUESTIONS 1-9: 0
SUM OF ALL RESPONSES TO PHQ9 QUESTIONS 1 & 2: 0

## 2025-01-30 NOTE — PROGRESS NOTES
1/30/25    Marilou Carrera  1985    Chief Complaint   Patient presents with    Post-Op Check      Pt here for post op.  Robotic Hysterectomy with Bilateral Salpingectomy, cystoscopy on 1/22/2025. C/o right sided pain that started last night. States has been trying to take it easy.         Marilou Carrera is a 39 y.o. female who presents today for evaluation of as above.  Pain is very minimal    Past Medical History:   Diagnosis Date    Acute appendicitis 04/11/2012    Chemical pregnancy     Hypertension     Irregular menses     Kidney infection     Kidney stones 07/08/2022    Obesity     Prior pregnancy with fetal demise and current pregnancy 02/2007    36Wks       Past Surgical History:   Procedure Laterality Date    BLADDER SURGERY Right 07/08/2022    CYSTOSCOPY RIGHT  RETROGRADE PYELOGRAM STENT INSERTION performed by Shara Mcghee MD at [facility] OR    CHOLECYSTECTOMY      CYSTOSCOPY N/A 1/22/2025    CYSTOSCOPY performed by Blake De Luna MD at [facility] OR    DILATION AND CURETTAGE OF UTERUS N/A 11/6/2024    DILATATION AND CURETTAGE HYSTEROSCOPY NOVASURE ABLATION performed by Blake De Luna MD at [facility] OR    HYSTERECTOMY (CERVIX STATUS UNKNOWN) N/A 1/22/2025    HYSTERECTOMY VAGINAL ROBOTIC ASSISTED performed by Blake De Luna MD at [facility] OR    LAPAROSCOPIC APPENDECTOMY  04/10/2012    LAPAROSCOPY N/A 11/6/2024    LAPAROSCOPY EXPLORATORY WITH RIGHT OOPHORECTOMY AND LEFT OVARIAN CYSTECTOMY performed by Blake De Luna MD at [facility] OR    LITHOTRIPSY Right 08/08/2022    RIGHT CYSTOSCOPY URETEROSCOPY RETROGRADE PYELOGRAM STONE MANIPULATION WITH HOLIUM LASER LITHOTRIPSY POSSIBLE STENT PLACEMENT performed by Bean Crooks MD at [facility] OR    OTHER SURGICAL HISTORY  07/2010    lump removed from right axilla    OTHER SURGICAL HISTORY  09/2010    lump removed from groin area    OTHER SURGICAL HISTORY Right 04/11/2013    Excision of lesion to R axilla    OTHER SURGICAL

## 2025-03-04 ENCOUNTER — OFFICE VISIT (OUTPATIENT)
Dept: OBGYN | Age: 40
End: 2025-03-04

## 2025-03-04 VITALS
HEART RATE: 91 BPM | WEIGHT: 217 LBS | DIASTOLIC BLOOD PRESSURE: 97 MMHG | SYSTOLIC BLOOD PRESSURE: 142 MMHG | HEIGHT: 69 IN | BODY MASS INDEX: 32.14 KG/M2

## 2025-03-04 DIAGNOSIS — Z09 POSTOPERATIVE EXAMINATION: Primary | ICD-10-CM

## 2025-03-04 PROCEDURE — 99024 POSTOP FOLLOW-UP VISIT: CPT | Performed by: OBSTETRICS & GYNECOLOGY

## 2025-03-04 NOTE — PROGRESS NOTES
is soft. There is no mass.      Tenderness: There is no abdominal tenderness.      Hernia: No hernia is present. There is no hernia in the left inguinal area or right inguinal area.   Genitourinary:     Exam position: Lithotomy position.      Pubic Area: No rash.       Labia:         Right: No rash, tenderness or lesion.         Left: No rash, tenderness or lesion.       Urethra: No prolapse, urethral pain, urethral swelling or urethral lesion.      Vagina: Normal. No vaginal discharge, erythema, tenderness, bleeding or lesions.      Uterus: Absent.       Adnexa: Right adnexa normal and left adnexa normal.        Right: No mass, tenderness or fullness.          Left: No mass, tenderness or fullness.        Rectum: No mass or anal fissure. Normal anal tone.      Comments: Cuff healed and well supported  Musculoskeletal:      Cervical back: Normal range of motion and neck supple.   Lymphadenopathy:      Lower Body: No right inguinal adenopathy. No left inguinal adenopathy.   Skin:     General: Skin is warm and dry.   Neurological:      General: No focal deficit present.      Mental Status: She is alert and oriented to person, place, and time.   Psychiatric:         Mood and Affect: Mood normal.         Behavior: Behavior normal.         Thought Content: Thought content normal.         Judgment: Judgment normal.         No results found for this visit on 03/04/25.    ASSESSMENT AND PLAN   Diagnosis Orders   1. Postoperative examination            Return as needed or annual.    Blake De Luna MD

## 2025-04-07 NOTE — ED NOTES
Called access center to have [facility] Transfer started      Amarilis Uribe  06/22/22 1924 How Severe Are Your Spot(S)?: mild Have Your Spot(S) Been Treated In The Past?: has not been treated Hpi Title: Evaluation of Skin Lesions

## 2025-08-19 ENCOUNTER — HOSPITAL ENCOUNTER (OUTPATIENT)
Dept: PHYSICAL THERAPY | Age: 40
Setting detail: THERAPIES SERIES
Discharge: HOME | End: 2025-08-19
Payer: COMMERCIAL

## 2025-08-19 PROCEDURE — 97110 THERAPEUTIC EXERCISES: CPT

## 2025-08-19 PROCEDURE — 97162 PT EVAL MOD COMPLEX 30 MIN: CPT

## 2025-08-21 ENCOUNTER — HOSPITAL ENCOUNTER (OUTPATIENT)
Dept: PHYSICAL THERAPY | Age: 40
Discharge: HOME | End: 2025-08-21

## 2025-08-26 ENCOUNTER — HOSPITAL ENCOUNTER (OUTPATIENT)
Dept: PHYSICAL THERAPY | Age: 40
Setting detail: THERAPIES SERIES
Discharge: HOME | End: 2025-08-26
Payer: COMMERCIAL

## 2025-08-26 PROCEDURE — 97110 THERAPEUTIC EXERCISES: CPT

## 2025-08-26 PROCEDURE — 97112 NEUROMUSCULAR REEDUCATION: CPT

## 2025-08-28 ENCOUNTER — HOSPITAL ENCOUNTER (OUTPATIENT)
Dept: PHYSICAL THERAPY | Age: 40
Setting detail: THERAPIES SERIES
Discharge: HOME | End: 2025-08-28
Payer: COMMERCIAL

## 2025-08-28 PROCEDURE — 97110 THERAPEUTIC EXERCISES: CPT

## 2025-08-28 PROCEDURE — 97112 NEUROMUSCULAR REEDUCATION: CPT

## 2025-09-02 ENCOUNTER — HOSPITAL ENCOUNTER (OUTPATIENT)
Dept: PHYSICAL THERAPY | Age: 40
Setting detail: THERAPIES SERIES
Discharge: HOME | End: 2025-09-02
Payer: COMMERCIAL

## 2025-09-02 PROCEDURE — 97140 MANUAL THERAPY 1/> REGIONS: CPT

## 2025-09-02 PROCEDURE — 97112 NEUROMUSCULAR REEDUCATION: CPT

## 2025-09-02 PROCEDURE — 97110 THERAPEUTIC EXERCISES: CPT

## 2025-09-04 ENCOUNTER — HOSPITAL ENCOUNTER (OUTPATIENT)
Dept: PHYSICAL THERAPY | Age: 40
Setting detail: THERAPIES SERIES
Discharge: HOME | End: 2025-09-04
Payer: COMMERCIAL

## 2025-09-04 PROCEDURE — 97110 THERAPEUTIC EXERCISES: CPT

## 2025-09-04 PROCEDURE — 97140 MANUAL THERAPY 1/> REGIONS: CPT

## (undated) DEVICE — TRAY PREP DRY W/ PREM GLV 2 APPL 6 SPNG 2 UNDPD 1 OVERWRAP

## (undated) DEVICE — PACK,CYSTOSCOPY,PK I,AURORA: Brand: MEDLINE

## (undated) DEVICE — PACK,BASIC,IX: Brand: MEDLINE

## (undated) DEVICE — TOWEL,OR,DSP,ST,BLUE,STD,6/PK,12PK/CS: Brand: MEDLINE

## (undated) DEVICE — COLUMN DRAPE

## (undated) DEVICE — Z INACTIVE USE 2863041 SPONGE GZ W4XL4IN 100% COT 16 PLY RADPQ HIGHLY ABSRB

## (undated) DEVICE — SUTURE MONOCRYL SZ 4-0 L18IN ABSRB UD L19MM PS-2 3/8 CIR PRIM Y496G

## (undated) DEVICE — MATERNITY PAD,HEAVY: Brand: CURITY

## (undated) DEVICE — MARKER SURG SKIN UTIL REGULAR/FINE 2 TIP W/ RUL AND 9 LBL

## (undated) DEVICE — Z DISCONTINUED USE 2764362 SEAL ENDOSCP INSTR DIA5-8MM UNIV FOR CANN DA VINCI XI

## (undated) DEVICE — MONOPOLAR CURVED SCISSORS: Brand: ENDOWRIST

## (undated) DEVICE — 40586 ADVANCED TRENDELENBURG POSITIONING KIT: Brand: 40586 ADVANCED TRENDELENBURG POSITIONING KIT

## (undated) DEVICE — Z INACTIVE COVER MAYO STAND CLTH

## (undated) DEVICE — 2, DISPOSABLE SUCTION/IRRIGATOR WITH DISPOSABLE TIP: Brand: STRYKEFLOW

## (undated) DEVICE — CONTAINER,SPECIMEN,OR STERILE,4OZ: Brand: MEDLINE

## (undated) DEVICE — PREMIUM DRY TRAY LF: Brand: MEDLINE INDUSTRIES, INC.

## (undated) DEVICE — Z INACTIVE USE 2660663 SOLUTION IRRIG 1000ML STRIL H2O USP PLAS POUR BTL

## (undated) DEVICE — NEEDLE HYPO 23GA L1.5IN TURQ POLYPR HUB S STL THN WALL IM

## (undated) DEVICE — TOTAL TRAY, DB, 100% SILI FOLEY, 16FR 10: Brand: MEDLINE

## (undated) DEVICE — STERILE LATEX POWDER-FREE SURGICAL GLOVESWITH NITRILE COATING: Brand: PROTEXIS

## (undated) DEVICE — SYRINGE MED 10ML LUERLOCK TIP W/O SFTY DISP

## (undated) DEVICE — AIRSEAL SINGLE LUMEN FILTERED TUBE SET: Brand: AIRSEAL

## (undated) DEVICE — GUIDEWIRE ENDOSCP L150CM DIA0.035IN TIP 3CM PTFE NIT

## (undated) DEVICE — MANIPULATOR 6.7MM RUMI UTERINE 6CM STER

## (undated) DEVICE — OPEN-END FLEXI-TIP URETERAL CATHETER: Brand: FLEXI-TIP

## (undated) DEVICE — COUNTER NDL 30 COUNT FOAM STRP SGL MAG

## (undated) DEVICE — TROCAR: Brand: KII FIOS FIRST ENTRY

## (undated) DEVICE — CLICKLINE OUTER SHEATH/SCISSORS INSERT: Brand: CLICKLINE

## (undated) DEVICE — GLOVE ORANGE PI 8   MSG9080

## (undated) DEVICE — SYRINGE MED 50ML LUERLOCK TIP

## (undated) DEVICE — CLEANSER SKIN 32OZ 4% CHG ANTIMIC ANTISEP SGL WRP HIBICLN

## (undated) DEVICE — ADHESIVE SKIN CLOSURE 0.7CC TOP MICROBIAL APPL DERMBND ADV

## (undated) DEVICE — LINER,SEMI-RIGID,3000CC,50EA/CS: Brand: MEDLINE

## (undated) DEVICE — CATHETER URET 5FR L70CM TIP 8FR OPN END CONE TIP INJ HUB

## (undated) DEVICE — APPLICATOR MEDICATED 26 CC SOLUTION HI LT ORNG CHLORAPREP

## (undated) DEVICE — AIRSEAL 8 MM ACCESS PORT AND LOW PROFILE OBTURATOR WITH BLADELESS OPTICAL TIP, 120 MM LENGTH: Brand: AIRSEAL

## (undated) DEVICE — KIT ANTIFOG SOL 6GM IPA DISP FOR ENDOSCP PROC FRED DEXIDE

## (undated) DEVICE — SYRINGE, LUER LOCK, 10ML: Brand: MEDLINE

## (undated) DEVICE — BASIC PACK: Brand: CONVERTORS

## (undated) DEVICE — URETERAL ACCESS SHEATH SET: Brand: NAVIGATOR

## (undated) DEVICE — PROTECTOR EYE PT SELF ADH NS OPT GRD LF

## (undated) DEVICE — UROLOGIC DRAIN BAG: Brand: UNBRANDED

## (undated) DEVICE — SEALER ENDOSCP L37CM NANO COAT BLNT TIP LAP DIV

## (undated) DEVICE — MANIPULATOR UTER ZINNATI 4.5 MMX13 IN CRV INJ STRL ZUMI LF

## (undated) DEVICE — SOLUTION IV 1000ML 0.9% SOD CHL FOR IRRIG PLAS CONT

## (undated) DEVICE — TIP IU L3.75CM DIA5.1MM YEL SFT FLX DST END DISP RUMI II

## (undated) DEVICE — PREMIUM WET SKIN PREP TRAY: Brand: MEDLINE INDUSTRIES, INC.

## (undated) DEVICE — GOWN,SIRUS,POLYRNF,BRTHSLV,XLN/XL,20/CS: Brand: MEDLINE

## (undated) DEVICE — SYSTEM ES CUP DIA3.5CM PNEUMO OCCL BLLN DISP FOR CLIN POS

## (undated) DEVICE — DRAPE, LAVH, STERILE: Brand: MEDLINE

## (undated) DEVICE — SET IRRIG L94IN ID0.281IN W/ 4.5IN DST FLX CONN 2 LD ON OFF

## (undated) DEVICE — TIP IU L6CM DIA5.1MM LAV SIL SFT FLX DST END DISP RUMI II

## (undated) DEVICE — SHEET,DRAPE,UNDERBUTTOCK,GRAD POUCH,PORT: Brand: MEDLINE

## (undated) DEVICE — ARM DRAPE

## (undated) DEVICE — TROCAR: Brand: KII® SLEEVE

## (undated) DEVICE — Z DUP USE 2522782 SOLUTION IRRIG 1000ML STRL H2O PLAS CONTAINER UROMATIC

## (undated) DEVICE — CATHETER,URETHRAL,VINYL,MALE,16",16 FR: Brand: MEDLINE

## (undated) DEVICE — SYRINGE 20ML LL S/C 50

## (undated) DEVICE — NITINOL STONE RETRIEVAL BASKET: Brand: ZERO TIP

## (undated) DEVICE — GLOVE SURG SZ 6 CRM LTX FREE POLYISOPRENE POLYMER BEAD ANTI

## (undated) DEVICE — GLOVE ORANGE PI 7   MSG9070

## (undated) DEVICE — MANIPULATOR UTER 6.7 MMX8 CM TIP BLU HUMI RUMI II

## (undated) DEVICE — AGENT HEMSTAT W2XL4IN OXIDIZED REGENERATED CELOS ABSRB

## (undated) DEVICE — TUBING, SUCTION, 9/32" X 10', STRAIGHT: Brand: MEDLINE

## (undated) DEVICE — STRATAFIX SPRL PDS + 2-0 23CM CT-2

## (undated) DEVICE — GOWN,ECLIPSE,POLYRNF,BRTHSLV,L,30/CS: Brand: MEDLINE

## (undated) DEVICE — GUIDEWIRE UROLOGICAL STR 3 CM 0.038 INX150 CM NIT SENSOR

## (undated) DEVICE — SYRINGE MED 20ML STD CLR PLAS LUERLOCK TIP N CTRL DISP

## (undated) DEVICE — INTENDED FOR TISSUE SEPARATION, AND OTHER PROCEDURES THAT REQUIRE A SHARP SURGICAL BLADE TO PUNCTURE OR CUT.: Brand: BARD-PARKER ® STAINLESS STEEL BLADES

## (undated) DEVICE — TIP IU L6CM DIA6.7MM WHT SIL FLX DISP RUMI II

## (undated) DEVICE — LEGGINGS, PAIR, CLEAR, STERILE: Brand: MEDLINE

## (undated) DEVICE — TIP IU L10CM DIA6.7MM GRN SIL FLX DISP RUMI II

## (undated) DEVICE — SINGLE-USE DIGITAL FLEXIBLE URETEROSCOPE: Brand: LITHOVUE

## (undated) DEVICE — Device

## (undated) DEVICE — VESSEL SEALER EXTEND: Brand: ENDOWRIST

## (undated) DEVICE — OBTURATOR ROBOTIC DIA8MM BLDELSS ENDOSCP DISP DA VINCI SI

## (undated) DEVICE — GLOVE ORANGE PI 7 1/2   MSG9075

## (undated) DEVICE — TUBING INSUFFLATOR HEAT HI FLO SET PNEUMOCLEAR

## (undated) DEVICE — GOWN,ISOLATION,MICROPOROUS,LV3,WHT,XL: Brand: MEDLINE

## (undated) DEVICE — AIRSEAL 8 MM CANNULA CAP AND OBTURATOR WITH BLADELESS OPTICAL TIP COMPATIBLE WITH INTUITIVE DA VINCI XI AND DA VINCI X 8 MM INSTRUMENT CANNULA, STANDARD LENGTH: Brand: AIRSEAL

## (undated) DEVICE — Z INACTIVE NO ACTIVE SUPPLIER APPLICATOR MEDICATED 26 CC TINT HI-LITE ORNG STRL CHLORAPREP